# Patient Record
Sex: FEMALE | Race: WHITE | HISPANIC OR LATINO | Employment: OTHER | URBAN - METROPOLITAN AREA
[De-identification: names, ages, dates, MRNs, and addresses within clinical notes are randomized per-mention and may not be internally consistent; named-entity substitution may affect disease eponyms.]

---

## 2017-02-02 ENCOUNTER — TELEPHONE (OUTPATIENT)
Dept: FAMILY MEDICINE | Facility: CLINIC | Age: 72
End: 2017-02-02

## 2017-02-02 DIAGNOSIS — G47.00 INSOMNIA, UNSPECIFIED TYPE: ICD-10-CM

## 2017-02-02 DIAGNOSIS — I10 ESSENTIAL HYPERTENSION WITH GOAL BLOOD PRESSURE LESS THAN 140/90: Primary | ICD-10-CM

## 2017-02-02 RX ORDER — ZOLPIDEM TARTRATE 10 MG/1
5 TABLET ORAL
Qty: 10 TABLET | Refills: 0 | Status: SHIPPED | OUTPATIENT
Start: 2017-02-02 | End: 2017-03-10

## 2017-02-02 RX ORDER — METOPROLOL TARTRATE 100 MG
100 TABLET ORAL 2 TIMES DAILY
Qty: 180 TABLET | Refills: 2 | Status: SHIPPED | OUTPATIENT
Start: 2017-02-02 | End: 2017-11-13

## 2017-02-02 NOTE — TELEPHONE ENCOUNTER
Called [facility] and they do not have script on file for metoprolol, so that was resent to pharmacy.    In regards to the ambien,   Got a message today stating:  Liz Becerril at 2/2/2017 10:42 AM      Status: Signed         Expand All Collapse All    Pt would like to change 20 tablets to 40 of the Ambien    Pt can be reached @ [phone]           Last filled 11/2/16 #20 with no refills. Last seen in [facility] 11/11/16. Pt. Is requesting #40. Pended script for what she previously was dispensed.  Reviewed  and medication was last dispensed on 11/2/16.  Routing to provider pool, Dr. Mcclelland is out of the office until 2/13/17.    Genet Guzman RN  [facility] [place]

## 2017-02-02 NOTE — TELEPHONE ENCOUNTER
Metoprolol Tartr 100MG Tab      Last Written Prescription Date: 11/2/16  Last Fill Quantity: 180, # refills: 3    Last Office Visit with [facility], [facility] or [employer] prescribing provider:  11/11/16   Future Office Visit:        BP Readings from Last 3 Encounters:   11/11/16 140/78   07/08/16 138/80   06/20/16 138/80       Zolpdem Tartrate 10 Mg Tablet      Last Written Prescription Date:  11/2/16  Last Fill Quantity: 20,   # refills: 0  Last Office Visit with [facility] or [facility] [employer] [facility] prescribing provider: 11/11/16  Future Office visit:       Routing refill request to provider for review/approval because:  Drug not on the [facility], [facility] or [employer] [facility] refill protocol or controlled substance

## 2017-02-02 NOTE — TELEPHONE ENCOUNTER
Please see refill encounter dated 2/2/17. Closing encounter. No further action needed.    Genet Guzman RN  [facility]

## 2017-03-09 DIAGNOSIS — G47.00 INSOMNIA, UNSPECIFIED TYPE: ICD-10-CM

## 2017-03-09 NOTE — TELEPHONE ENCOUNTER
Controlled Substance Refill Request for zolpidem (AMBIEN) 10 MG tablet  Problem List Complete:     checked in past 6 months?    LAST REFILL:  2/2/17  #10/0  LOV:  11/11/16   Patient was only given #10 the last time she got this and Dr. Mcclelland had talked about at one time ok to give her up to #40 and she would want this if possible.

## 2017-03-10 RX ORDER — ZOLPIDEM TARTRATE 10 MG/1
5 TABLET ORAL
Qty: 20 TABLET | Refills: 1 | Status: SHIPPED | OUTPATIENT
Start: 2017-03-10 | End: 2017-08-08

## 2017-03-10 NOTE — TELEPHONE ENCOUNTER
Routing to provider pool while Dr. Mcclelland is out -- pt is requesting 40 (?) Ambien. Please review and sign/advise.  checked -- last filled 2/2 for 20-day supply.    Thank you  Silke Cesar, AMADON, RN  [facility] [place] [place]

## 2017-08-08 DIAGNOSIS — M25.562 CHRONIC PAIN OF LEFT KNEE: ICD-10-CM

## 2017-08-08 DIAGNOSIS — G89.29 CHRONIC PAIN OF LEFT KNEE: ICD-10-CM

## 2017-08-08 DIAGNOSIS — G47.00 INSOMNIA, UNSPECIFIED TYPE: ICD-10-CM

## 2017-08-08 NOTE — TELEPHONE ENCOUNTER
Zolpidem    Last Written Prescription Date: 3/10/17  Last Fill Quantity: 20,  # refills: 1   Last Office Visit with [facility] or [facility] prescribing provider: 11/11/16    Reviewed  and no concerns.  Dispensed on 3/10/17 #20, and 5/15/17 #20    Genet Guzman RN  [facility] [place]

## 2017-08-09 RX ORDER — IBUPROFEN 800 MG/1
TABLET, FILM COATED ORAL
Qty: 30 TABLET | Refills: 0 | Status: SHIPPED | OUTPATIENT
Start: 2017-08-09 | End: 2017-09-08 | Stop reason: DRUGHIGH

## 2017-08-09 RX ORDER — ZOLPIDEM TARTRATE 10 MG/1
TABLET ORAL
Qty: 20 TABLET | Refills: 1 | Status: SHIPPED | OUTPATIENT
Start: 2017-08-09 | End: 2017-11-13

## 2017-08-09 NOTE — TELEPHONE ENCOUNTER
Ibuprofen      Last Written Prescription Date: 12/14/16  Last Quantity: 30, # refills: 1  Last Office Visit with [facility], [facility] or [facility] prescribing provider: 11/11/16       Creatinine   Date Value Ref Range Status   11/11/2016 0.79 0.52 - 1.04 mg/dL Final     Lab Results   Component Value Date    AST 22 11/11/2016     Lab Results   Component Value Date    ALT 24 11/11/2016     BP Readings from Last 3 Encounters:   11/11/16 140/78   07/08/16 138/80   06/20/16 138/80     Prescription approved per [facility] Refill Protocol.    Genet Guzman RN  [facility] [place]

## 2017-09-01 ENCOUNTER — TELEPHONE (OUTPATIENT)
Dept: FAMILY MEDICINE | Facility: CLINIC | Age: 72
End: 2017-09-01

## 2017-09-01 NOTE — TELEPHONE ENCOUNTER
Pt called in and is requesting a muscle relaxer from Dr. Mcclelland as she had fallen and bruised her back a week or two ago and it hasn't gone away and she is still in pain    Pt can be reached @ [phone] yahaira

## 2017-09-05 NOTE — TELEPHONE ENCOUNTER
Patient called because she fell against a wooden chair two weeks ago and bruised her back. She reports she was taking ibuprofen but the pain has continued. She reports no difficulty moving, but that she does have pain when moving around.     Appointment scheduled for 9/8/17      Atiya Chau RN  [facility]

## 2017-09-08 ENCOUNTER — OFFICE VISIT (OUTPATIENT)
Dept: FAMILY MEDICINE | Facility: CLINIC | Age: 72
End: 2017-09-08
Payer: MEDICARE

## 2017-09-08 VITALS
BODY MASS INDEX: 43.33 KG/M2 | HEIGHT: 61 IN | HEART RATE: 54 BPM | WEIGHT: 229.5 LBS | SYSTOLIC BLOOD PRESSURE: 132 MMHG | OXYGEN SATURATION: 97 % | DIASTOLIC BLOOD PRESSURE: 72 MMHG

## 2017-09-08 DIAGNOSIS — Z23 NEED FOR PROPHYLACTIC VACCINATION AND INOCULATION AGAINST INFLUENZA: ICD-10-CM

## 2017-09-08 DIAGNOSIS — G89.29 CHRONIC MIDLINE LOW BACK PAIN, WITH SCIATICA PRESENCE UNSPECIFIED: Primary | ICD-10-CM

## 2017-09-08 DIAGNOSIS — M54.5 CHRONIC MIDLINE LOW BACK PAIN, WITH SCIATICA PRESENCE UNSPECIFIED: Primary | ICD-10-CM

## 2017-09-08 PROCEDURE — G0008 ADMIN INFLUENZA VIRUS VAC: HCPCS | Performed by: FAMILY MEDICINE

## 2017-09-08 PROCEDURE — 99213 OFFICE O/P EST LOW 20 MIN: CPT | Performed by: FAMILY MEDICINE

## 2017-09-08 PROCEDURE — 90662 IIV NO PRSV INCREASED AG IM: CPT | Performed by: FAMILY MEDICINE

## 2017-09-08 RX ORDER — IBUPROFEN 600 MG/1
600 TABLET, FILM COATED ORAL 3 TIMES DAILY PRN
Qty: 90 TABLET | Refills: 1 | Status: SHIPPED | OUTPATIENT
Start: 2017-09-08 | End: 2018-03-25

## 2017-09-08 RX ORDER — CYCLOBENZAPRINE HCL 10 MG
5-10 TABLET ORAL 3 TIMES DAILY PRN
Qty: 30 TABLET | Refills: 2 | Status: SHIPPED | OUTPATIENT
Start: 2017-09-08 | End: 2017-11-13

## 2017-09-08 NOTE — MR AVS SNAPSHOT
"              After Visit Summary   9/8/2017    Kyler Whitlock    MRN: [id number]           Patient Information     Date Of Birth          1945        Visit Information        Provider Department      9/8/2017 10:30 AM Pedro Mcclelland MD [facility] [place]        Today's Diagnoses     Chronic midline low back pain, with sciatica presence unspecified    -  1      Care Instructions    -If you do not notice any improvement over the weekend, you may reach radiology at [phone] to schedule an xray of your back.  -You may start on flexeril 3 times daily as needed to help with the pain.  -I have also given you a prescription for ibuprofen, which you may use 3 times daily as needed for pain.          Follow-ups after your visit        Future tests that were ordered for you today     Open Future Orders        Priority Expected Expires Ordered    XR Lumbar Spine 2/3 Views Routine 9/8/2017 9/8/2018 9/8/2017            Who to contact     If you have questions or need follow up information about today's clinic visit or your schedule please contact [facility] directly at [phone].  Normal or non-critical lab and imaging results will be communicated to you by [employer]Â®hart, letter or phone within 4 business days after the clinic has received the results. If you do not hear from us within 7 days, please contact the clinic through [employer] or phone. If you have a critical or abnormal lab result, we will notify you by phone as soon as possible.  Submit refill requests through [employer] or call your pharmacy and they will forward the refill request to us. Please allow 3 business days for your refill to be completed.          Additional Information About Your Visit        MyChart Information     Tealeaf lets you send messages to your doctor, view your test results, renew your prescriptions, schedule appointments and more. To sign up, go to [email or web address] . Click on \"Log in\" on the left " "side of the screen, which will take you to the Welcome page. Then click on \"Sign up Now\" on the right side of the page.     You will be asked to enter the access code listed below, as well as some personal information. Please follow the directions to create your username and password.     Your access code is: [email or web address]  Expires: 2017 11:05 AM     Your access code will  in 90 days. If you need help or a new code, please call your [facility] or [phone].        Care EveryWhere ID     This is your Care EveryWhere ID. This could be used by other organizations to access your [facility] medical records  [id number]        Your Vitals Were     Pulse Height Pulse Oximetry BMI (Body Mass Index)          54 5' 1.22\" (1.555 m) 97% 43.05 kg/m2         Blood Pressure from Last 3 Encounters:   17 132/72   16 140/78   16 138/80    Weight from Last 3 Encounters:   17 229 lb 8 oz (104.1 kg)   16 216 lb 6.4 oz (98.2 kg)   16 206 lb 8 oz (93.7 kg)                 Today's Medication Changes          These changes are accurate as of: 17 11:05 AM.  If you have any questions, ask your nurse or doctor.               Start taking these medicines.        Dose/Directions    cyclobenzaprine 10 MG tablet   Commonly known as:  FLEXERIL   Used for:  Chronic midline low back pain, with sciatica presence unspecified   Started by:  Pedro Mcclelland MD        Dose:  5-10 mg   Take 0.5-1 tablets (5-10 mg) by mouth 3 times daily as needed for muscle spasms   Quantity:  30 tablet   Refills:  2         These medicines have changed or have updated prescriptions.        Dose/Directions    ibuprofen 600 MG tablet   Commonly known as:  ADVIL/MOTRIN   This may have changed:  See the new instructions.   Used for:  Chronic midline low back pain, with sciatica presence unspecified   Changed by:  Pedro Mcclelland MD        Dose:  600 mg   Take 1 tablet (600 mg) by mouth 3 times daily as needed " for moderate pain   Quantity:  90 tablet   Refills:  1            Where to get your medicines      These medications were sent to [place] [id number] - [facility] [address]  [address] [facility] [address]     Phone:  [phone]     cyclobenzaprine 10 MG tablet    ibuprofen 600 MG tablet                Primary Care Provider Office Phone # Fax #    Pedro Mcclelland [phone]       [address] [facility] [address]  [facility] [address]        Equal Access to Services     [facility]: Hadii aad ku hadasho Soomaali, waaxda luqadaha, qaybta kaalmada adeegyada, waxay hermilo haycaleb luke . So [facility] [phone].    ATENCIÓN: Si habla español, tiene a stein disposición servicios gratuitos de asistencia lingüística. [facility] [phone].    We comply with applicable federal civil rights laws and [place] laws. We do not discriminate on the basis of race, color, national origin, age, disability sex, sexual orientation or gender identity.            Thank you!     Thank you for choosing [facility] [place]  for your care. Our goal is always to provide you with excellent care. Hearing back from our patients is one way we can continue to improve our services. Please take a few minutes to complete the written survey that you may receive in the mail after your visit with us. Thank you!             Your Updated Medication List - Protect others around you: Learn how to safely use, store and throw away your medicines at www.disposemymeds.org.          This list is accurate as of: 9/8/17 11:05 AM.  Always use your most recent med list.                   Brand Name Dispense Instructions for use Diagnosis    albuterol 108 (90 BASE) MCG/ACT Inhaler    PROAIR HFA/PROVENTIL HFA/VENTOLIN HFA    1 Inhaler    Inhale 2 puffs into the lungs every 6 hours as needed for shortness of breath / dyspnea or wheezing    Cough, persistent       C 500 PO      Take  by mouth daily.         cyclobenzaprine 10 MG tablet    FLEXERIL    30 tablet    Take 0.5-1 tablets (5-10 mg) by mouth 3 times daily as needed for muscle spasms    Chronic midline low back pain, with sciatica presence unspecified       D 1000 PO      Take  by mouth daily.        FISH OIL PO      Take  by mouth daily.        FLUZONE HIGH-DOSE injection   Generic drug:  Influenza Vac Split High-Dose/High-Antigen           * gabapentin 100 MG capsule    NEURONTIN    90 capsule    Take 1 capsule (100 mg) by mouth 3 times daily    Burning feet syndrome       * gabapentin 300 MG capsule    NEURONTIN    270 capsule    Take 1 capsule (300 mg) by mouth 3 times daily    Chronic pain of left knee       GLUCOSAMINE PO      Take  by mouth daily.        guaiFENesin-codeine 100-10 MG/5ML Soln solution    ROBITUSSIN AC    120 mL    Take 10 mLs by mouth every 4 hours as needed for cough    Cough, persistent       HYDROcodone-acetaminophen 5-325 MG per tablet    NORCO    30 tablet    Take 1 tablet by mouth daily as needed for pain        ibuprofen 600 MG tablet    ADVIL/MOTRIN    90 tablet    Take 1 tablet (600 mg) by mouth 3 times daily as needed for moderate pain    Chronic midline low back pain, with sciatica presence unspecified       LORazepam 0.5 MG tablet    ATIVAN    4 tablet    Take 0.5-1 tablets (0.25-0.5 mg) by mouth every 8 hours as needed for anxiety Take 30 minutes prior to departure.  Do not operate a vehicle after taking this medication    Acute stress reaction       losartan 50 MG tablet    COZAAR    90 tablet    Take 1 tablet (50 mg) by mouth daily    Essential hypertension with goal blood pressure less than 140/90       MAGNESIUM ASPARTATE PO      Take  by mouth.        metoprolol 100 MG tablet    LOPRESSOR    180 tablet    Take 1 tablet (100 mg) by mouth 2 times daily    Essential hypertension with goal blood pressure less than 140/90       nystatin 615409 UNIT/GM Powd    MYCOSTATIN    60 g    Apply 1 g topically 3 times daily as needed     Candidal intertrigo       predniSONE 20 MG tablet    DELTASONE    5 tablet    Take 1 tablet (20 mg) by mouth daily    Cough, persistent       ranitidine 150 MG tablet    ZANTAC    180 tablet    Take 1 tablet (150 mg) by mouth 2 times daily    Gastroesophageal reflux disease, esophagitis presence not specified       simvastatin 20 MG tablet    ZOCOR    90 tablet    Take 1 tablet (20 mg) by mouth At Bedtime    Hyperlipidemia LDL goal <130       triamcinolone 0.1 % cream    KENALOG    30 g    Apply topically daily as needed for irritation    Candidal vulvovaginitis       triamterene-hydrochlorothiazide 37.5-25 MG per tablet    MAXZIDE-25    90 tablet    Take 1 tablet by mouth daily    Essential hypertension with goal blood pressure less than 140/90       vitamin B complex with vitamin C Tabs tablet      Take 1 tablet by mouth daily        * zolpidem 5 MG tablet    AMBIEN    30 tablet    Take 1 tablet (5 mg) by mouth nightly as needed for sleep    Insomnia       * zolpidem 10 MG tablet    AMBIEN    20 tablet    TAKE 0.5 TABLETS NIGHTLY AS NEEDED FOR SLEEP    Insomnia, unspecified type       * Notice:  This list has 4 medication(s) that are the same as other medications prescribed for you. Read the directions carefully, and ask your doctor or other care provider to review them with you.

## 2017-09-08 NOTE — PROGRESS NOTES
SUBJECTIVE:   Kyler Whitlock is a 72 year old female who presents to clinic today for the following health issues:    Back Pain       Duration: 2 weeks         Specific cause: fall     Description:   Location of pain: low back left  Character of pain: sharp when moving   Pain radiation:none  New numbness or weakness in legs, not attributed to pain:  no     Intensity: mild, moderate    History:   Pain interferes with job: No,   History of back problems: yes   Any previous MRI or X-rays: None  Sees a specialist for back pain:  No  Therapies tried without relief: none    Alleviating factors:   Improved by: NSAIDs      Precipitating factors:  Worsened by: moving     Functional and Psychosocial Screen (Mc STarT Back):      Not performed today      Accompanying Signs & Symptoms:  Risk of Fracture:  Age >64 and fall  Risk of Cauda Equina:  None  Risk of Infection:  None  Risk of Cancer:  None  Risk of Ankylosing Spondylitis:  Onset at age <35, male, AND morning back stiffness. no     Patient reports that 2 weeks ago while she was stepping up into her bed she tripped and fell. While she suffered no wounds, she has since been having problems with left low back pain that is sharp when she moves. No radiation of pain down her legs. Pain is mild to moderate. She will be leaving next week for a vacation, and wanted to get checked before she left. Her back has been improving over the last 2 weeks since the fall. History of back pain, which has been helped in the past with flexeril.    Problem list and histories reviewed & adjusted, as indicated.  Additional history: as documented    Patient Active Problem List   Diagnosis     Esophageal reflux     Left knee pain     Hyperlipidemia LDL goal <130     Nonspecific abnormal electrocardiogram (ECG) (EKG)     Acute stress reaction     Family history of thyroid disease     Sciatica     Candidal vulvovaginitis     Impacted cerumen     HL (hearing loss)     Adult BMI 30+      Decreased hearing, bilateral     Gastroesophageal reflux disease, esophagitis presence not specified     Osteoarthritis of multiple joints, unspecified osteoarthritis type     Insomnia, unspecified type     Essential hypertension with goal blood pressure less than 140/90     Past Surgical History:   Procedure Laterality Date     CHOLECYSTECTOMY       HERNIORRHAPHY VENTRAL  4/12/2012    Procedure:HERNIORRHAPHY VENTRAL; ventral hernia repair with mesh; Surgeon:ELOISA INMAN; Location:[facility]     HYSTERECTOMY  5/24/01    uterine prolapse/ant. repair       Social History   Substance Use Topics     Smoking status: Never Smoker     Smokeless tobacco: Never Used     Alcohol use No     Family History   Problem Relation Age of Onset     Blood Disease Mother      Depression Mother      Psychotic Disorder Mother      Thyroid Disease Mother      HEART DISEASE Father      C.A.D. Brother      Hypertension Brother      CEREBROVASCULAR DISEASE Sister      Hypertension Sister      Thyroid Disease Sister      Hypertension Son      C.A.D. Brother      Hypertension Brother      Hypertension Son      Hypertension Son      Circulatory Son      Thyroid Disease Son          Current Outpatient Prescriptions   Medication Sig Dispense Refill     zolpidem (AMBIEN) 10 MG tablet TAKE 0.5 TABLETS NIGHTLY AS NEEDED FOR SLEEP 20 tablet 1     ibuprofen (ADVIL/MOTRIN) 800 MG tablet TAKE 1 TABLET (800 MG) BY MOUTH EVERY 8 HOURS AS NEEDED FOR MODERATE PAIN 30 tablet 0     metoprolol (LOPRESSOR) 100 MG tablet Take 1 tablet (100 mg) by mouth 2 times daily 180 tablet 2     gabapentin (NEURONTIN) 300 MG capsule Take 1 capsule (300 mg) by mouth 3 times daily 270 capsule 3     HYDROcodone-acetaminophen (NORCO) 5-325 MG per tablet Take 1 tablet by mouth daily as needed for pain 30 tablet 0     gabapentin (NEURONTIN) 100 MG capsule Take 1 capsule (100 mg) by mouth 3 times daily 90 capsule 3     triamcinolone (KENALOG) 0.1 % cream Apply topically daily as needed  for irritation 30 g 1     triamterene-hydrochlorothiazide (MAXZIDE-25) 37.5-25 MG per tablet Take 1 tablet by mouth daily 90 tablet 3     simvastatin (ZOCOR) 20 MG tablet Take 1 tablet (20 mg) by mouth At Bedtime 90 tablet 3     losartan (COZAAR) 50 MG tablet Take 1 tablet (50 mg) by mouth daily 90 tablet 3     ranitidine (ZANTAC) 150 MG tablet Take 1 tablet (150 mg) by mouth 2 times daily 180 tablet 3     vitamin  B complex with vitamin C (VITAMIN  B COMPLEX) TABS Take 1 tablet by mouth daily       albuterol (PROAIR HFA, PROVENTIL HFA, VENTOLIN HFA) 108 (90 BASE) MCG/ACT inhaler Inhale 2 puffs into the lungs every 6 hours as needed for shortness of breath / dyspnea or wheezing 1 Inhaler 11     zolpidem (AMBIEN) 5 MG tablet Take 1 tablet (5 mg) by mouth nightly as needed for sleep 30 tablet 0     FLUZONE HIGH-DOSE injection   0     predniSONE (DELTASONE) 20 MG tablet Take 1 tablet (20 mg) by mouth daily 5 tablet 0     guaiFENesin-codeine (ROBITUSSIN AC) 100-10 MG/5ML SOLN Take 10 mLs by mouth every 4 hours as needed for cough 120 mL 0     LORazepam (ATIVAN) 0.5 MG tablet Take 0.5-1 tablets (0.25-0.5 mg) by mouth every 8 hours as needed for anxiety Take 30 minutes prior to departure.  Do not operate a vehicle after taking this medication 4 tablet 0     nystatin (MYCOSTATIN) 508303 UNIT/GM POWD Apply 1 g topically 3 times daily as needed 60 g 1     MAGNESIUM ASPARTATE PO Take  by mouth.       GLUCOSAMINE PO Take  by mouth daily.       Omega-3 Fatty Acids (FISH OIL PO) Take  by mouth daily.       Ascorbic Acid (C 500 PO) Take  by mouth daily.       Cholecalciferol (D 1000 PO) Take  by mouth daily.       No Known Allergies  BP Readings from Last 3 Encounters:   09/08/17 132/72   11/11/16 140/78   07/08/16 138/80    Wt Readings from Last 3 Encounters:   09/08/17 104.1 kg (229 lb 8 oz)   11/11/16 98.2 kg (216 lb 6.4 oz)   07/08/16 93.7 kg (206 lb 8 oz)        Reviewed and updated as needed this visit by clinical staff      "  Reviewed and updated as needed this visit by Provider         ROS:  Denies headache, insomnia, chest pain, shortness of breath, cough, heartburn, bowel issues, bladder issues, neck pain, back pain, hip pain, knee pain, ankle pain, or foot pain. Remainder of ROS is negative unless otherwise noted above or in HPI.    This document serves as a record of the services and decisions personally performed and made by Pedro Mcclelland MD. It was created on his behalf by Warren Elena, a trained medical scribe. The creation of this document is based the provider's statements to the medical scribe.  Warren Elena 10:52 AM September 8, 2017    OBJECTIVE:     /72  Pulse 54  Ht 1.555 m (5' 1.22\")  Wt 104.1 kg (229 lb 8 oz)  SpO2 97%  BMI 43.05 kg/m2  Body mass index is 43.05 kg/(m^2).  GENERAL: healthy, alert and no distress  EYES: Eyes grossly normal to inspection, PERRL and conjunctivae and sclerae normal  HENT: ear canals and TM's normal, nose and mouth without ulcers or lesions  NECK: no adenopathy, no asymmetry, masses, or scars and thyroid normal to palpation  RESP: lungs clear to auscultation - no rales, rhonchi or wheezes  CV: regular rate and rhythm, normal S1 S2, no S3 or S4, no murmur, click or rub, no peripheral edema and peripheral pulses strong  ABDOMEN: soft, nontender, no hepatosplenomegaly, no masses and bowel sounds normal  MS: lower ribs nontender, SI joints nontender, spinous processes nontender  SKIN: no suspicious lesions or rashes  NEURO: Normal strength and tone, mentation intact and speech normal  PSYCH: mentation appears normal, affect normal/bright    Diagnostic Test Results:  No results found for this or any previous visit (from the past 24 hour(s)).    ASSESSMENT/PLAN:     (M54.5,  G89.29) Chronic midline low back pain, with sciatica presence unspecified  (primary encounter diagnosis)  Comment: Improving. Prescription given for ibuprofen and cyclobenzaprine as needed. Order " placed for xray of lumbar spine to be completed if no improvement over next 3 days.  Plan: cyclobenzaprine (FLEXERIL) 10 MG tablet, XR         Lumbar Spine 2/3 Views, ibuprofen         (ADVIL/MOTRIN) 600 MG tablet        Start on ibuprofen and cyclobenzaprine 3 times daily as needed for pain. Follow through with xray of lumbar spine if no improvement over next 3 days.    Patient Instructions   -If you do not notice any improvement over the weekend, you may reach radiology at [phone] to schedule an xray of your back.  -You may start on flexeril 3 times daily as needed to help with the pain.  -I have also given you a prescription for ibuprofen, which you may use 3 times daily as needed for pain.    The information in this document, created by the medical scribe for me, accurately reflects the services I personally performed and the decisions made by me. I have reviewed and approved this document for accuracy prior to leaving the patient care area.   Pedro Mcclelland MD 10:52 AM September 8, 2017  [facility] [place] [facility] [place]

## 2017-09-08 NOTE — PATIENT INSTRUCTIONS
-If you do not notice any improvement over the weekend, you may reach radiology at [phone] to schedule an xray of your back.  -You may start on flexeril 3 times daily as needed to help with the pain.  -I have also given you a prescription for ibuprofen, which you may use 3 times daily as needed for pain.

## 2017-09-08 NOTE — PROGRESS NOTES
Injectable Influenza Immunization Documentation    1.  Are you sick today? (Fever of 100.5 or higher on the day of the clinic)   No    2.  Have you ever had Guillain-Brandon Syndrome within 6 weeks of an influenza vaccionation?  No    3. Do you have a life-threatening allergy to eggs?  No    4. Do you have a life-threatening allergy to a component of the vaccine? May include antibiotics, gelatin or latex.  No     5. Have you ever had a reaction to a dose of flu vaccine that needed immediate medical attention?  No     Form completed by Karla Dailey MA

## 2017-09-08 NOTE — NURSING NOTE
"Chief Complaint   Patient presents with     Back Pain       Initial /72  Pulse 54  Ht 5' 1.22\" (1.555 m)  Wt 229 lb 8 oz (104.1 kg)  SpO2 97%  BMI 43.05 kg/m2 Estimated body mass index is 43.05 kg/(m^2) as calculated from the following:    Height as of this encounter: 5' 1.22\" (1.555 m).    Weight as of this encounter: 229 lb 8 oz (104.1 kg).  Medication Reconciliation: complete   Karla Dailey MA      "

## 2017-09-22 ENCOUNTER — RADIANT APPOINTMENT (OUTPATIENT)
Dept: GENERAL RADIOLOGY | Facility: CLINIC | Age: 72
End: 2017-09-22
Attending: PHYSICIAN ASSISTANT
Payer: MEDICARE

## 2017-09-22 ENCOUNTER — OFFICE VISIT (OUTPATIENT)
Dept: URGENT CARE | Facility: URGENT CARE | Age: 72
End: 2017-09-22
Payer: MEDICARE

## 2017-09-22 VITALS
HEIGHT: 62 IN | BODY MASS INDEX: 42.14 KG/M2 | DIASTOLIC BLOOD PRESSURE: 80 MMHG | RESPIRATION RATE: 16 BRPM | OXYGEN SATURATION: 97 % | SYSTOLIC BLOOD PRESSURE: 114 MMHG | HEART RATE: 65 BPM | TEMPERATURE: 97 F | WEIGHT: 229 LBS

## 2017-09-22 DIAGNOSIS — M79.672 LEFT FOOT PAIN: ICD-10-CM

## 2017-09-22 DIAGNOSIS — M79.672 LEFT FOOT PAIN: Primary | ICD-10-CM

## 2017-09-22 PROCEDURE — 73590 X-RAY EXAM OF LOWER LEG: CPT | Mod: LT

## 2017-09-22 PROCEDURE — 99213 OFFICE O/P EST LOW 20 MIN: CPT | Performed by: PHYSICIAN ASSISTANT

## 2017-09-22 PROCEDURE — 73630 X-RAY EXAM OF FOOT: CPT | Mod: LT

## 2017-09-22 NOTE — MR AVS SNAPSHOT
"              After Visit Summary   2017    Kyler Whitlock    MRN: [id number]           Patient Information     Date Of Birth          1945        Visit Information        Provider Department      2017 7:10 PM Sebastian Agudelo PA-C [facility]        Today's Diagnoses     Left foot pain    -  1       Follow-ups after your visit        Who to contact     If you have questions or need follow up information about today's clinic visit or your schedule please contact [facility] directly at [phone].  Normal or non-critical lab and imaging results will be communicated to you by [employer], letter or phone within 4 business days after the clinic has received the results. If you do not hear from us within 7 days, please contact the clinic through [employer] or phone. If you have a critical or abnormal lab result, we will notify you by phone as soon as possible.  Submit refill requests through [employer] or call your pharmacy and they will forward the refill request to us. Please allow 3 business days for your refill to be completed.          Additional Information About Your Visit        MyChart Information     [employer] lets you send messages to your doctor, view your test results, renew your prescriptions, schedule appointments and more. To sign up, go to [email or web address] . Click on \"Log in\" on the left side of the screen, which will take you to the Welcome page. Then click on \"Sign up Now\" on the right side of the page.     You will be asked to enter the access code listed below, as well as some personal information. Please follow the directions to create your username and password.     Your access code is: [email or web address]  Expires: 2017 11:05 AM     Your access code will  in 90 days. If you need help or a new code, please call your [place] clinic or [phone].        Care EveryWhere ID     This is your Care EveryWhere ID. This could be used by " "other organizations to access your [place] medical records  [id number]        Your Vitals Were     Pulse Temperature Respirations Height Pulse Oximetry BMI (Body Mass Index)    65 97  F (36.1  C) (Tympanic) 16 5' 1.5\" (1.562 m) 97% 42.57 kg/m2       Blood Pressure from Last 3 Encounters:   09/22/17 114/80   09/08/17 132/72   11/11/16 140/78    Weight from Last 3 Encounters:   09/22/17 229 lb (103.9 kg)   09/08/17 229 lb 8 oz (104.1 kg)   11/11/16 216 lb 6.4 oz (98.2 kg)                 Today's Medication Changes          These changes are accurate as of: 9/22/17  8:51 PM.  If you have any questions, ask your nurse or doctor.               Stop taking these medicines if you haven't already. Please contact your care team if you have questions.     guaiFENesin-codeine 100-10 MG/5ML Soln solution   Commonly known as:  ROBITUSSIN AC   Stopped by:  Sebastian Agudelo PA-C           HYDROcodone-acetaminophen 5-325 MG per tablet   Commonly known as:  NORCO   Stopped by:  Sebastian Agudelo PA-C           LORazepam 0.5 MG tablet   Commonly known as:  ATIVAN   Stopped by:  Sebastian Agudelo PA-C                    Primary Care Provider Office Phone # Fax #    Pedro Gt Mcclelland [phone]       [address] [address]        Equal Access to Services     [facility] AH: Hadii aad ku hadasho Soomaali, waaxda luqadaha, qaybta kaalmada adeegyada, waxamanda hermilo luke . So [facility] [phone].    ATENCIÓN: Si habla español, tiene a stein disposición servicios gratuitos de asistencia lingüística. Llame al [phone].    We comply with applicable federal civil rights laws and [place] laws. We do not discriminate on the basis of race, color, national origin, age, disability sex, sexual orientation or gender identity.            Thank you!     Thank you for choosing [facility]  for your care. Our goal is always to provide you with excellent care. " Hearing back from our patients is one way we can continue to improve our services. Please take a few minutes to complete the written survey that you may receive in the mail after your visit with us. Thank you!             Your Updated Medication List - Protect others around you: Learn how to safely use, store and throw away your medicines at www.disposemymeds.org.          This list is accurate as of: 9/22/17  8:51 PM.  Always use your most recent med list.                   Brand Name Dispense Instructions for use Diagnosis    albuterol 108 (90 BASE) MCG/ACT Inhaler    PROAIR HFA/PROVENTIL HFA/VENTOLIN HFA    1 Inhaler    Inhale 2 puffs into the lungs every 6 hours as needed for shortness of breath / dyspnea or wheezing    Cough, persistent       C 500 PO      Take  by mouth daily.        cyclobenzaprine 10 MG tablet    FLEXERIL    30 tablet    Take 0.5-1 tablets (5-10 mg) by mouth 3 times daily as needed for muscle spasms    Chronic midline low back pain, with sciatica presence unspecified       D 1000 PO      Take  by mouth daily.        FISH OIL PO      Take  by mouth daily.        FLUZONE HIGH-DOSE injection   Generic drug:  Influenza Vac Split High-Dose/High-Antigen           * gabapentin 100 MG capsule    NEURONTIN    90 capsule    Take 1 capsule (100 mg) by mouth 3 times daily    Burning feet syndrome       * gabapentin 300 MG capsule    NEURONTIN    270 capsule    Take 1 capsule (300 mg) by mouth 3 times daily    Chronic pain of left knee       GLUCOSAMINE PO      Take  by mouth daily.        ibuprofen 600 MG tablet    ADVIL/MOTRIN    90 tablet    Take 1 tablet (600 mg) by mouth 3 times daily as needed for moderate pain    Chronic midline low back pain, with sciatica presence unspecified       losartan 50 MG tablet    COZAAR    90 tablet    Take 1 tablet (50 mg) by mouth daily    Essential hypertension with goal blood pressure less than 140/90       MAGNESIUM ASPARTATE PO      Take  by mouth.         metoprolol 100 MG tablet    LOPRESSOR    180 tablet    Take 1 tablet (100 mg) by mouth 2 times daily    Essential hypertension with goal blood pressure less than 140/90       nystatin 797498 UNIT/GM Powd    MYCOSTATIN    60 g    Apply 1 g topically 3 times daily as needed    Candidal intertrigo       predniSONE 20 MG tablet    DELTASONE    5 tablet    Take 1 tablet (20 mg) by mouth daily    Cough, persistent       ranitidine 150 MG tablet    ZANTAC    180 tablet    Take 1 tablet (150 mg) by mouth 2 times daily    Gastroesophageal reflux disease, esophagitis presence not specified       simvastatin 20 MG tablet    ZOCOR    90 tablet    Take 1 tablet (20 mg) by mouth At Bedtime    Hyperlipidemia LDL goal <130       triamcinolone 0.1 % cream    KENALOG    30 g    Apply topically daily as needed for irritation    Candidal vulvovaginitis       triamterene-hydrochlorothiazide 37.5-25 MG per tablet    MAXZIDE-25    90 tablet    Take 1 tablet by mouth daily    Essential hypertension with goal blood pressure less than 140/90       vitamin B complex with vitamin C Tabs tablet      Take 1 tablet by mouth daily        * zolpidem 5 MG tablet    AMBIEN    30 tablet    Take 1 tablet (5 mg) by mouth nightly as needed for sleep    Insomnia       * zolpidem 10 MG tablet    AMBIEN    20 tablet    TAKE 0.5 TABLETS NIGHTLY AS NEEDED FOR SLEEP    Insomnia, unspecified type       * Notice:  This list has 4 medication(s) that are the same as other medications prescribed for you. Read the directions carefully, and ask your doctor or other care provider to review them with you.

## 2017-09-23 NOTE — PROGRESS NOTES
SUBJECTIVE:  Chief Complaint   Patient presents with     Urgent Care     Musculoskeletal Problem     Has fallen 3 times in past month, Just got back from [place] today, having left leg pain.      Kyler Whitlock is a 72 year old female presents with a chief complaint of left foot and leg pain and decreased weight bearing.  The injury occurred 3 day(s) ago.   The injury happened while traveling. How: getting in a high step into the truck she twisted her left ankle and foreleg.  The patient complained of mild pain  and has had decreased ROM.  Pain exacerbated by weight-bearing.  Relieved by rest, ice and elevation.  She treated it initially with ice and rest. This is the first time this type of injury has occurred to this patient.     Past Medical History:   Diagnosis Date     Esophageal reflux     Gastroesophageal reflux     Left knee pain      Nonspecific abnormal electrocardiogram (ECG) (EKG) 8/29/2013     Current Outpatient Prescriptions   Medication Sig Dispense Refill     cyclobenzaprine (FLEXERIL) 10 MG tablet Take 0.5-1 tablets (5-10 mg) by mouth 3 times daily as needed for muscle spasms 30 tablet 2     ibuprofen (ADVIL/MOTRIN) 600 MG tablet Take 1 tablet (600 mg) by mouth 3 times daily as needed for moderate pain 90 tablet 1     metoprolol (LOPRESSOR) 100 MG tablet Take 1 tablet (100 mg) by mouth 2 times daily 180 tablet 2     gabapentin (NEURONTIN) 100 MG capsule Take 1 capsule (100 mg) by mouth 3 times daily 90 capsule 3     triamterene-hydrochlorothiazide (MAXZIDE-25) 37.5-25 MG per tablet Take 1 tablet by mouth daily 90 tablet 3     simvastatin (ZOCOR) 20 MG tablet Take 1 tablet (20 mg) by mouth At Bedtime 90 tablet 3     losartan (COZAAR) 50 MG tablet Take 1 tablet (50 mg) by mouth daily 90 tablet 3     zolpidem (AMBIEN) 5 MG tablet Take 1 tablet (5 mg) by mouth nightly as needed for sleep 30 tablet 0     zolpidem (AMBIEN) 10 MG tablet TAKE 0.5 TABLETS NIGHTLY AS NEEDED FOR SLEEP 20 tablet 1      "gabapentin (NEURONTIN) 300 MG capsule Take 1 capsule (300 mg) by mouth 3 times daily 270 capsule 3     triamcinolone (KENALOG) 0.1 % cream Apply topically daily as needed for irritation 30 g 1     ranitidine (ZANTAC) 150 MG tablet Take 1 tablet (150 mg) by mouth 2 times daily 180 tablet 3     vitamin  B complex with vitamin C (VITAMIN  B COMPLEX) TABS Take 1 tablet by mouth daily       albuterol (PROAIR HFA, PROVENTIL HFA, VENTOLIN HFA) 108 (90 BASE) MCG/ACT inhaler Inhale 2 puffs into the lungs every 6 hours as needed for shortness of breath / dyspnea or wheezing (Patient not taking: Reported on 9/22/2017) 1 Inhaler 11     FLUZONE HIGH-DOSE injection   0     predniSONE (DELTASONE) 20 MG tablet Take 1 tablet (20 mg) by mouth daily 5 tablet 0     nystatin (MYCOSTATIN) 093450 UNIT/GM POWD Apply 1 g topically 3 times daily as needed 60 g 1     MAGNESIUM ASPARTATE PO Take  by mouth.       GLUCOSAMINE PO Take  by mouth daily.       Omega-3 Fatty Acids (FISH OIL PO) Take  by mouth daily.       Ascorbic Acid (C 500 PO) Take  by mouth daily.       Cholecalciferol (D 1000 PO) Take  by mouth daily.       Social History   Substance Use Topics     Smoking status: Never Smoker     Smokeless tobacco: Never Used     Alcohol use No       ROS:  Review of systems negative except as stated above. No knee or hip pain on the ipsilateral side.    EXAM:   /80  Pulse 65  Temp 97  F (36.1  C) (Tympanic)  Resp 16  Ht 5' 1.5\" (1.562 m)  Wt 229 lb (103.9 kg)  SpO2 97%  BMI 42.57 kg/m2  Gen: healthy,alert,no distress  Extremity: right lower extremity has edema has swelling and point tenderness over the ATFL and PTFL.   There is not compromise to the distal circulation.  Pulses are +2 and CRT is brisk  She has pain over the proximal 1/3 of the pretibial area of the foreleg  No pain over metatarsals or tarsals.  No lisfranc ligament pain or pain with abduction of forefoot and stabilization of heel.  GENERAL APPEARANCE: healthy, alert " and no distress  EXTREMITIES: peripheral pulses normal  SKIN: no suspicious lesions or rashes  NEURO: Normal strength and tone, sensory exam grossly normal, mentation intact and speech normal    X-RAY was done of left tib. Fib. and left foot. Degenerative changes seen in the foot and the medial compartment of the left knee.  This is my own personal interpretation and radiologist will over-read films tomorrow.    ASSESSMENT:   sprain/strain of left ankle  Left foot pain    PLAN:    Rest, Ice, Compress, Elevate    Protected weight bearing for 3-5 days on left lower extremity  Return to clinic in 12-14 days for re-xray and reevaluation if not improved or new symptoms present.

## 2017-09-23 NOTE — NURSING NOTE
"Chief Complaint   Patient presents with     Urgent Care     Musculoskeletal Problem     Has fallen 3 times in past month, Just got back from [place] today, having left leg pain.        Initial /80  Pulse 65  Temp 97  F (36.1  C) (Tympanic)  Resp 16  Ht 5' 1.5\" (1.562 m)  Wt 229 lb (103.9 kg)  SpO2 97%  BMI 42.57 kg/m2 Estimated body mass index is 42.57 kg/(m^2) as calculated from the following:    Height as of this encounter: 5' 1.5\" (1.562 m).    Weight as of this encounter: 229 lb (103.9 kg).  Medication Reconciliation: complete  "

## 2017-11-13 ENCOUNTER — OFFICE VISIT (OUTPATIENT)
Dept: FAMILY MEDICINE | Facility: CLINIC | Age: 72
End: 2017-11-13
Payer: MEDICARE

## 2017-11-13 VITALS
BODY MASS INDEX: 41.96 KG/M2 | DIASTOLIC BLOOD PRESSURE: 72 MMHG | HEART RATE: 80 BPM | TEMPERATURE: 97.4 F | HEIGHT: 62 IN | SYSTOLIC BLOOD PRESSURE: 122 MMHG | WEIGHT: 228 LBS

## 2017-11-13 DIAGNOSIS — I10 ESSENTIAL HYPERTENSION WITH GOAL BLOOD PRESSURE LESS THAN 140/90: ICD-10-CM

## 2017-11-13 DIAGNOSIS — M25.562 CHRONIC PAIN OF LEFT KNEE: ICD-10-CM

## 2017-11-13 DIAGNOSIS — Z00.00 MEDICARE ANNUAL WELLNESS VISIT, SUBSEQUENT: Primary | ICD-10-CM

## 2017-11-13 DIAGNOSIS — Z23 NEED FOR PNEUMOCOCCAL VACCINATION: ICD-10-CM

## 2017-11-13 DIAGNOSIS — G47.00 INSOMNIA, UNSPECIFIED TYPE: ICD-10-CM

## 2017-11-13 DIAGNOSIS — G89.29 CHRONIC PAIN OF LEFT KNEE: ICD-10-CM

## 2017-11-13 DIAGNOSIS — E78.5 HYPERLIPIDEMIA LDL GOAL <130: ICD-10-CM

## 2017-11-13 PROCEDURE — 36415 COLL VENOUS BLD VENIPUNCTURE: CPT | Performed by: FAMILY MEDICINE

## 2017-11-13 PROCEDURE — 84550 ASSAY OF BLOOD/URIC ACID: CPT | Performed by: FAMILY MEDICINE

## 2017-11-13 PROCEDURE — 99212 OFFICE O/P EST SF 10 MIN: CPT | Mod: 25 | Performed by: FAMILY MEDICINE

## 2017-11-13 PROCEDURE — G0009 ADMIN PNEUMOCOCCAL VACCINE: HCPCS | Performed by: FAMILY MEDICINE

## 2017-11-13 PROCEDURE — 90732 PPSV23 VACC 2 YRS+ SUBQ/IM: CPT | Performed by: FAMILY MEDICINE

## 2017-11-13 PROCEDURE — 99397 PER PM REEVAL EST PAT 65+ YR: CPT | Mod: 25 | Performed by: FAMILY MEDICINE

## 2017-11-13 PROCEDURE — 80061 LIPID PANEL: CPT | Performed by: FAMILY MEDICINE

## 2017-11-13 PROCEDURE — 80053 COMPREHEN METABOLIC PANEL: CPT | Performed by: FAMILY MEDICINE

## 2017-11-13 RX ORDER — PHENTERMINE HYDROCHLORIDE 15 MG/1
15 CAPSULE ORAL EVERY MORNING
Qty: 30 CAPSULE | Refills: 2 | Status: SHIPPED | OUTPATIENT
Start: 2017-11-13 | End: 2019-08-10

## 2017-11-13 RX ORDER — ZOLPIDEM TARTRATE 10 MG/1
TABLET ORAL
Qty: 20 TABLET | Refills: 5 | Status: SHIPPED | OUTPATIENT
Start: 2017-11-13 | End: 2018-05-18

## 2017-11-13 RX ORDER — SIMVASTATIN 20 MG
20 TABLET ORAL AT BEDTIME
Qty: 90 TABLET | Refills: 3 | Status: SHIPPED | OUTPATIENT
Start: 2017-11-13 | End: 2018-12-05

## 2017-11-13 RX ORDER — TRIAMTERENE/HYDROCHLOROTHIAZID 37.5-25 MG
1 TABLET ORAL DAILY
Qty: 90 TABLET | Refills: 3 | Status: SHIPPED | OUTPATIENT
Start: 2017-11-13 | End: 2018-09-17

## 2017-11-13 RX ORDER — LOSARTAN POTASSIUM 50 MG/1
50 TABLET ORAL DAILY
Qty: 90 TABLET | Refills: 3 | Status: SHIPPED | OUTPATIENT
Start: 2017-11-13 | End: 2018-12-13

## 2017-11-13 RX ORDER — METOPROLOL TARTRATE 100 MG
100 TABLET ORAL 2 TIMES DAILY
Qty: 180 TABLET | Refills: 3 | Status: SHIPPED | OUTPATIENT
Start: 2017-11-13 | End: 2018-09-24 | Stop reason: DRUGHIGH

## 2017-11-13 RX ORDER — GABAPENTIN 300 MG/1
300 CAPSULE ORAL 3 TIMES DAILY
Qty: 270 CAPSULE | Refills: 3 | Status: SHIPPED | OUTPATIENT
Start: 2017-11-13 | End: 2018-04-09

## 2017-11-13 NOTE — MR AVS SNAPSHOT
"              After Visit Summary   11/13/2017    Kyler Whitlock    MRN: [id number]           Patient Information     Date Of Birth          1945        Visit Information        Provider Department      11/13/2017 10:30 AM Pedro Mcclelland MD [facility]        Today's Diagnoses     Medicare annual wellness visit, subsequent    -  1    Essential hypertension with goal blood pressure less than 140/90        Gastroesophageal reflux disease, esophagitis presence not specified        BMI 40.0-44.9, adult (H)        Hyperlipidemia LDL goal <130        Need for pneumococcal vaccination        Chronic pain of left knee        Insomnia, unspecified type          Care Instructions    -I will let you know when I get the results back from lab.  -Try using the phentermine 1 capsule daily to help with weight loss, and return in 3 months for a recheck.          Follow-ups after your visit        Who to contact     If you have questions or need follow up information about today's clinic visit or your schedule please contact [facility] directly at [phone].  Normal or non-critical lab and imaging results will be communicated to you by [employer], letter or phone within 4 business days after the clinic has received the results. If you do not hear from us within 7 days, please contact the clinic through [employer] or phone. If you have a critical or abnormal lab result, we will notify you by phone as soon as possible.  Submit refill requests through [employer] or call your pharmacy and they will forward the refill request to us. Please allow 3 business days for your refill to be completed.          Additional Information About Your Visit        [employer] Information     [employer] lets you send messages to your doctor, view your test results, renew your prescriptions, schedule appointments and more. To sign up, go to [email or web address] . Click on \"Log in\" on the left side of the screen, which " "will take you to the Welcome page. Then click on \"Sign up Now\" on the right side of the page.     You will be asked to enter the access code listed below, as well as some personal information. Please follow the directions to create your username and password.     Your access code is: [email or web address]  Expires: 2017 10:05 AM     Your access code will  in 90 days. If you need help or a new code, please call your [place] clinic or [phone].        Care EveryWhere ID     This is your Care EveryWhere ID. This could be used by other organizations to access your [place] medical records  [id number]        Your Vitals Were     Pulse Temperature Height BMI (Body Mass Index)          80 97.4  F (36.3  C) (Oral) 5' 1.5\" (1.562 m) 42.38 kg/m2         Blood Pressure from Last 3 Encounters:   17 122/72   17 114/80   17 132/72    Weight from Last 3 Encounters:   17 228 lb (103.4 kg)   17 229 lb (103.9 kg)   17 229 lb 8 oz (104.1 kg)              We Performed the Following     ADMIN MEDICARE: Pneumococcal Vaccine ()     Comprehensive metabolic panel     Lipid panel reflex to direct LDL Fasting     Pneumococcal vaccine 23 valent PPSV23  (Pneumovax) [95493]     Pneumococcal vaccine 23 valent PPSV23  (Pneumovax) [53217]     Uric acid          Today's Medication Changes          These changes are accurate as of: 17 11:29 AM.  If you have any questions, ask your nurse or doctor.               Start taking these medicines.        Dose/Directions    phentermine 15 MG capsule   Used for:  BMI 40.0-44.9, adult (H)   Started by:  Pedro Mcclelland MD        Dose:  15 mg   Take 1 capsule (15 mg) by mouth every morning   Quantity:  30 capsule   Refills:  2         These medicines have changed or have updated prescriptions.        Dose/Directions    gabapentin 300 MG capsule   Commonly known as:  NEURONTIN   This may have changed:  Another medication with the same name was removed. " Continue taking this medication, and follow the directions you see here.   Used for:  Chronic pain of left knee   Changed by:  Pedro Mcclelland MD        Dose:  300 mg   Take 1 capsule (300 mg) by mouth 3 times daily   Quantity:  270 capsule   Refills:  3       * zolpidem 5 MG tablet   Commonly known as:  AMBIEN   This may have changed:  Another medication with the same name was changed. Make sure you understand how and when to take each.   Used for:  Insomnia   Changed by:  Pedro Mcclelland MD        Dose:  5 mg   Take 1 tablet (5 mg) by mouth nightly as needed for sleep   Quantity:  30 tablet   Refills:  0       * zolpidem 10 MG tablet   Commonly known as:  AMBIEN   This may have changed:  See the new instructions.   Used for:  Insomnia, unspecified type   Changed by:  Pedro Mcclelland MD        TAKE 0.5 TABLETS NIGHTLY AS NEEDED FOR SLEEP   Quantity:  20 tablet   Refills:  5       * Notice:  This list has 2 medication(s) that are the same as other medications prescribed for you. Read the directions carefully, and ask your doctor or other care provider to review them with you.         Where to get your medicines      These medications were sent to [address] [facility]  [address] [facility] [address]     Phone:  [phone]     gabapentin 300 MG capsule    losartan 50 MG tablet    metoprolol 100 MG tablet    simvastatin 20 MG tablet    triamterene-hydrochlorothiazide 37.5-25 MG per tablet         Some of these will need a paper prescription and others can be bought over the counter.  Ask your nurse if you have questions.     Bring a paper prescription for each of these medications     phentermine 15 MG capsule    zolpidem 10 MG tablet                Primary Care Provider Office Phone # Fax #    Pedro Mcclelland [phone]       [address] [facility] [address]  [facility] [address]        Equal Access to Services     [facility] AH: Mj  gilberto Cain, wamarleeda luqadaha, qaybta kaalkory pandya, fernando pratibhain hayaadori camilorustam joelhiren laSorencaleb yovani. So [place] -[phone].    ATENCIÓN: Si habla umm, tiene a stein disposición servicios gratuitos de asistencia lingüística. Shawame al [phone].    We comply with applicable federal civil rights laws and [place] laws. We do not discriminate on the basis of race, color, national origin, age, disability, sex, sexual orientation, or gender identity.            Thank you!     Thank you for choosing [facility]  for your care. Our goal is always to provide you with excellent care. Hearing back from our patients is one way we can continue to improve our services. Please take a few minutes to complete the written survey that you may receive in the mail after your visit with us. Thank you!             Your Updated Medication List - Protect others around you: Learn how to safely use, store and throw away your medicines at www.disposemymeds.org.          This list is accurate as of: 11/13/17 11:29 AM.  Always use your most recent med list.                   Brand Name Dispense Instructions for use Diagnosis    albuterol 108 (90 BASE) MCG/ACT Inhaler    PROAIR HFA/PROVENTIL HFA/VENTOLIN HFA    1 Inhaler    Inhale 2 puffs into the lungs every 6 hours as needed for shortness of breath / dyspnea or wheezing    Cough, persistent       C 500 PO      Take  by mouth daily.        D 1000 PO      Take  by mouth daily.        FISH OIL PO      Take  by mouth daily.        FLUZONE HIGH-DOSE injection   Generic drug:  Influenza Vac Split High-Dose/High-Antigen           gabapentin 300 MG capsule    NEURONTIN    270 capsule    Take 1 capsule (300 mg) by mouth 3 times daily    Chronic pain of left knee       GLUCOSAMINE PO      Take  by mouth daily.        ibuprofen 600 MG tablet    ADVIL/MOTRIN    90 tablet    Take 1 tablet (600 mg) by mouth 3 times daily as needed for moderate pain    Chronic midline low back  pain, with sciatica presence unspecified       losartan 50 MG tablet    COZAAR    90 tablet    Take 1 tablet (50 mg) by mouth daily    Essential hypertension with goal blood pressure less than 140/90       MAGNESIUM ASPARTATE PO      Take  by mouth.        metoprolol 100 MG tablet    LOPRESSOR    180 tablet    Take 1 tablet (100 mg) by mouth 2 times daily    Essential hypertension with goal blood pressure less than 140/90       nystatin 593814 UNIT/GM Powd    MYCOSTATIN    60 g    Apply 1 g topically 3 times daily as needed    Candidal intertrigo       phentermine 15 MG capsule     30 capsule    Take 1 capsule (15 mg) by mouth every morning    BMI 40.0-44.9, adult (H)       simvastatin 20 MG tablet    ZOCOR    90 tablet    Take 1 tablet (20 mg) by mouth At Bedtime    Hyperlipidemia LDL goal <130       triamcinolone 0.1 % cream    KENALOG    30 g    Apply topically daily as needed for irritation    Candidal vulvovaginitis       triamterene-hydrochlorothiazide 37.5-25 MG per tablet    MAXZIDE-25    90 tablet    Take 1 tablet by mouth daily    Essential hypertension with goal blood pressure less than 140/90       vitamin B complex with vitamin C Tabs tablet      Take 1 tablet by mouth daily        * zolpidem 5 MG tablet    AMBIEN    30 tablet    Take 1 tablet (5 mg) by mouth nightly as needed for sleep    Insomnia       * zolpidem 10 MG tablet    AMBIEN    20 tablet    TAKE 0.5 TABLETS NIGHTLY AS NEEDED FOR SLEEP    Insomnia, unspecified type       * Notice:  This list has 2 medication(s) that are the same as other medications prescribed for you. Read the directions carefully, and ask your doctor or other care provider to review them with you.

## 2017-11-13 NOTE — NURSING NOTE
"Chief Complaint   Patient presents with     Physical       Initial /72  Pulse 80  Temp 97.4  F (36.3  C) (Oral)  Ht 5' 1.5\" (1.562 m)  Wt 228 lb (103.4 kg)  BMI 42.38 kg/m2 Estimated body mass index is 42.38 kg/(m^2) as calculated from the following:    Height as of this encounter: 5' 1.5\" (1.562 m).    Weight as of this encounter: 228 lb (103.4 kg).  BP completed using cuff size: regular    No obstetric history on file.    The following HM Due: NONE      The following patient reported/Care Every where data was sent to:  P ABSTRACT QUALITY INITIATIVES [[id number]]  SAY Keith          "

## 2017-11-13 NOTE — PROGRESS NOTES
"  SUBJECTIVE:   Kyler Whitlock is a 72 year old female who presents to clinic today for the following health issues:    Patient has been having problems with 3 separate falls over the last several months. She is not worried about the falls, and says that they were \"normal falls\" where she has fallen trying to step up into a bed, truck or on stairs at [place]. She has not suffered any injuries from her falls, but her children have asked that she carry a phone with her at all times so that she may call someone if she were to fall and be unable to get up. Patient has not noticed any cognitive changes, and she continues to drive without problems. She has a history of back pain and left knee pain, and says that she has been thinking about having surgery on both her back or knee, though she considers her back to be a bigger concern. Her son has been encouraging her to consider surgery on her left knee. History of cortisone injections in her back to no relief. Accompanying burning pain in her feet which makes walking difficult for her, but no numbness or foot drop. Patient says that she has not been active at home, though she has been able to get around well by using a walker. She is not active due to her back pain, and she reports that she has gained 20 pounds over the last year. She has a stationary bike at home that she uses occasionally for exercise, but she has not been using her bike frequently in the last year. Patient reports that she would like to lose 75 pounds, but thinks losing that weight \"is an impossibility\".    Problem list and histories reviewed & adjusted, as indicated.  Additional history: as documented    Patient Active Problem List   Diagnosis     Esophageal reflux     Left knee pain     Hyperlipidemia LDL goal <130     Nonspecific abnormal electrocardiogram (ECG) (EKG)     Acute stress reaction     Family history of thyroid disease     Sciatica     Candidal vulvovaginitis     HL " (hearing loss)     Decreased hearing, bilateral     Gastroesophageal reflux disease, esophagitis presence not specified     Osteoarthritis of multiple joints, unspecified osteoarthritis type     Insomnia, unspecified type     Essential hypertension with goal blood pressure less than 140/90     BMI 40.0-44.9, adult (H)     Past Surgical History:   Procedure Laterality Date     CHOLECYSTECTOMY       HERNIORRHAPHY VENTRAL  4/12/2012    Procedure:HERNIORRHAPHY VENTRAL; ventral hernia repair with mesh; Surgeon:ELOISA INMAN; Location: [facility]     HYSTERECTOMY  5/24/01    uterine prolapse/ant. repair       Social History   Substance Use Topics     Smoking status: Never Smoker     Smokeless tobacco: Never Used     Alcohol use No     Family History   Problem Relation Age of Onset     Blood Disease Mother      Depression Mother      Psychotic Disorder Mother      Thyroid Disease Mother      HEART DISEASE Father      C.A.D. Brother      Hypertension Brother      CEREBROVASCULAR DISEASE Sister      Hypertension Sister      Thyroid Disease Sister      Hypertension Son      C.A.D. Brother      Hypertension Brother      Hypertension Son      Hypertension Son      Circulatory Son      Thyroid Disease Son          Current Outpatient Prescriptions   Medication Sig Dispense Refill     cyclobenzaprine (FLEXERIL) 10 MG tablet Take 0.5-1 tablets (5-10 mg) by mouth 3 times daily as needed for muscle spasms 30 tablet 2     ibuprofen (ADVIL/MOTRIN) 600 MG tablet Take 1 tablet (600 mg) by mouth 3 times daily as needed for moderate pain 90 tablet 1     zolpidem (AMBIEN) 10 MG tablet TAKE 0.5 TABLETS NIGHTLY AS NEEDED FOR SLEEP 20 tablet 1     metoprolol (LOPRESSOR) 100 MG tablet Take 1 tablet (100 mg) by mouth 2 times daily 180 tablet 2     gabapentin (NEURONTIN) 300 MG capsule Take 1 capsule (300 mg) by mouth 3 times daily 270 capsule 3     gabapentin (NEURONTIN) 100 MG capsule Take 1 capsule (100 mg) by mouth 3 times daily 90 capsule 3      triamcinolone (KENALOG) 0.1 % cream Apply topically daily as needed for irritation 30 g 1     triamterene-hydrochlorothiazide (MAXZIDE-25) 37.5-25 MG per tablet Take 1 tablet by mouth daily 90 tablet 3     simvastatin (ZOCOR) 20 MG tablet Take 1 tablet (20 mg) by mouth At Bedtime 90 tablet 3     losartan (COZAAR) 50 MG tablet Take 1 tablet (50 mg) by mouth daily 90 tablet 3     ranitidine (ZANTAC) 150 MG tablet Take 1 tablet (150 mg) by mouth 2 times daily 180 tablet 3     vitamin  B complex with vitamin C (VITAMIN  B COMPLEX) TABS Take 1 tablet by mouth daily       zolpidem (AMBIEN) 5 MG tablet Take 1 tablet (5 mg) by mouth nightly as needed for sleep 30 tablet 0     FLUZONE HIGH-DOSE injection   0     predniSONE (DELTASONE) 20 MG tablet Take 1 tablet (20 mg) by mouth daily 5 tablet 0     nystatin (MYCOSTATIN) 378768 UNIT/GM POWD Apply 1 g topically 3 times daily as needed 60 g 1     MAGNESIUM ASPARTATE PO Take  by mouth.       GLUCOSAMINE PO Take  by mouth daily.       Omega-3 Fatty Acids (FISH OIL PO) Take  by mouth daily.       Ascorbic Acid (C 500 PO) Take  by mouth daily.       Cholecalciferol (D 1000 PO) Take  by mouth daily.       albuterol (PROAIR HFA, PROVENTIL HFA, VENTOLIN HFA) 108 (90 BASE) MCG/ACT inhaler Inhale 2 puffs into the lungs every 6 hours as needed for shortness of breath / dyspnea or wheezing (Patient not taking: Reported on 11/13/2017) 1 Inhaler 11     No Known Allergies  BP Readings from Last 3 Encounters:   11/13/17 122/72   09/22/17 114/80   09/08/17 132/72    Wt Readings from Last 3 Encounters:   11/13/17 103.4 kg (228 lb)   09/22/17 103.9 kg (229 lb)   09/08/17 104.1 kg (229 lb 8 oz)        [facility] and [facility] needed [facility]  M[facility]       Reviewed and updated as needed this visit by Provider         ROS:  Positive for back and knee pain.    Denies headache, insomnia, chest pain, shortness of breath, cough, heartburn, bowel issues, bladder issues, neck  "pain, hip pain, ankle pain, or foot pain. Remainder of ROS is negative unless otherwise noted above or in HPI.    This document serves as a record of the services and decisions personally performed and made by Pedro Mcclelland MD. It was created on his behalf by Warren Elena, a trained medical scribe. The creation of this document is based the provider's statements to the medical scribe.  Warren Elena 10:53 AM November 13, 2017    OBJECTIVE:     /72  Pulse 80  Temp 97.4  F (36.3  C) (Oral)  Ht 1.562 m (5' 1.5\")  Wt 103.4 kg (228 lb)  BMI 42.38 kg/m2  Body mass index is 42.38 kg/(m^2).  GENERAL: healthy, alert and no distress  EYES: Eyes grossly normal to inspection, PERRL and conjunctivae and sclerae normal. EOMI.  HENT: ear canals and TM's normal, nose and mouth without ulcers or lesions  NECK: no adenopathy, no asymmetry, masses, or scars and thyroid normal to palpation. TMJ normal. No bruits.  RESP: lungs clear to auscultation - no rales, rhonchi or wheezes  CV: regular rate and rhythm, normal S1 S2, no S3 or S4, no murmur, click or rub and peripheral pulses strong  ABDOMEN: soft, nontender, no hepatosplenomegaly, no masses and bowel sounds normal  MS: no gross musculoskeletal defects noted, lymphedema with 1+ pitting of both legs. Calves nontender.  SKIN: no suspicious lesions or rashes  NEURO: Normal strength and tone, mentation intact and speech normal. Knee reflexes hypoactive but symmetric. Fine tremors without cogwheeling.  PSYCH: mentation appears normal, affect normal/bright  LYMPH: no cervical, supraclavicular, axillary, or inguinal adenopathy    Diagnostic Test Results:  No results found for this or any previous visit (from the past 24 hour(s)).    ASSESSMENT/PLAN:     (Z00.00) Medicare annual wellness visit, subsequent  (primary encounter diagnosis)  Comment: Routine physical and labs completed today. Vaccine administered today.  Plan: ADMIN MEDICARE: Pneumococcal Vaccine " (),         CANCELED: Pneumococcal vaccine 23 valent PPSV23        (Pneumovax) [79053]        Follow up with results from lab.    (I10) Essential hypertension with goal blood pressure less than 140/90  Comment: At goal. Controlled on triamterene-hydrochlorothiazide, losartan and metoprolol. Labs completed today.  Plan: triamterene-hydrochlorothiazide (MAXZIDE-25)         37.5-25 MG per tablet, losartan (COZAAR) 50 MG         tablet, Comprehensive metabolic panel, Lipid         panel reflex to direct LDL Fasting, Uric acid,         metoprolol (LOPRESSOR) 100 MG tablet        Continue on current medications. Follow up with results from lab.    (Z68.41) BMI 40.0-44.9, adult (H)  Comment: Unchanged since last visit. Prescription given for phentermine. Encouraged regular exercise and eating a healthy diet.  Plan: phentermine 15 MG capsule        Start on phentermine once daily. Follow up in 3 months for recheck.    (E78.5) Hyperlipidemia LDL goal <130  Comment: Controlled on simvastatin. Labs completed today.  Plan: simvastatin (ZOCOR) 20 MG tablet        Continue on current medication. Follow up as needed.    (Z23) Need for pneumococcal vaccination  Comment: Vaccine administered today.  Plan: Pneumococcal vaccine 23 valent PPSV23          (Pneumovax) [84642]          (M25.562,  G89.29) Chronic pain of left knee  Comment: Worsening. Controlled on gabapentin.  Plan: gabapentin (NEURONTIN) 300 MG capsule        Continue on current medication. Follow up as needed.    (G47.00) Insomnia, unspecified type  Comment: Stable and unchanged since last visit. Controlled on zolpidem as needed.  Plan: zolpidem (AMBIEN) 10 MG tablet        Continue on current medication as needed. Follow up as needed.    Patient Instructions   -I will let you know when I get the results back from lab.  -Try using the phentermine 1 capsule daily to help with weight loss, and return in 3 months for a recheck.    The information in this document,  created by the medical scribe for me, accurately reflects the services I personally performed and the decisions made by me. I have reviewed and approved this document for accuracy prior to leaving the patient care area.   Pedro Mcclelland MD 10:53 AM November 13, 2017  [facility]

## 2017-11-13 NOTE — PATIENT INSTRUCTIONS
-I will let you know when I get the results back from lab.  -Try using the phentermine 1 capsule daily to help with weight loss, and return in 3 months for a recheck.

## 2017-11-14 DIAGNOSIS — M1A.2790 DRUG-INDUCED CHRONIC GOUT OF FOOT WITHOUT TOPHUS, UNSPECIFIED LATERALITY: Primary | ICD-10-CM

## 2017-11-14 LAB
ALBUMIN SERPL-MCNC: 3.5 G/DL (ref 3.4–5)
ALP SERPL-CCNC: 101 U/L (ref 40–150)
ALT SERPL W P-5'-P-CCNC: 23 U/L (ref 0–50)
ANION GAP SERPL CALCULATED.3IONS-SCNC: 10 MMOL/L (ref 3–14)
AST SERPL W P-5'-P-CCNC: 26 U/L (ref 0–45)
BILIRUB SERPL-MCNC: 0.5 MG/DL (ref 0.2–1.3)
BUN SERPL-MCNC: 27 MG/DL (ref 7–30)
CALCIUM SERPL-MCNC: 9.7 MG/DL (ref 8.5–10.1)
CHLORIDE SERPL-SCNC: 105 MMOL/L (ref 94–109)
CHOLEST SERPL-MCNC: 178 MG/DL
CO2 SERPL-SCNC: 24 MMOL/L (ref 20–32)
CREAT SERPL-MCNC: 0.86 MG/DL (ref 0.52–1.04)
GFR SERPL CREATININE-BSD FRML MDRD: 64 ML/MIN/1.7M2
GLUCOSE SERPL-MCNC: 92 MG/DL (ref 70–99)
HDLC SERPL-MCNC: 59 MG/DL
LDLC SERPL CALC-MCNC: 93 MG/DL
NONHDLC SERPL-MCNC: 119 MG/DL
POTASSIUM SERPL-SCNC: 3.8 MMOL/L (ref 3.4–5.3)
PROT SERPL-MCNC: 7.6 G/DL (ref 6.8–8.8)
SODIUM SERPL-SCNC: 139 MMOL/L (ref 133–144)
TRIGL SERPL-MCNC: 128 MG/DL
URATE SERPL-MCNC: 6.7 MG/DL (ref 2.6–6)

## 2017-11-14 RX ORDER — ALLOPURINOL 100 MG/1
100 TABLET ORAL DAILY
Qty: 90 TABLET | Refills: 3 | Status: SHIPPED | OUTPATIENT
Start: 2017-11-14 | End: 2018-09-29

## 2017-11-16 ENCOUNTER — TELEPHONE (OUTPATIENT)
Dept: FAMILY MEDICINE | Facility: CLINIC | Age: 72
End: 2017-11-16

## 2017-11-16 NOTE — TELEPHONE ENCOUNTER
Pt is calling because she was prescribed medication for gout, but she did not know she had gout and has questions about both her results and the medication.    Pt can be reached @ [phone] yahaira

## 2017-11-17 NOTE — TELEPHONE ENCOUNTER
Dr. Mcclelland,   Will you please advise on patient labs regarding Uric acid levels from 11/13. Patient was unaware of a diagnosis of gout.     Please advise.

## 2017-12-18 ENCOUNTER — TELEPHONE (OUTPATIENT)
Dept: FAMILY MEDICINE | Facility: CLINIC | Age: 72
End: 2017-12-18

## 2017-12-18 DIAGNOSIS — M54.50 CHRONIC MIDLINE LOW BACK PAIN WITHOUT SCIATICA: Primary | ICD-10-CM

## 2017-12-18 DIAGNOSIS — G89.29 CHRONIC MIDLINE LOW BACK PAIN WITHOUT SCIATICA: Primary | ICD-10-CM

## 2017-12-18 NOTE — TELEPHONE ENCOUNTER
"Dr. Mcclelland -- pt called at son's urging to \"do something about the back pain\" she lives with daily. She said it's \"real bad\" and affects her quality of life; she barely does anything anymore and her son wants her to get this addressed.    She had a steroid shot long ago, didn't help much. Pt wonders if it was \"in the wrong place\"    Encouraged pt to consider PT. She is open to this if you think it is appropriate.    Lastly we discussed medication. She said Vicodin and 600-800 mg ibuprofen have been helpful but you didn't want her to \"take too much.\" Discussed the side effects of these medications long term. Is there something else she could try?    Please review and advise. I offered her an appointment but she said she was just here -- 11/12/17    Thank you  AMADO ChappellN, RN  [facility]     "

## 2017-12-18 NOTE — TELEPHONE ENCOUNTER
Pedro Mcclelland MD   Rd Triage Pod A 39 minutes ago (1:01 PM)                Order signed, please notify, thanks Pedro  (Routing comment)

## 2017-12-18 NOTE — TELEPHONE ENCOUNTER
Reason for Call:  Other call back    Detailed comments: Germania had an MRI back in 2010 and had some questions about the report. I did spell the diagnosis to her, but she had some questions.    Phone Number Patient can be reached at: Home number on file [phone] (home)    Best Time: anytime    Can we leave a detailed message on this number? Not Applicable    Call taken on 12/18/2017 at 10:05 AM by Nikkie Avalos

## 2017-12-18 NOTE — TELEPHONE ENCOUNTER
No evidence that it works; nothing harmful except ~ $0.50 a dose, twice daily. Please notify, thanks Pedro

## 2017-12-18 NOTE — TELEPHONE ENCOUNTER
Dr Mcclelland -- I gave patient PT info. She is now asking if you would recommend glucosamine supplement?    Thanks  Silke Cesar, AMADON, RN  [facility]

## 2018-02-04 DIAGNOSIS — I10 ESSENTIAL HYPERTENSION WITH GOAL BLOOD PRESSURE LESS THAN 140/90: ICD-10-CM

## 2018-02-05 RX ORDER — TRIAMTERENE/HYDROCHLOROTHIAZID 37.5-25 MG
TABLET ORAL
Qty: 90 TABLET | Refills: 2 | Status: SHIPPED | OUTPATIENT
Start: 2018-02-05 | End: 2018-12-12

## 2018-02-05 NOTE — TELEPHONE ENCOUNTER
BP Readings from Last 3 Encounters:   11/13/17 122/72   09/22/17 114/80   09/08/17 132/72     Creatinine   Date Value Ref Range Status   11/13/2017 0.86 0.52 - 1.04 mg/dL Final   ]  Potassium   Date Value Ref Range Status   11/13/2017 3.8 3.4 - 5.3 mmol/L Final   ]  Refilled per [facility] refill policy.    Latisha Walsh RN

## 2018-03-25 DIAGNOSIS — M54.5 CHRONIC MIDLINE LOW BACK PAIN, WITH SCIATICA PRESENCE UNSPECIFIED: ICD-10-CM

## 2018-03-25 DIAGNOSIS — Z51.81 ENCOUNTER FOR THERAPEUTIC DRUG MONITORING: Primary | ICD-10-CM

## 2018-03-25 DIAGNOSIS — G89.29 CHRONIC MIDLINE LOW BACK PAIN, WITH SCIATICA PRESENCE UNSPECIFIED: ICD-10-CM

## 2018-03-26 NOTE — TELEPHONE ENCOUNTER
"Requested Prescriptions   Pending Prescriptions Disp Refills     ibuprofen (ADVIL/MOTRIN) 600 MG tablet [Pharmacy Med Name: IBUPROFEN 600 MG TABLET] 90 tablet 1    Last Written Prescription Date:  9/8/17  Last Fill Quantity: 90,  # refills: 1   Last office visit: 11/13/2017 with prescribing provider:  11/13/17   Future Office Visit:     Sig: TAKE 1 TABLET (600 MG) BY MOUTH 3 TIMES DAILY AS NEEDED FOR MODERATE PAIN    NSAID Medications Failed    3/25/2018  5:06 PM       Failed - Patient is age 6-64 years       Failed - Normal CBC on file in past 12 months    Recent Labs   Lab Test  11/11/16   1109   WBC  7.2   RBC  4.55   HGB  13.3   HCT  41.0   PLT  279            Passed - Blood pressure under 140/90 in past 12 months    BP Readings from Last 3 Encounters:   11/13/17 122/72   09/22/17 114/80   09/08/17 132/72                Passed - Normal ALT on file in past 12 months    Recent Labs   Lab Test  11/13/17   1135   ALT  23            Passed - Normal AST on file in past 12 months    Recent Labs   Lab Test  11/13/17   1135   AST  26            Passed - Recent (12 mo) or future (30 days) visit within the authorizing provider's specialty    Patient had office visit in the last 12 months or has a visit in the next 30 days with authorizing provider or within the authorizing provider's specialty.  See \"Patient Info\" tab in inbasket, or \"Choose Columns\" in Meds & Orders section of the refill encounter.           Passed - No active pregnancy on record       Passed - Normal serum creatinine on file in past 12 months    Recent Labs   Lab Test  11/13/17   1135   CR  0.86            Passed - No positive pregnancy test in past 12 months          "

## 2018-03-27 NOTE — TELEPHONE ENCOUNTER
Dr. Mcclelland,     Patient is requesting a refill of ibuprofen (Advil/Motrin) 600 mg tablets.     Routing to provider because patient is >64 and last CBC was on 11/11/16.    Please review/sign or advise.    RHIANNON-- spoke with patient who stated that they did not start/is not taking phentermine which was prescribed on 11/13/17, and she stopped taking her simvastatin (Zocor) a couple of weeks ago and noticed less joint pain after stopping.    Jessica Jones RN  [facility]

## 2018-03-27 NOTE — TELEPHONE ENCOUNTER
Left VM to call clinic.    Per LOV note 11/13/17-- pt started on new medication (phentermine 1 capsule daily for BMI) and to return to clinic in 3 months for follow-up.    Jessica Jones RN  [facility]

## 2018-03-28 RX ORDER — IBUPROFEN 600 MG/1
600 TABLET, FILM COATED ORAL 2 TIMES DAILY PRN
Qty: 90 TABLET | Refills: 1 | Status: SHIPPED | OUTPATIENT
Start: 2018-03-28 | End: 2019-02-24

## 2018-03-28 NOTE — TELEPHONE ENCOUNTER
Spoke with pt she will get the lab drawn    Kalpana Neal RN   [facility]

## 2018-03-29 DIAGNOSIS — Z51.81 ENCOUNTER FOR THERAPEUTIC DRUG MONITORING: ICD-10-CM

## 2018-03-29 LAB
BASOPHILS # BLD AUTO: 0 10E9/L (ref 0–0.2)
BASOPHILS NFR BLD AUTO: 0.5 %
DIFFERENTIAL METHOD BLD: NORMAL
EOSINOPHIL # BLD AUTO: 0.1 10E9/L (ref 0–0.7)
EOSINOPHIL NFR BLD AUTO: 2 %
ERYTHROCYTE [DISTWIDTH] IN BLOOD BY AUTOMATED COUNT: 13.5 % (ref 10–15)
HCT VFR BLD AUTO: 40.8 % (ref 35–47)
HGB BLD-MCNC: 13.2 G/DL (ref 11.7–15.7)
LYMPHOCYTES # BLD AUTO: 2.5 10E9/L (ref 0.8–5.3)
LYMPHOCYTES NFR BLD AUTO: 38.4 %
MCH RBC QN AUTO: 29.4 PG (ref 26.5–33)
MCHC RBC AUTO-ENTMCNC: 32.4 G/DL (ref 31.5–36.5)
MCV RBC AUTO: 91 FL (ref 78–100)
MONOCYTES # BLD AUTO: 0.7 10E9/L (ref 0–1.3)
MONOCYTES NFR BLD AUTO: 10.5 %
NEUTROPHILS # BLD AUTO: 3.2 10E9/L (ref 1.6–8.3)
NEUTROPHILS NFR BLD AUTO: 48.6 %
PLATELET # BLD AUTO: 299 10E9/L (ref 150–450)
RBC # BLD AUTO: 4.49 10E12/L (ref 3.8–5.2)
WBC # BLD AUTO: 6.6 10E9/L (ref 4–11)

## 2018-03-29 PROCEDURE — 36415 COLL VENOUS BLD VENIPUNCTURE: CPT | Performed by: FAMILY MEDICINE

## 2018-03-29 PROCEDURE — 85025 COMPLETE CBC W/AUTO DIFF WBC: CPT | Performed by: FAMILY MEDICINE

## 2018-04-02 ENCOUNTER — TELEPHONE (OUTPATIENT)
Dept: FAMILY MEDICINE | Facility: CLINIC | Age: 73
End: 2018-04-02

## 2018-04-02 DIAGNOSIS — E78.5 HYPERLIPIDEMIA LDL GOAL <130: Primary | ICD-10-CM

## 2018-04-02 PROBLEM — G62.9 PERIPHERAL POLYNEUROPATHY: Status: ACTIVE | Noted: 2018-04-02

## 2018-04-02 RX ORDER — DULOXETIN HYDROCHLORIDE 20 MG/1
CAPSULE, DELAYED RELEASE ORAL
Qty: 45 CAPSULE | Refills: 1 | Status: SHIPPED | OUTPATIENT
Start: 2018-04-02 | End: 2018-04-09

## 2018-04-02 NOTE — TELEPHONE ENCOUNTER
Dr. Mcclelland,    Patient is wondering if she could try Cymbalta for her neuropathy. Per patient some of her family is taking it for back pain which sparked her interest.    Currently taking gabapentin and states that it doesn't do much for her. Also wanting to stop ibuprofen if she starts cymbalta and it works.     Wondering is she needs to be on a medication for cholesterol after stopping simvastatin. If so, she is wondering about Tricor since she has tried other meds (lipitor etc.) that haven't worked for her.    Please advise.    Jessica Jones RN  [facility]

## 2018-04-02 NOTE — TELEPHONE ENCOUNTER
Read and agree with plan:  1.  Taper gabapentin-2 tabs ×1 day then 1 tab ×1 day then stop.    2.  Then start Cymbalta 20 mg once daily ×2 weeks then twice daily.    3.  Taper ibuprofen 600 mg once daily, then 400 mg once daily, then 200 mg once daily, then stop.    Orders signed please notify, thanks Pedro

## 2018-04-02 NOTE — TELEPHONE ENCOUNTER
Kyler has a question regarding the effectiveness of a medication she is currently taking. She also stated that she stopped taking her high cholesterol medication.     Additionally, Kyler would like the results of her 3/30/18 blood test, which was ordered by Dr. Mcclelland.     The best number to reach Kyler is [phone], [facility].

## 2018-04-03 NOTE — TELEPHONE ENCOUNTER
Dr. Mcclelland    Pt. stopped cholesterol med 1 week ago and pain stopped. She was wondering if she should have her cholesterol level checked in a couple of months or should she wait until her annual in the fall    Gave her detailed instruction regarding tapers, she stated she doesn't take the ibuprofen on a regular basis, she has 600mg and 800mg tabs, she also stated she will not use them unless needed, she will take the smallest dose, cut the 600mg in have and med added, she verbalized understanding  Reviewed her blood test with her    Advise     Kalpana Neal RN   [facility]

## 2018-04-04 NOTE — TELEPHONE ENCOUNTER
Spoke with pt. She will put on her calender in June sometime    Kalpana Neal RN   [facility]

## 2018-04-06 NOTE — PROGRESS NOTES
Normal results, please notify patient. OK to continue medication, recheck in 1 year. Thanks Pedro Mcclelland MD

## 2018-04-09 ENCOUNTER — TELEPHONE (OUTPATIENT)
Dept: FAMILY MEDICINE | Facility: CLINIC | Age: 73
End: 2018-04-09

## 2018-04-09 DIAGNOSIS — G62.9 PERIPHERAL POLYNEUROPATHY: Primary | ICD-10-CM

## 2018-04-09 RX ORDER — GABAPENTIN 300 MG/1
300 CAPSULE ORAL 4 TIMES DAILY PRN
Qty: 360 CAPSULE | Refills: 1 | Status: SHIPPED | OUTPATIENT
Start: 2018-04-09 | End: 2018-09-29

## 2018-04-09 NOTE — TELEPHONE ENCOUNTER
Reason for call:  Other   Patient called regarding (reason for call): appointment and prescription  Additional comments:Pt took cymbalta for 2 days after picking it up from the pharmacy and then stopped taking it due to side effects she was experiencing from it. She does not want to take Cymbalta at all any longer, and would like to begin taking gabapentin again. She was prescribed 300 mg tabs of gabapentin, and would like to know if there is a higher dose she would be able to take.     Phone number to reach patient:  Other phone number:  [phone]    Best Time:  Any    Can we leave a detailed message on this number?  Not Applicable - phone is unable to accept voicemails

## 2018-05-18 DIAGNOSIS — G47.00 INSOMNIA, UNSPECIFIED TYPE: ICD-10-CM

## 2018-05-18 NOTE — TELEPHONE ENCOUNTER
Dr. Mcclelland,     Please review/sign or advise for refill of zolpidem (Ambien) 10 mg tablet.     : Last filled for 20 tabs on 04/10/18  Original prescription: 17 for 20 tabs, 5 refills. Patient has only filled for #40, but script  on 18.  LOV: 17    Jessica Jones RN  [facility]

## 2018-05-18 NOTE — TELEPHONE ENCOUNTER
Requested Prescriptions   Pending Prescriptions Disp Refills     zolpidem (AMBIEN) 10 MG tablet [Pharmacy Med Name: ZOLPIDEM TARTRATE 10 MG TABLET]    Last Written Prescription Date: 11/13/17  Last Fill Quantity: 20,  # refills: 5   Last office visit: 11/13/2017   Future Office Visit: None     20 tablet      Sig: TAKE ONE HALF TABLET NIGHTLY AS NEEDED FOR SLEEP    There is no refill protocol information for this order

## 2018-05-21 RX ORDER — ZOLPIDEM TARTRATE 10 MG/1
TABLET ORAL
Qty: 20 TABLET | Refills: 2 | Status: SHIPPED | OUTPATIENT
Start: 2018-05-21 | End: 2018-09-21

## 2018-09-17 ENCOUNTER — TELEPHONE (OUTPATIENT)
Dept: FAMILY MEDICINE | Facility: CLINIC | Age: 73
End: 2018-09-17

## 2018-09-17 NOTE — TELEPHONE ENCOUNTER
Ok to take one extra only; elevate feet, consider support hose. Look forward to seeing her in a week. Please notify, thanks Pedro

## 2018-09-17 NOTE — TELEPHONE ENCOUNTER
"Dr. Mcclelland,   Do you want patient to increase Maxide until appt.on 9/24?    Patient states LE \"swelling\" has increased in the past couple of weeks.+2 in LE leg area bilaterally and +5 in feet. Patient denies SOB. Patient states she is sedentary most of the day and has not been elevating feet. Patient is taking cozaar and Maxide as directed but patient was wondering if she should increase diuretic due to swelling. Patient also states she has gained some weight recently as well.     Patient scheduled to see Dr. Mcclelland on 9/24 to discuss symptoms and poss. Med adjustment. Patient instructed to wait foe Dr. Mcclelland instruction in order to adjust any meds. Patient to go to ER if SOB, labored breathing occurs.     Thanks! Laila Lambert RN    "

## 2018-09-17 NOTE — TELEPHONE ENCOUNTER
Reason for call:  Other   Patient called regarding (reason for call): call back  Additional comments: Pt would like to speak with a nurse about some swelling in her feet that she has been having for the last year or year and a half, as it's becoming harder and harder for her to walk. She states that the swelling does go down every once in a while, but hardly at all when it does. She isn't sure if anything can be done for it, which is why she hasn't seen anyone for it, but now would like to know if she needs to be seen.     Phone number to reach patient:  Home number on file [phone] (home)    Best Time:  Any    Can we leave a detailed message on this number?  YES

## 2018-09-17 NOTE — TELEPHONE ENCOUNTER
Writer called patient back to relay message from Dr. Mcclelland below. Patient did not answer, No VM.     Triage to attempt later.      Thanks! Laila Lambert RN

## 2018-09-21 DIAGNOSIS — G47.00 INSOMNIA, UNSPECIFIED TYPE: ICD-10-CM

## 2018-09-21 RX ORDER — ZOLPIDEM TARTRATE 10 MG/1
TABLET ORAL
Qty: 20 TABLET | Refills: 2 | Status: SHIPPED | OUTPATIENT
Start: 2018-09-21 | End: 2019-02-01

## 2018-09-21 NOTE — TELEPHONE ENCOUNTER
Dr. Mcclelland-    Please review/sign or advise zolpidem (AMBIEN) 10 MG tablet.  shows medication was filled on 5/21/18, 7/5/18, 8/17/18.     Atiya Chau RN

## 2018-09-21 NOTE — TELEPHONE ENCOUNTER
Controlled Substance Refill Request for zolpidem (AMBIEN) 10 MG tablet  Problem List Complete:  No     PROVIDER TO CONSIDER COMPLETION OF PROBLEM LIST AND OVERVIEW/CONTROLLED SUBSTANCE AGREEMENT    Last Written Prescription Date:  05/21/2018  Last Fill Quantity: 20,   # refills: 2    Last Office Visit with [facility] primary care provider: 11/13/2017    Future Office visit:   Next 5 appointments (look out 90 days)     Sep 24, 2018 11:15 AM CDT   SHORT with Pedro Mcclelland MD   [facility] ([facility])    [address] [place] [id number] [phone]                  Controlled substance agreement on file: No.     Processing:  Patient will  in clinic   checked in past 3 months?  No, route to RN

## 2018-09-24 ENCOUNTER — OFFICE VISIT (OUTPATIENT)
Dept: FAMILY MEDICINE | Facility: CLINIC | Age: 73
End: 2018-09-24
Payer: MEDICARE

## 2018-09-24 VITALS
WEIGHT: 228 LBS | OXYGEN SATURATION: 97 % | BODY MASS INDEX: 42.38 KG/M2 | TEMPERATURE: 97.5 F | SYSTOLIC BLOOD PRESSURE: 98 MMHG | RESPIRATION RATE: 16 BRPM | DIASTOLIC BLOOD PRESSURE: 62 MMHG | HEART RATE: 62 BPM

## 2018-09-24 DIAGNOSIS — M25.562 CHRONIC PAIN OF LEFT KNEE: ICD-10-CM

## 2018-09-24 DIAGNOSIS — I10 ESSENTIAL HYPERTENSION WITH GOAL BLOOD PRESSURE LESS THAN 140/90: ICD-10-CM

## 2018-09-24 DIAGNOSIS — G89.29 CHRONIC PAIN OF LEFT KNEE: ICD-10-CM

## 2018-09-24 DIAGNOSIS — M25.551 HIP PAIN, RIGHT: Primary | ICD-10-CM

## 2018-09-24 DIAGNOSIS — R60.0 LOCALIZED EDEMA: ICD-10-CM

## 2018-09-24 PROCEDURE — 99214 OFFICE O/P EST MOD 30 MIN: CPT | Performed by: FAMILY MEDICINE

## 2018-09-24 RX ORDER — METOPROLOL SUCCINATE 50 MG/1
50 TABLET, EXTENDED RELEASE ORAL DAILY
Qty: 90 TABLET | Refills: 3 | Status: SHIPPED | OUTPATIENT
Start: 2018-09-24 | End: 2019-05-01

## 2018-09-24 NOTE — MR AVS SNAPSHOT
After Visit Summary   9/24/2018    Kyler Whitlock    MRN: [id number]           Patient Information     Date Of Birth          1945        Visit Information        Provider Department      9/24/2018 11:15 AM Pedro Mcclelland MD [facility] [place]        Today's Diagnoses     Hip pain, right    -  1    Chronic pain of left knee        Essential hypertension with goal blood pressure less than 140/90        Localized edema        BMI 40.0-44.9, adult (H)          Care Instructions    Can take a Diuretic, could try compression stocking.           Follow-ups after your visit        Additional Services     ORTHOPEDICS ADULT REFERRAL       Your provider has referred you to: [facility] [place] [phone]   [email or web address]    Please be aware that coverage of these services is subject to the terms and limitations of your health insurance plan.  Call member services at your health plan with any benefit or coverage questions.      Please bring the following to your appointment:    >>   Any x-rays, CTs or MRIs which have been performed.  Contact the facility where they were done to arrange for  prior to your scheduled appointment.    >>   List of current medications   >>   This referral request   >>   Any documents/labs given to you for this referral                  Who to contact     If you have questions or need follow up information about today's clinic visit or your schedule please contact [facility] directly at [phone].  Normal or non-critical lab and imaging results will be communicated to you by MyChart, letter or phone within 4 business days after the clinic has received the results. If you do not hear from us within 7 days, please contact the clinic through MyChart or phone. If you have a critical or abnormal lab result, we will notify you by phone as soon as possible.  Submit refill requests through [employer] or call  your pharmacy and they will forward the refill request to us. Please allow 3 business days for your refill to be completed.          Additional Information About Your Visit        Care EveryWhere ID     This is your Care EveryWhere ID. This could be used by other organizations to access your [facility] medical records  [id number]        Your Vitals Were     Pulse Temperature Respirations Pulse Oximetry BMI (Body Mass Index)       62 97.5  F (36.4  C) (Oral) 16 97% 42.38 kg/m2        Blood Pressure from Last 3 Encounters:   09/24/18 98/62   11/13/17 122/72   09/22/17 114/80    Weight from Last 3 Encounters:   09/24/18 103.4 kg (228 lb)   11/13/17 103.4 kg (228 lb)   09/22/17 103.9 kg (229 lb)              We Performed the Following     Comprehensive metabolic panel     Lipid panel reflex to direct LDL Fasting     ORTHOPEDICS ADULT REFERRAL          Today's Medication Changes          These changes are accurate as of 9/24/18 12:13 PM.  If you have any questions, ask your nurse or doctor.               Start taking these medicines.        Dose/Directions    metoprolol succinate 50 MG 24 hr tablet   Commonly known as:  TOPROL-XL   Used for:  Essential hypertension with goal blood pressure less than 140/90   Started by:  Pedro Mcclelland MD        Dose:  50 mg   Take 1 tablet (50 mg) by mouth daily   Quantity:  90 tablet   Refills:  3         Stop taking these medicines if you haven't already. Please contact your care team if you have questions.     albuterol 108 (90 Base) MCG/ACT inhaler   Commonly known as:  PROAIR HFA/PROVENTIL HFA/VENTOLIN HFA   Stopped by:  Pedro Mcclelland MD           metoprolol tartrate 100 MG tablet   Commonly known as:  LOPRESSOR   Stopped by:  Pedro Mcclelland MD                Where to get your medicines      These medications were sent to [place] [address] [place] [address]  [address] [facility] [address]     Phone:  [phone]     metoprolol  succinate 50 MG 24 hr tablet                Primary Care Provider Office Phone # Fax #    Pedro Mcclelland [phone]       [address] [address]        Equal Access to Services     RAKEL SEGUNDO : Hadvelia gilberto contreras lillian Soiftikhar, waaxda luqadaha, qaybta kaalmada mumtaz, fernando pratibhain hayaadori camilorustam butts ti pina. So [facility] [phone].    ATENCIÓN: Si habla español, tiene a stein disposición servicios gratuitos de asistencia lingüística. [facility] [phone].    We comply with applicable federal civil rights laws and [place] laws. We do not discriminate on the basis of race, color, national origin, age, disability, sex, sexual orientation, or gender identity.            Thank you!     Thank you for [place] [facility] [place]. Our goal is always to provide you with excellent care. Hearing back from our patients is one way we can continue to improve our services. Please take a few minutes to complete the written survey that you may receive in the mail after your visit with us. Thank you!             Your Updated Medication List - Protect others around you: Learn how to safely use, store and throw away your medicines at www.disposemymeds.org.          This list is accurate as of 9/24/18 12:13 PM.  Always use your most recent med list.                   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        D 1000 PO      Take  by mouth daily.        FISH OIL PO      Take  by mouth daily.        FLUZONE HIGH-DOSE injection   Generic drug:  Influenza Vac Split High-Dose/High-Antigen           gabapentin 300 MG capsule    NEURONTIN    360 capsule    Take 1 capsule (300 mg) by mouth 4 times daily as needed    Peripheral polyneuropathy        GLUCOSAMINE PO      Take  by mouth daily.        ibuprofen 600 MG tablet    ADVIL/MOTRIN    90 tablet    Take 1 tablet (600 mg) by mouth 2 times daily as needed for moderate pain    Chronic midline low back pain, with sciatica presence unspecified       losartan 50 MG tablet    COZAAR    90 tablet    Take 1 tablet (50 mg) by mouth daily    Essential hypertension with goal blood pressure less than 140/90       MAGNESIUM ASPARTATE PO      Take  by mouth.        metoprolol succinate 50 MG 24 hr tablet    TOPROL-XL    90 tablet    Take 1 tablet (50 mg) by mouth daily    Essential hypertension with goal blood pressure less than 140/90       nystatin 016992 UNIT/GM Powd    MYCOSTATIN    60 g    Apply 1 g topically 3 times daily as needed    Candidal intertrigo       phentermine 15 MG capsule     30 capsule    Take 1 capsule (15 mg) by mouth every morning    BMI 40.0-44.9, adult (H)       simvastatin 20 MG tablet    ZOCOR    90 tablet    Take 1 tablet (20 mg) by mouth At Bedtime    Hyperlipidemia LDL goal <130       triamcinolone 0.1 % cream    KENALOG    30 g    Apply topically daily as needed for irritation    Candidal vulvovaginitis       triamterene-hydrochlorothiazide 37.5-25 MG per tablet    MAXZIDE-25    90 tablet    TAKE 1 TABLET BY MOUTH DAILY    Essential hypertension with goal blood pressure less than 140/90       vitamin B complex with vitamin C Tabs tablet      Take 1 tablet by mouth daily        * zolpidem 5 MG tablet    AMBIEN    30 tablet    Take 1 tablet (5 mg) by mouth nightly as needed for sleep    Insomnia       * zolpidem 10 MG tablet    AMBIEN    20 tablet    TAKE HALF A TABLET BY MOUTH NIGHLTY AS NEEDED FOR SLEEP    Insomnia, unspecified type       * Notice:  This list has 2 medication(s) that are the same as other medications prescribed for you. Read the directions carefully, and ask your doctor or other care provider to review them with you.

## 2018-09-24 NOTE — PROGRESS NOTES
"  SUBJECTIVE:   Kyler Whitlock is a 73 year old female who presents to clinic today for the following health issues:      Concern - Swelling  Onset: all summer and since last fall    Description:   Legs and feet    Progression of Symptoms:  Swelling is down    Accompanying Signs & Symptoms:  Pain, \"legs and feet are like blocks\", very tight, like they wanted to explode    Previous history of similar problem:   Ongoing since fall 2017    Precipitating factors:   Worsened by: bending ankles    Alleviating factors:  Improved by: increasing water intake    Therapies Tried and outcome: sits on recliner to raise feet a little, walk around the house, increase water intake     Patient reports for concern of swelling in her feet and legs since last fall. Her swelling has gone down, however the pain feels like her legs and feet are blocked. It also feels very tight like they want to \"explode\". Her symptoms worsen when bending her ankles. She has increased her water intake and has tried elevating her legs. Patient hates the way she feels. She has eaten only dinner for 3 days. She denies chest discomfort or trouble breathing.     Other  She would like to discuss increasing Maxzide.       Problem list and histories reviewed & adjusted, as indicated.  Additional history: as documented    Patient Active Problem List   Diagnosis     Esophageal reflux     Left knee pain     Hyperlipidemia LDL goal <130     Nonspecific abnormal electrocardiogram (ECG) (EKG)     Acute stress reaction     Family history of thyroid disease     Sciatica     Candidal vulvovaginitis     HL (hearing loss)     Decreased hearing, bilateral     Gastroesophageal reflux disease, esophagitis presence not specified     Osteoarthritis of multiple joints, unspecified osteoarthritis type     Insomnia, unspecified type     Essential hypertension with goal blood pressure less than 140/90     BMI 40.0-44.9, adult (H)     Peripheral polyneuropathy     Past Surgical " History:   Procedure Laterality Date     CHOLECYSTECTOMY       HERNIORRHAPHY VENTRAL  4/12/2012    Procedure:HERNIORRHAPHY VENTRAL; ventral hernia repair with mesh; Surgeon:ELOISA INMAN; Location:[facility]     HYSTERECTOMY  5/24/01    uterine prolapse/ant. repair       Social History   Substance Use Topics     Smoking status: Never Smoker     Smokeless tobacco: Never Used     Alcohol use No     Family History   Problem Relation Age of Onset     Blood Disease Mother      Depression Mother      Psychotic Disorder Mother      Thyroid Disease Mother      HEART DISEASE Father      C.A.D. Brother      Hypertension Brother      Cerebrovascular Disease Sister      Hypertension Sister      Thyroid Disease Sister      Hypertension Son      C.A.D. Brother      Hypertension Brother      Hypertension Son      Hypertension Son      Circulatory Son      Thyroid Disease Son          Current Outpatient Prescriptions   Medication Sig Dispense Refill     allopurinol (ZYLOPRIM) 100 MG tablet Take 1 tablet (100 mg) by mouth daily 90 tablet 3     Ascorbic Acid (C 500 PO) Take  by mouth daily.       Cholecalciferol (D 1000 PO) Take  by mouth daily.       FLUZONE HIGH-DOSE injection   0     gabapentin (NEURONTIN) 300 MG capsule Take 1 capsule (300 mg) by mouth 4 times daily as needed 360 capsule 1     GLUCOSAMINE PO Take  by mouth daily.       ibuprofen (ADVIL/MOTRIN) 600 MG tablet Take 1 tablet (600 mg) by mouth 2 times daily as needed for moderate pain 90 tablet 1     losartan (COZAAR) 50 MG tablet Take 1 tablet (50 mg) by mouth daily 90 tablet 3     MAGNESIUM ASPARTATE PO Take  by mouth.       metoprolol (LOPRESSOR) 100 MG tablet Take 1 tablet (100 mg) by mouth 2 times daily 180 tablet 3     nystatin (MYCOSTATIN) 318672 UNIT/GM POWD Apply 1 g topically 3 times daily as needed 60 g 1     Omega-3 Fatty Acids (FISH OIL PO) Take  by mouth daily.       phentermine 15 MG capsule Take 1 capsule (15 mg) by mouth every morning 30 capsule 2      simvastatin (ZOCOR) 20 MG tablet Take 1 tablet (20 mg) by mouth At Bedtime 90 tablet 3     triamcinolone (KENALOG) 0.1 % cream Apply topically daily as needed for irritation 30 g 1     triamterene-hydrochlorothiazide (MAXZIDE-25) 37.5-25 MG per tablet TAKE 1 TABLET BY MOUTH DAILY 90 tablet 2     vitamin  B complex with vitamin C (VITAMIN  B COMPLEX) TABS Take 1 tablet by mouth daily       zolpidem (AMBIEN) 10 MG tablet TAKE HALF A TABLET BY MOUTH NIGHLTY AS NEEDED FOR SLEEP 20 tablet 2     zolpidem (AMBIEN) 5 MG tablet Take 1 tablet (5 mg) by mouth nightly as needed for sleep 30 tablet 0     albuterol (PROAIR HFA, PROVENTIL HFA, VENTOLIN HFA) 108 (90 BASE) MCG/ACT inhaler Inhale 2 puffs into the lungs every 6 hours as needed for shortness of breath / dyspnea or wheezing (Patient not taking: Reported on 9/24/2018) 1 Inhaler 11     No Known Allergies  BP Readings from Last 3 Encounters:   09/24/18 98/62   11/13/17 122/72   09/22/17 114/80    Wt Readings from Last 3 Encounters:   09/24/18 103.4 kg (228 lb)   11/13/17 103.4 kg (228 lb)   09/22/17 103.9 kg (229 lb)                  Labs reviewed in EPIC    Reviewed and updated as needed this visit by clinical staff       Reviewed and updated as needed this visit by Provider         ROS:  Denies chest pain, shortness of breath. Remainder of ROS is negative unless otherwise noted above or in HPI.    This document serves as a record of the services and decisions personally performed and made by Pedro Mcclelland MD. It was created on his behalf by Miryam Starr, a trained medical scribe. The creation of this document is based on the provider's statements to the medical scribe.  Miryam Starr 11:42 AM September 24, 2018    OBJECTIVE:     BP 98/62  Pulse 62  Temp 97.5  F (36.4  C) (Oral)  Resp 16  Wt 103.4 kg (228 lb)  SpO2 97%  BMI 42.38 kg/m2  Body mass index is 42.38 kg/(m^2).  GENERAL APPEARANCE: healthy, alert and no distress  RESP: lungs clear to auscultation - no  rales, rhonchi or wheezes  CV: regular rates and rhythm, normal S1 S2, no S3 or S4 and no murmur, click or rub  MS: extremities normal- no gross deformities noted  SKIN: 3+ pitting and non pitting edema, otherwise no suspicious lesions or rashes  PSYCH: mentation appears normal and affect normal/bright    Diagnostic Test Results:  No results found for this or any previous visit (from the past 24 hour(s)).    ASSESSMENT/PLAN:     (M25.551) Hip pain, right  (primary encounter diagnosis)  Comment: Reported by Patient.   Plan: ORTHOPEDICS ADULT REFERRAL    (M25.562,  G89.29) Chronic pain of left knee  Comment: Reported by Patient.     (I10) Essential hypertension with goal blood pressure less than 140/90  Comment: <140/90, At goal.   Plan: metoprolol succinate (TOPROL-XL) 50 MG 24 hr         tablet, Lipid panel reflex to direct LDL         Fasting, Comprehensive metabolic panel    (R60.0) Localized edema  Comment: Reported by Patient.   Plan: Recommended to watch salt intake in diet, can take diuretic, and can try compression stocking.     (Z68.41) BMI 40.0-44.9, adult (H)  Comment: Reported by Patient.   Plan: Patient is going to watch diet.     Patient Instructions   Can take a Diuretic, could try compression stocking.     The information in this document, created by the medical scribe for me, accurately reflects the services I personally performed and the decisions made by me. I have reviewed and approved this document for accuracy prior to leaving the patient care area.  September 24, 2018 12:41 PM    Pedro Mcclelland MD  [facility] [place]

## 2018-09-25 NOTE — TELEPHONE ENCOUNTER
Writer notes patient seen in office visit 9/24/18 and edema addressed    Closing encounter - no further actions needed at this time    Jo Ann Hernandez RN

## 2018-09-29 DIAGNOSIS — M1A.2790 DRUG-INDUCED CHRONIC GOUT OF FOOT WITHOUT TOPHUS, UNSPECIFIED LATERALITY: ICD-10-CM

## 2018-09-29 DIAGNOSIS — G62.9 PERIPHERAL POLYNEUROPATHY: ICD-10-CM

## 2018-10-01 NOTE — TELEPHONE ENCOUNTER
"gabapentin (NEURONTIN) 300 MG capsule      Last Written Prescription Date:  04/09/2018  Last Fill Quantity: 360,   # refills: 1  Last Office Visit: 09/24/2018  Future Office visit:       Routing refill request to provider for review/approval because:  Drug not on the [facility], [facility] or [facility] refill protocol or controlled substance      allopurinol (ZYLOPRIM) 100 MG tablet [Pharmacy Med Name: ALLOPURINOL 100 MG TABLET] 90 tablet 3    Last Written Prescription Date:  11/14/2017  Last Fill Quantity: 90,  # refills: 3   Last office visit: 9/24/2018 with prescribing provider:  09/24/2018   Future Office Visit:     Sig: TAKE 1 TABLET (100 MG) BY MOUTH DAILY    Gout Agents Protocol Failed    9/29/2018  3:40 PM       Failed - Has Uric Acid on file in past 12 months and value is less than 6    Recent Labs   Lab Test  11/13/17   1135   URIC  6.7*     If level is 6mg/dL or greater, ok to refill one time and refer to provider.          Passed - CBC on file in past 12 months    Recent Labs   Lab Test  03/29/18   1056   WBC  6.6   RBC  4.49   HGB  13.2   HCT  40.8   PLT  299       For [facility] ONLY: [id number] = WBC, [id number] = RBC         Passed - ALT on file in past 12 months    Recent Labs   Lab Test  11/13/17   1135   ALT  23            Passed - Recent (12 mo) or future (30 days) visit within the authorizing provider's specialty    Patient had office visit in the last 12 months or has a visit in the next 30 days with authorizing provider or within the authorizing provider's specialty.  See \"Patient Info\" tab in inbasket, or \"Choose Columns\" in Meds & Orders section of the refill encounter.           Passed - Patient is age 18 or older       Passed - No active pregnancy on record       Passed - Normal serum creatinine on file in the past 12 months    Recent Labs   Lab Test  11/13/17   1135   CR  0.86            Passed - No positive pregnancy test in past year            "

## 2018-10-02 NOTE — TELEPHONE ENCOUNTER
Routing refill request to provider for review/approval because patient has not had uric acid level done for over one year and requesting clarification of neurontin order.    Susan Montoya RN  [facility] pracxrice

## 2018-10-03 RX ORDER — ALLOPURINOL 100 MG/1
TABLET ORAL
Qty: 90 TABLET | Refills: 3 | Status: SHIPPED | OUTPATIENT
Start: 2018-10-03 | End: 2019-10-10

## 2018-10-03 RX ORDER — GABAPENTIN 300 MG/1
CAPSULE ORAL
Qty: 360 CAPSULE | Refills: 1 | Status: SHIPPED | OUTPATIENT
Start: 2018-10-03 | End: 2018-12-05

## 2018-10-03 NOTE — TELEPHONE ENCOUNTER
As patient has mobility difficulties, will place future order for next time she's here. Order signed, please notify, thanks Pedro

## 2018-10-04 NOTE — TELEPHONE ENCOUNTER
"Requested Prescriptions   Pending Prescriptions Disp Refills     metoprolol tartrate (LOPRESSOR) 100 MG tablet [Pharmacy Med Name: METOPROLOL TARTR 100MG TAB]    Last Written Prescription Date:  11-13-17  Last Fill Quantity: 180,  # refills: 3   Last office visit: 9/24/2018 with prescribing provider:  Dr. Mcclelland   Future Office Visit:      No longer active on patients chart. Ended 9-24-18   180 tablet 0     Sig: TAKE 1 TABLET (100 MG) BY MOUTH 2 TIMES DAILY    Beta-Blockers Protocol Passed    10/4/2018  4:16 AM       Passed - Blood pressure under 140/90 in past 12 months    BP Readings from Last 3 Encounters:   09/24/18 98/62   11/13/17 122/72   09/22/17 114/80                Passed - Patient is age 6 or older       Passed - Recent (12 mo) or future (30 days) visit within the authorizing provider's specialty    Patient had office visit in the last 12 months or has a visit in the next 30 days with authorizing provider or within the authorizing provider's specialty.  See \"Patient Info\" tab in inbasket, or \"Choose Columns\" in Meds & Orders section of the refill encounter.              "

## 2018-10-05 RX ORDER — METOPROLOL TARTRATE 100 MG
TABLET ORAL
Qty: 0.01 TABLET | Refills: 0 | OUTPATIENT
Start: 2018-10-05

## 2018-10-05 NOTE — TELEPHONE ENCOUNTER
Refused Prescriptions:                       Disp   Refills    metoprolol tartrate (LOPRESSOR) 100 MG tab*0.01 t*0        Sig: TAKE 1 TABLET (100 MG) BY MOUTH 2 TIMES DAILY  Refused By: JOSE LUIS GOMEZ  Reason for Refusal: Incorrect Dose      Jose Luis Gomez, RN  [facility]

## 2018-12-05 ENCOUNTER — OFFICE VISIT (OUTPATIENT)
Dept: FAMILY MEDICINE | Facility: CLINIC | Age: 73
End: 2018-12-05
Payer: MEDICARE

## 2018-12-05 VITALS
BODY MASS INDEX: 37.74 KG/M2 | WEIGHT: 203 LBS | TEMPERATURE: 98.7 F | SYSTOLIC BLOOD PRESSURE: 118 MMHG | RESPIRATION RATE: 18 BRPM | HEART RATE: 71 BPM | OXYGEN SATURATION: 91 % | DIASTOLIC BLOOD PRESSURE: 72 MMHG

## 2018-12-05 DIAGNOSIS — G62.9 PERIPHERAL POLYNEUROPATHY: ICD-10-CM

## 2018-12-05 DIAGNOSIS — E78.5 HYPERLIPIDEMIA LDL GOAL <130: ICD-10-CM

## 2018-12-05 DIAGNOSIS — R42 LIGHT HEADED: Primary | ICD-10-CM

## 2018-12-05 PROCEDURE — 99214 OFFICE O/P EST MOD 30 MIN: CPT | Performed by: FAMILY MEDICINE

## 2018-12-05 RX ORDER — GABAPENTIN 300 MG/1
CAPSULE ORAL
Qty: 30 CAPSULE | Refills: 1 | Status: SHIPPED | OUTPATIENT
Start: 2018-12-05 | End: 2019-08-12

## 2018-12-05 RX ORDER — GABAPENTIN 300 MG/1
CAPSULE ORAL
Qty: 360 CAPSULE | Refills: 1 | Status: SHIPPED | OUTPATIENT
Start: 2018-12-05 | End: 2018-12-05

## 2018-12-05 ASSESSMENT — PATIENT HEALTH QUESTIONNAIRE - PHQ9: SUM OF ALL RESPONSES TO PHQ QUESTIONS 1-9: 8

## 2018-12-05 NOTE — TELEPHONE ENCOUNTER
gabapentin (NEURONTIN) 300 MG capsule      Last Written Prescription Date:  12/5/18  Last Fill Quantity: 360,   # refills: 1  Last Office Visit: 12/5/18  Future Office visit:       Routing refill request to provider for review/approval because:  Drug not on the [facility], [facility] or [facility] refill protocol or controlled substance

## 2018-12-05 NOTE — MR AVS SNAPSHOT
After Visit Summary   12/5/2018    Kyler Whitlock    MRN: [id number]           Patient Information     Date Of Birth          1945        Visit Information        Provider Department      12/5/2018 10:30 AM Pedro Mcclelland MD [facility]        Today's Diagnoses     Light headed    -  1    Peripheral polyneuropathy        Hyperlipidemia LDL goal <130          Care Instructions    Begin tapering off Gabapentin. Call [phone] to schedule labs.          Follow-ups after your visit        Follow-up notes from your care team     Return in about 4 months (around 4/5/2019).      Who to contact     If you have questions or need follow up information about today's clinic visit or your schedule please contact [facility] directly at [phone].  Normal or non-critical lab and imaging results will be communicated to you by MyChart, letter or phone within 4 business days after the clinic has received the results. If you do not hear from us within 7 days, please contact the clinic through MyChart or phone. If you have a critical or abnormal lab result, we will notify you by phone as soon as possible.  Submit refill requests through [employer] or call your pharmacy and they will forward the refill request to us. Please allow 3 business days for your refill to be completed.          Additional Information About Your Visit        Care EveryWhere ID     This is your Care EveryWhere ID. This could be used by other organizations to access your [place] medical records  [id number]        Your Vitals Were     Pulse Temperature Respirations Pulse Oximetry BMI (Body Mass Index)       71 98.7  F (37.1  C) (Temporal) 18 91% 37.74 kg/m2        Blood Pressure from Last 3 Encounters:   12/05/18 118/72   09/24/18 98/62   11/13/17 122/72    Weight from Last 3 Encounters:   12/05/18 203 lb (92.1 kg)   09/24/18 228 lb (103.4 kg)   11/13/17 228 lb (103.4 kg)              We Performed  the Following     Comprehensive metabolic panel     Lipid panel reflex to direct LDL Fasting          Today's Medication Changes          These changes are accurate as of 12/5/18 11:04 AM.  If you have any questions, ask your nurse or doctor.               These medicines have changed or have updated prescriptions.        Dose/Directions    gabapentin 300 MG capsule   Commonly known as:  NEURONTIN   This may have changed:  See the new instructions.   Used for:  Peripheral polyneuropathy   Changed by:  Pedro Mcclelland MD        Take twice daily x 1 week, then take once daily x 1 week, then stop and take as needed.   Quantity:  360 capsule   Refills:  1            Where to get your medicines      These medications were sent to [place] [address] [place] [facility] [address] [place] [address]  [address] [facility] [address]     Phone:  [phone]     gabapentin 300 MG capsule                Primary Care Provider Office Phone # Fax #    Pedro Mcclelland [phone]       [address] [facility] [address]  [facility] [address]        Equal Access to Services     [facility]: Hadii aad ku hadasho Soomaali, waaxda luqadaha, qaybta kaalmada adeegyada, waxay idiin hayaan adeeg khararadha luke . [facility] [phone].    ATENCIÓN: Si habla español, tiene a stein disposición servicios gratuitos de asistencia lingüística. [facility] [phone].    We comply with applicable federal civil rights laws and [place] laws. We do not discriminate on the basis of race, color, national origin, age, disability, sex, sexual orientation, or gender identity.            Thank you!     Thank you for choosing [facility]  for your care. Our goal is always to provide you with excellent care. Hearing back from our patients is one way we can continue to improve our services. Please take a few minutes to complete the written survey that you may receive in the mail after your visit with us. Thank you!              Your Updated Medication List - Protect others around you: Learn how to safely use, store and throw away your medicines at www.disposemymeds.org.          This list is accurate as of 12/5/18 11:04 AM.  Always use your most recent med list.                   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        cyclobenzaprine 10 MG tablet    FLEXERIL    30 tablet    TAKE 1/2 TO 1 TABLET (5-10 MG) BY MOUTH 3 TIMES DAILY AS NEEDED FOR MUSCLE SPASMS    Chronic midline low back pain, with sciatica presence unspecified       D 1000 PO      Take  by mouth daily.        FISH OIL PO      Take  by mouth daily.        FLUZONE HIGH-DOSE injection   Generic drug:  Influenza Vac Split High-Dose/High-Antigen           gabapentin 300 MG capsule    NEURONTIN    360 capsule    Take twice daily x 1 week, then take once daily x 1 week, then stop and take as needed.    Peripheral polyneuropathy       GLUCOSAMINE PO      Take  by mouth daily.        ibuprofen 600 MG tablet    ADVIL/MOTRIN    90 tablet    Take 1 tablet (600 mg) by mouth 2 times daily as needed for moderate pain    Chronic midline low back pain, with sciatica presence unspecified       losartan 50 MG tablet    COZAAR    90 tablet    Take 1 tablet (50 mg) by mouth daily    Essential hypertension with goal blood pressure less than 140/90       MAGNESIUM ASPARTATE PO      Take  by mouth.        metoprolol succinate ER 50 MG 24 hr tablet    TOPROL-XL    90 tablet    Take 1 tablet (50 mg) by mouth daily    Essential hypertension with goal blood pressure less than 140/90       nystatin 446938 UNIT/GM external powder    MYCOSTATIN    60 g    Apply 1 g topically 3 times daily as needed    Candidal intertrigo       phentermine 15 MG capsule    ADIPEX-P    30 capsule     Take 1 capsule (15 mg) by mouth every morning    BMI 40.0-44.9, adult (H)       triamcinolone 0.1 % external cream    KENALOG    30 g    Apply topically daily as needed for irritation    Candidal vulvovaginitis       triamterene-HCTZ 37.5-25 MG tablet    MAXZIDE-25    90 tablet    TAKE 1 TABLET BY MOUTH DAILY    Essential hypertension with goal blood pressure less than 140/90       vitamin B complex with vitamin C tablet      Take 1 tablet by mouth daily        * zolpidem 5 MG tablet    AMBIEN    30 tablet    Take 1 tablet (5 mg) by mouth nightly as needed for sleep    Insomnia       * zolpidem 10 MG tablet    AMBIEN    20 tablet    TAKE HALF A TABLET BY MOUTH NIGHLTY AS NEEDED FOR SLEEP    Insomnia, unspecified type       * Notice:  This list has 2 medication(s) that are the same as other medications prescribed for you. Read the directions carefully, and ask your doctor or other care provider to review them with you.

## 2018-12-05 NOTE — TELEPHONE ENCOUNTER
Routing refill request to provider for review/approval because:  Drug not on the [facility] refill protocol     Unable to access  D/T upgrade    Kalpana Neal RN   [facility]

## 2018-12-05 NOTE — PROGRESS NOTES
SUBJECTIVE:   Kyler Whitlock is a 73 year old female who presents to clinic today for the following health issues:    Hypertension Follow-up      Outpatient blood pressures are not being checked.    Low Salt Diet: no added salt    Amount of exercise or physical activity: None    Problems taking medications regularly: No    Medication side effects: none    Diet: low salt      MS  She reports that her feet will start burning if she is out shopping for 3 or more hours.    Neuro  She is experiencing light headedness. She reports visual prisms that last approximately 10 minutes and are increasing in frequency.    Vision Screening  Patient has an Ophthamology appointment tomorrow.    Social History  Patient has been feeling lonely as she does not get to see her grandchildren as much as she would like. She lives at home. Patient is still driving.    Diet/Exercise  Patient has been limiting her calories and has lost 25 pounds since her last visit. She only drinks water, and does not drink very much milk or soda.    Medications  Patient takes Gabapentin 3 times per day. She has muscle relaxers at home but only takes one occasionally.      Problem list and histories reviewed & adjusted, as indicated.  Additional history: as documented    Patient Active Problem List   Diagnosis     Esophageal reflux     Left knee pain     Hyperlipidemia LDL goal <130     Nonspecific abnormal electrocardiogram (ECG) (EKG)     Acute stress reaction     Family history of thyroid disease     Sciatica     Candidal vulvovaginitis     HL (hearing loss)     Decreased hearing, bilateral     Gastroesophageal reflux disease, esophagitis presence not specified     Osteoarthritis of multiple joints, unspecified osteoarthritis type     Insomnia, unspecified type     Essential hypertension with goal blood pressure less than 140/90     BMI 40.0-44.9, adult (H)     Peripheral polyneuropathy     Past Surgical History:   Procedure Laterality Date      CHOLECYSTECTOMY       HERNIORRHAPHY VENTRAL  4/12/2012    Procedure:HERNIORRHAPHY VENTRAL; ventral hernia repair with mesh; Surgeon:ELOISA INMAN; Location:[facility]     HYSTERECTOMY  5/24/01    uterine prolapse/ant. repair       Social History   Substance Use Topics     Smoking status: Never Smoker     Smokeless tobacco: Never Used     Alcohol use No     Family History   Problem Relation Age of Onset     Blood Disease Mother      Depression Mother      Psychotic Disorder Mother      Thyroid Disease Mother      Heart Disease Father      C.A.D. Brother      Hypertension Brother      Cerebrovascular Disease Sister      Hypertension Sister      Thyroid Disease Sister      Hypertension Son      C.A.D. Brother      Hypertension Brother      Hypertension Son      Hypertension Son      Circulatory Son      Thyroid Disease Son          Current Outpatient Prescriptions   Medication Sig Dispense Refill     allopurinol (ZYLOPRIM) 100 MG tablet TAKE 1 TABLET (100 MG) BY MOUTH DAILY 90 tablet 3     Ascorbic Acid (C 500 PO) Take  by mouth daily.       Cholecalciferol (D 1000 PO) Take  by mouth daily.       cyclobenzaprine (FLEXERIL) 10 MG tablet TAKE 1/2 TO 1 TABLET (5-10 MG) BY MOUTH 3 TIMES DAILY AS NEEDED FOR MUSCLE SPASMS 30 tablet 2     FLUZONE HIGH-DOSE injection   0     gabapentin (NEURONTIN) 300 MG capsule TAKE 1 CAPSULE (300 MG) BY MOUTH 4 TIMES DAILY AS NEEDED 360 capsule 1     GLUCOSAMINE PO Take  by mouth daily.       ibuprofen (ADVIL/MOTRIN) 600 MG tablet Take 1 tablet (600 mg) by mouth 2 times daily as needed for moderate pain 90 tablet 1     losartan (COZAAR) 50 MG tablet Take 1 tablet (50 mg) by mouth daily 90 tablet 3     MAGNESIUM ASPARTATE PO Take  by mouth.       metoprolol succinate (TOPROL-XL) 50 MG 24 hr tablet Take 1 tablet (50 mg) by mouth daily 90 tablet 3     nystatin (MYCOSTATIN) 094099 UNIT/GM POWD Apply 1 g topically 3 times daily as needed 60 g 1     Omega-3 Fatty Acids (FISH OIL PO) Take  by mouth  daily.       phentermine 15 MG capsule Take 1 capsule (15 mg) by mouth every morning 30 capsule 2     simvastatin (ZOCOR) 20 MG tablet Take 1 tablet (20 mg) by mouth At Bedtime 90 tablet 3     triamcinolone (KENALOG) 0.1 % cream Apply topically daily as needed for irritation 30 g 1     triamterene-hydrochlorothiazide (MAXZIDE-25) 37.5-25 MG per tablet TAKE 1 TABLET BY MOUTH DAILY 90 tablet 2     vitamin  B complex with vitamin C (VITAMIN  B COMPLEX) TABS Take 1 tablet by mouth daily       zolpidem (AMBIEN) 10 MG tablet TAKE HALF A TABLET BY MOUTH NIGHLTY AS NEEDED FOR SLEEP 20 tablet 2     zolpidem (AMBIEN) 5 MG tablet Take 1 tablet (5 mg) by mouth nightly as needed for sleep 30 tablet 0     No Known Allergies  BP Readings from Last 3 Encounters:   12/05/18 118/72   09/24/18 98/62   11/13/17 122/72    Wt Readings from Last 3 Encounters:   12/05/18 92.1 kg (203 lb)   09/24/18 103.4 kg (228 lb)   11/13/17 103.4 kg (228 lb)                  Labs reviewed in EPIC    Reviewed and updated as needed this visit by clinical staff       Reviewed and updated as needed this visit by Provider         ROS:  Remainder of ROS is negative unless otherwise noted above or in HPI.    This document serves as a record of the services and decisions personally performed and made by Pedro Mcclelland MD. It was created on his behalf by Rm Gillis, trained medical scribe. The creation of this document is based on the provider's statements to the medical scribe.  Rm Glilis 10:45 AM December 5, 2018  OBJECTIVE:     /72 (BP Location: Right arm, Patient Position: Sitting, Cuff Size: Adult Large)  Pulse 71  Temp 98.7  F (37.1  C) (Temporal)  Resp 18  Wt 92.1 kg (203 lb)  SpO2 91%  BMI 37.74 kg/m2  Body mass index is 37.74 kg/(m^2).  GENERAL: healthy, alert and no distress  PSYCH: mentation appears normal, affect normal/bright    Diagnostic Test Results:  Results for orders placed or performed in visit on 03/29/18   CBC with  platelets differential   Result Value Ref Range    WBC 6.6 4.0 - 11.0 10e9/L    RBC Count 4.49 3.8 - 5.2 10e12/L    Hemoglobin 13.2 11.7 - 15.7 g/dL    Hematocrit 40.8 35.0 - 47.0 %    MCV 91 78 - 100 fl    MCH 29.4 26.5 - 33.0 pg    MCHC 32.4 31.5 - 36.5 g/dL    RDW 13.5 10.0 - 15.0 %    Platelet Count 299 150 - 450 10e9/L    Diff Method Automated Method     % Neutrophils 48.6 %    % Lymphocytes 38.4 %    % Monocytes 10.5 %    % Eosinophils 2.0 %    % Basophils 0.5 %    Absolute Neutrophil 3.2 1.6 - 8.3 10e9/L    Absolute Lymphocytes 2.5 0.8 - 5.3 10e9/L    Absolute Monocytes 0.7 0.0 - 1.3 10e9/L    Absolute Eosinophils 0.1 0.0 - 0.7 10e9/L    Absolute Basophils 0.0 0.0 - 0.2 10e9/L       ASSESSMENT/PLAN:   (R42) Light headed  (primary encounter diagnosis)  Comment: Lab work pending.  Plan: Comprehensive metabolic panel        Patient will complete lab work at [facility]. Follow up as needed.    (G62.9) Peripheral polyneuropathy  Comment: Stable.   Plan: gabapentin (NEURONTIN) 300 MG capsule        Advised patient to begin tapering off Gabapentin as directed. Follow up as needed.    (E78.5) Hyperlipidemia LDL goal <130  Comment: Lab work pending.  Plan: Lipid panel reflex to direct LDL Fasting        Patient will complete lab work at [facility]. Follow up as needed.      Patient Instructions   Begin tapering off Gabapentin. Call [phone] to schedule labs.        The information in this document, created by the medical scribe for me, accurately reflects the services I personally performed and the decisions made by me. I have reviewed and approved this document for accuracy prior to leaving the patient care area.  December 5, 2018 10:46 AM    Pedro Mcclelland MD  [facility]

## 2018-12-07 DIAGNOSIS — E78.5 HYPERLIPIDEMIA LDL GOAL <130: ICD-10-CM

## 2018-12-07 DIAGNOSIS — R42 LIGHT HEADED: ICD-10-CM

## 2018-12-07 DIAGNOSIS — M1A.2790 DRUG-INDUCED CHRONIC GOUT OF FOOT WITHOUT TOPHUS, UNSPECIFIED LATERALITY: ICD-10-CM

## 2018-12-07 PROCEDURE — 80053 COMPREHEN METABOLIC PANEL: CPT | Performed by: FAMILY MEDICINE

## 2018-12-07 PROCEDURE — 36415 COLL VENOUS BLD VENIPUNCTURE: CPT | Performed by: FAMILY MEDICINE

## 2018-12-07 PROCEDURE — 84550 ASSAY OF BLOOD/URIC ACID: CPT | Performed by: FAMILY MEDICINE

## 2018-12-07 PROCEDURE — 80061 LIPID PANEL: CPT | Performed by: FAMILY MEDICINE

## 2018-12-08 DIAGNOSIS — E83.52 HYPERCALCEMIA: Primary | ICD-10-CM

## 2018-12-08 LAB
ALBUMIN SERPL-MCNC: 3.7 G/DL (ref 3.4–5)
ALP SERPL-CCNC: 64 U/L (ref 40–150)
ALT SERPL W P-5'-P-CCNC: 29 U/L (ref 0–50)
ANION GAP SERPL CALCULATED.3IONS-SCNC: 10 MMOL/L (ref 3–14)
AST SERPL W P-5'-P-CCNC: 27 U/L (ref 0–45)
BILIRUB SERPL-MCNC: 0.6 MG/DL (ref 0.2–1.3)
BUN SERPL-MCNC: 30 MG/DL (ref 7–30)
CALCIUM SERPL-MCNC: 12.3 MG/DL (ref 8.5–10.1)
CHLORIDE SERPL-SCNC: 97 MMOL/L (ref 94–109)
CHOLEST SERPL-MCNC: 246 MG/DL
CO2 SERPL-SCNC: 30 MMOL/L (ref 20–32)
CREAT SERPL-MCNC: 1.53 MG/DL (ref 0.52–1.04)
GFR SERPL CREATININE-BSD FRML MDRD: 33 ML/MIN/1.7M2
GLUCOSE SERPL-MCNC: 89 MG/DL (ref 70–99)
HDLC SERPL-MCNC: 61 MG/DL
LDLC SERPL CALC-MCNC: 161 MG/DL
NONHDLC SERPL-MCNC: 185 MG/DL
POTASSIUM SERPL-SCNC: 4.5 MMOL/L (ref 3.4–5.3)
PROT SERPL-MCNC: 7.2 G/DL (ref 6.8–8.8)
SODIUM SERPL-SCNC: 137 MMOL/L (ref 133–144)
TRIGL SERPL-MCNC: 118 MG/DL
URATE SERPL-MCNC: 7.5 MG/DL (ref 2.6–6)

## 2018-12-10 DIAGNOSIS — E78.5 HYPERLIPIDEMIA LDL GOAL <130: ICD-10-CM

## 2018-12-10 DIAGNOSIS — E83.52 HYPERCALCEMIA: ICD-10-CM

## 2018-12-10 LAB
ALBUMIN UR-MCNC: NEGATIVE MG/DL
APPEARANCE UR: CLEAR
BILIRUB UR QL STRIP: ABNORMAL
COLOR UR AUTO: YELLOW
GLUCOSE UR STRIP-MCNC: NEGATIVE MG/DL
HGB UR QL STRIP: NEGATIVE
KETONES UR STRIP-MCNC: NEGATIVE MG/DL
LEUKOCYTE ESTERASE UR QL STRIP: NEGATIVE
NITRATE UR QL: NEGATIVE
PH UR STRIP: 6 PH (ref 5–7)
PTH-INTACT SERPL-MCNC: 8 PG/ML (ref 18–80)
SOURCE: ABNORMAL
SP GR UR STRIP: 1.01 (ref 1–1.03)
UROBILINOGEN UR STRIP-ACNC: 0.2 EU/DL (ref 0.2–1)

## 2018-12-10 PROCEDURE — 83970 ASSAY OF PARATHORMONE: CPT | Performed by: FAMILY MEDICINE

## 2018-12-10 PROCEDURE — 82330 ASSAY OF CALCIUM: CPT | Performed by: FAMILY MEDICINE

## 2018-12-10 PROCEDURE — 80061 LIPID PANEL: CPT | Performed by: FAMILY MEDICINE

## 2018-12-10 PROCEDURE — 80048 BASIC METABOLIC PNL TOTAL CA: CPT | Performed by: FAMILY MEDICINE

## 2018-12-10 PROCEDURE — 71046 X-RAY EXAM CHEST 2 VIEWS: CPT

## 2018-12-10 PROCEDURE — 84443 ASSAY THYROID STIM HORMONE: CPT | Performed by: FAMILY MEDICINE

## 2018-12-10 PROCEDURE — 36415 COLL VENOUS BLD VENIPUNCTURE: CPT | Performed by: FAMILY MEDICINE

## 2018-12-10 PROCEDURE — 82043 UR ALBUMIN QUANTITATIVE: CPT | Performed by: FAMILY MEDICINE

## 2018-12-10 PROCEDURE — 81003 URINALYSIS AUTO W/O SCOPE: CPT | Performed by: FAMILY MEDICINE

## 2018-12-11 LAB
ANION GAP SERPL CALCULATED.3IONS-SCNC: 9 MMOL/L (ref 3–14)
BUN SERPL-MCNC: 21 MG/DL (ref 7–30)
CA-I SERPL ISE-MCNC: 5.6 MG/DL (ref 4.4–5.2)
CALCIUM SERPL-MCNC: 11.7 MG/DL (ref 8.5–10.1)
CHLORIDE SERPL-SCNC: 98 MMOL/L (ref 94–109)
CHOLEST SERPL-MCNC: 243 MG/DL
CO2 SERPL-SCNC: 29 MMOL/L (ref 20–32)
CREAT SERPL-MCNC: 1.2 MG/DL (ref 0.52–1.04)
CREAT UR-MCNC: 246 MG/DL
GFR SERPL CREATININE-BSD FRML MDRD: 44 ML/MIN/1.7M2
GLUCOSE SERPL-MCNC: 109 MG/DL (ref 70–99)
HDLC SERPL-MCNC: 62 MG/DL
LDLC SERPL CALC-MCNC: 154 MG/DL
MICROALBUMIN UR-MCNC: 20 MG/L
MICROALBUMIN/CREAT UR: 8.25 MG/G CR (ref 0–25)
NONHDLC SERPL-MCNC: 181 MG/DL
POTASSIUM SERPL-SCNC: 4.4 MMOL/L (ref 3.4–5.3)
SODIUM SERPL-SCNC: 136 MMOL/L (ref 133–144)
TRIGL SERPL-MCNC: 135 MG/DL
TSH SERPL DL<=0.005 MIU/L-ACNC: 2.43 MU/L (ref 0.4–4)

## 2018-12-12 ENCOUNTER — OFFICE VISIT (OUTPATIENT)
Dept: FAMILY MEDICINE | Facility: CLINIC | Age: 73
End: 2018-12-12
Payer: MEDICARE

## 2018-12-12 VITALS
HEART RATE: 66 BPM | TEMPERATURE: 97.3 F | BODY MASS INDEX: 37.97 KG/M2 | OXYGEN SATURATION: 97 % | SYSTOLIC BLOOD PRESSURE: 130 MMHG | DIASTOLIC BLOOD PRESSURE: 60 MMHG | RESPIRATION RATE: 16 BRPM | HEIGHT: 62 IN | WEIGHT: 206.3 LBS

## 2018-12-12 DIAGNOSIS — M25.562 CHRONIC PAIN OF LEFT KNEE: Primary | ICD-10-CM

## 2018-12-12 DIAGNOSIS — R53.83 TIRED: ICD-10-CM

## 2018-12-12 DIAGNOSIS — G89.29 CHRONIC PAIN OF LEFT KNEE: Primary | ICD-10-CM

## 2018-12-12 DIAGNOSIS — I10 ESSENTIAL HYPERTENSION WITH GOAL BLOOD PRESSURE LESS THAN 140/90: ICD-10-CM

## 2018-12-12 PROCEDURE — 99214 OFFICE O/P EST MOD 30 MIN: CPT | Performed by: FAMILY MEDICINE

## 2018-12-12 RX ORDER — TRIAMTERENE/HYDROCHLOROTHIAZID 37.5-25 MG
0.5 TABLET ORAL DAILY
Qty: 45 TABLET | Refills: 3 | COMMUNITY
Start: 2018-12-12 | End: 2019-03-15

## 2018-12-12 ASSESSMENT — MIFFLIN-ST. JEOR: SCORE: 1376.08

## 2018-12-12 NOTE — PATIENT INSTRUCTIONS
Try half a tab of Maxzide-25. Contact me on the phone if it is not helping get rid of retained fluid.

## 2018-12-12 NOTE — PROGRESS NOTES
SUBJECTIVE:   Kyler Whitlock is a 73 year old female who presents to clinic today for the following health issues:    Patient presents for a follow up regarding her most recent lab results.     MS  Patient relates having some edema in her extremities recently.     Patient has pain on both knees. She has had injections in the past that greatly reduced her pain, she is interested in seeing the same doctor that gave her this treatment in the past.      She expresses waking up often to urinate, sometimes several times per night.    Family Medical History  Her mother and sister have thyroid problems, she does not recall what specific diagnosis they had.    Medications  Patient has not been taking hydrochlorothiazide since Saturday 12/8. She reports gaining 3 pounds since that time and would like to start taking it again.    Problem list and histories reviewed & adjusted, as indicated.  Additional history: as documented    Patient Active Problem List   Diagnosis     Esophageal reflux     Left knee pain     Hyperlipidemia LDL goal <130     Nonspecific abnormal electrocardiogram (ECG) (EKG)     Acute stress reaction     Family history of thyroid disease     Sciatica     HL (hearing loss)     Decreased hearing, bilateral     Gastroesophageal reflux disease, esophagitis presence not specified     Osteoarthritis of multiple joints, unspecified osteoarthritis type     Insomnia, unspecified type     Essential hypertension with goal blood pressure less than 140/90     BMI 40.0-44.9, adult (H)     Peripheral polyneuropathy     Past Surgical History:   Procedure Laterality Date     CHOLECYSTECTOMY       HERNIORRHAPHY VENTRAL  4/12/2012    Procedure:HERNIORRHAPHY VENTRAL; ventral hernia repair with mesh; Surgeon:ELOISA INMAN; Location:[facility]     HYSTERECTOMY  5/24/01    uterine prolapse/ant. repair       Social History     Tobacco Use     Smoking status: Never Smoker     Smokeless tobacco: Never Used   Substance Use Topics      Alcohol use: No     Family History   Problem Relation Age of Onset     Blood Disease Mother      Depression Mother      Psychotic Disorder Mother      Thyroid Disease Mother      Heart Disease Father      C.A.D. Brother      Hypertension Brother      Cerebrovascular Disease Sister      Hypertension Sister      Thyroid Disease Sister      Hypertension Son      C.A.D. Brother      Hypertension Brother      Hypertension Son      Hypertension Son      Circulatory Son      Thyroid Disease Son          Current Outpatient Medications   Medication Sig Dispense Refill     allopurinol (ZYLOPRIM) 100 MG tablet TAKE 1 TABLET (100 MG) BY MOUTH DAILY 90 tablet 3     Ascorbic Acid (C 500 PO) Take  by mouth daily.       Cholecalciferol (D 1000 PO) Take  by mouth daily.       cyclobenzaprine (FLEXERIL) 10 MG tablet TAKE 1/2 TO 1 TABLET (5-10 MG) BY MOUTH 3 TIMES DAILY AS NEEDED FOR MUSCLE SPASMS 30 tablet 2     FLUZONE HIGH-DOSE injection   0     gabapentin (NEURONTIN) 300 MG capsule Take twice daily x 1 week, then take once daily x 1 week, then stop and take as needed. 30 capsule 1     GLUCOSAMINE PO Take  by mouth daily.       ibuprofen (ADVIL/MOTRIN) 600 MG tablet Take 1 tablet (600 mg) by mouth 2 times daily as needed for moderate pain 90 tablet 1     losartan (COZAAR) 50 MG tablet Take 1 tablet (50 mg) by mouth daily 90 tablet 3     MAGNESIUM ASPARTATE PO Take  by mouth.       metoprolol succinate (TOPROL-XL) 50 MG 24 hr tablet Take 1 tablet (50 mg) by mouth daily 90 tablet 3     nystatin (MYCOSTATIN) 415340 UNIT/GM POWD Apply 1 g topically 3 times daily as needed 60 g 1     Omega-3 Fatty Acids (FISH OIL PO) Take  by mouth daily.       phentermine 15 MG capsule Take 1 capsule (15 mg) by mouth every morning 30 capsule 2     triamcinolone (KENALOG) 0.1 % cream Apply topically daily as needed for irritation 30 g 1     triamterene-hydrochlorothiazide (MAXZIDE-25) 37.5-25 MG per tablet TAKE 1 TABLET BY MOUTH DAILY 90 tablet 2  "    vitamin  B complex with vitamin C (VITAMIN  B COMPLEX) TABS Take 1 tablet by mouth daily       zolpidem (AMBIEN) 10 MG tablet TAKE HALF A TABLET BY MOUTH NIGHLTY AS NEEDED FOR SLEEP 20 tablet 2     zolpidem (AMBIEN) 5 MG tablet Take 1 tablet (5 mg) by mouth nightly as needed for sleep 30 tablet 0     No Known Allergies  BP Readings from Last 3 Encounters:   12/12/18 130/60   12/05/18 118/72   09/24/18 98/62    Wt Readings from Last 3 Encounters:   12/12/18 93.6 kg (206 lb 4.8 oz)   12/05/18 92.1 kg (203 lb)   09/24/18 103.4 kg (228 lb)                  Labs reviewed in EPIC    Reviewed and updated as needed this visit by clinical staff       Reviewed and updated as needed this visit by Provider         ROS:  Remainder of ROS is negative unless otherwise noted above or in HPI.    This document serves as a record of the services and decisions personally performed and made by Pedro Mcclelland MD. It was created on his behalf by Rm Gillis, trained medical scribe. The creation of this document is based on the provider's statements to the medical scribe.  Rm Gillis 2:29 PM December 12, 2018  OBJECTIVE:     /60   Pulse 66   Temp 97.3  F (36.3  C) (Oral)   Resp 16   Ht 1.562 m (5' 1.5\")   Wt 93.6 kg (206 lb 4.8 oz)   SpO2 97%   BMI 38.35 kg/m    Body mass index is 38.35 kg/m .  GENERAL: healthy, alert and no distress  PSYCH: mentation appears normal, affect normal/bright  CHEST: lungs clear, heart regular   EXTREM: slight pitting, lots of nonpitting edema.    Diagnostic Test Results:  none     ASSESSMENT/PLAN:   (M25.562,  G89.29) Chronic pain of left knee  (primary encounter diagnosis)  Comment: Referred to orthopedics.  Plan: ORTHOPEDICS ADULT REFERRAL        Follow up with referral.    (I10) Essential hypertension with goal blood pressure less than 140/90  Comment: <140/90 at goal.  Plan: triamterene-HCTZ (MAXZIDE-25) 37.5-25 MG tablet        Try half a tab of Maxzide-25. Instructed patient to " contact clinic on the phone if it is not helping get rid of retained fluid.    (R53.83) Tired  Comment: age related?   Plan: check labs.    Patient Instructions   Try half a tab of Maxzide-25. Contact me on the phone if it is not helping get rid of retained fluid.      The information in this document, created by the medical scribe for me, accurately reflects the services I personally performed and the decisions made by me. I have reviewed and approved this document for accuracy prior to leaving the patient care area.  December 12, 2018 2:29 PM    Pedro Mcclelland MD  [facility]

## 2018-12-13 DIAGNOSIS — I10 ESSENTIAL HYPERTENSION WITH GOAL BLOOD PRESSURE LESS THAN 140/90: ICD-10-CM

## 2018-12-13 NOTE — TELEPHONE ENCOUNTER
Dr. Mcclelland,    Please review/sign or advise for refill request of losartan (COZAAR) 50 MG tablet    Routing refill request to provider for review/approval because:  Labs out of range:  Creat 1.20 (12/10/2018)    LOV: 12/12/2018    Thank You!  Navya Vickers, RN  Triage Nurse

## 2018-12-13 NOTE — TELEPHONE ENCOUNTER
"Requested Prescriptions   Pending Prescriptions Disp Refills     losartan (COZAAR) 50 MG tablet [Pharmacy Med Name: LOSARTAN POTASSIUM 50 MG TAB]    Last Written Prescription Date:  11-13-17  Last Fill Quantity: 90,  # refills: 3   Last office visit: 12/12/2018 found with prescribing provider:  Dr. Mcclelland   Future Office Visit:     90 tablet 0     Sig: TAKE 1 TABLET (50 MG) BY MOUTH DAILY    Angiotensin-II Receptors Failed - 12/13/2018  5:21 AM       Failed - Normal serum creatinine on file in past 12 months    Recent Labs   Lab Test 12/10/18  1055   CR 1.20*            Passed - Blood pressure under 140/90 in past 12 months    BP Readings from Last 3 Encounters:   12/12/18 130/60   12/05/18 118/72   09/24/18 98/62                Passed - Recent (12 mo) or future (30 days) visit within the authorizing provider's specialty    Patient had office visit in the last 12 months or has a visit in the next 30 days with authorizing provider or within the authorizing provider's specialty.  See \"Patient Info\" tab in inbasket, or \"Choose Columns\" in Meds & Orders section of the refill encounter.             Passed - Patient is age 18 or older       Passed - No active pregnancy on record       Passed - Normal serum potassium on file in past 12 months    Recent Labs   Lab Test 12/10/18  1055   POTASSIUM 4.4                   Passed - No positive pregnancy test in past 12 months          "

## 2018-12-14 RX ORDER — LOSARTAN POTASSIUM 50 MG/1
50 TABLET ORAL DAILY
Qty: 90 TABLET | Refills: 3 | Status: SHIPPED | OUTPATIENT
Start: 2018-12-14 | End: 2019-04-29

## 2018-12-15 DIAGNOSIS — R89.9 ABNORMAL LABORATORY TEST: Primary | ICD-10-CM

## 2018-12-16 PROBLEM — R53.83 TIRED: Status: ACTIVE | Noted: 2018-12-16

## 2018-12-17 ENCOUNTER — TELEPHONE (OUTPATIENT)
Dept: FAMILY MEDICINE | Facility: CLINIC | Age: 73
End: 2018-12-17

## 2018-12-17 NOTE — TELEPHONE ENCOUNTER
Dr. Mcclelland,     Spoke with patient regarding lab results. Patient states that she has gained 3 pounds in one week.     Per chart review, patient seen 12/12/2018 and told to take 1/2 tab of triamterene-HCTZ (MAXZIDE-25) 37.5-25 MG tablet every other day. Patient is having difficulty splitting the pills.     Patient is wondering if it would be possible to take one full pill on Monday and one full pill of Friday    Please advise    Thank You!  Navya Vickers, TORRES  Triage Nurse

## 2018-12-18 NOTE — TELEPHONE ENCOUNTER
Called patient. Notified of provider message. Pt verbalized understanding and stated that she will call the clinic with any questions or concerns.    Jessica Jones RN  [facility]

## 2018-12-27 ENCOUNTER — TELEPHONE (OUTPATIENT)
Dept: FAMILY MEDICINE | Facility: CLINIC | Age: 73
End: 2018-12-27

## 2018-12-27 DIAGNOSIS — M54.31 SCIATICA OF RIGHT SIDE: Primary | ICD-10-CM

## 2018-12-27 NOTE — TELEPHONE ENCOUNTER
Dr. Mcclelland,    Spoke with patient. Patient is wondering is she can go back on gabapentin to help with her sciatica    Since discontinuing gabapentin, she has experienced pain 7-8/10 at night.     Last night, patient too 2-300 mg gabapentin pills and felt her pain was relieved.     Patient is requesting to restart gabapentin 1-300 mg tablet in morning and 2-300 mg capsules at night.     Patient states she is willing to try restarting the medication for one month and can follow up, otherwise she will make a follow up appointment in March.     :   - Last dispensed: 10/03/2018 #360/90 days prescribed by Dr. Mcclelland    Patient states she does have prescriptions available at her pharmacy, but she wanted to check with you before she picked up the medication and began taking them    Pharmacy cued, new prescription cued    Please advise    Thank You!  Navya Vickers, TORRES  Triage Nurse

## 2018-12-27 NOTE — TELEPHONE ENCOUNTER
Reason for call:  Other   Patient called regarding (reason for call): call back  Additional comments:   Patient is requesting a call back to discuss taking gabapentin (NEURONTIN) 300 MG capsule again    Patient took two gabapentin last night because she was experiencing possible sciatica.    Phone number to reach patient:  Cell number on file:    Telephone Information:   Mobile [phone]       Best Time:  any    Can we leave a detailed message on this number?  YES

## 2018-12-28 RX ORDER — GABAPENTIN 300 MG/1
CAPSULE ORAL
Qty: 270 CAPSULE | Refills: 3 | Status: SHIPPED | OUTPATIENT
Start: 2018-12-28 | End: 2019-08-12

## 2018-12-28 NOTE — TELEPHONE ENCOUNTER
Called patient. Relayed provider message. Instructed patient to return call to clinic if any questions or concerns.  Patient verbalized understanding.     Navya Vickers RN  Triage Nurse

## 2019-02-01 ENCOUNTER — TELEPHONE (OUTPATIENT)
Dept: FAMILY MEDICINE | Facility: CLINIC | Age: 74
End: 2019-02-01

## 2019-02-01 DIAGNOSIS — B37.31 CANDIDAL VULVOVAGINITIS: ICD-10-CM

## 2019-02-01 DIAGNOSIS — G47.00 INSOMNIA, UNSPECIFIED TYPE: ICD-10-CM

## 2019-02-01 NOTE — TELEPHONE ENCOUNTER
Routing refill request to provider for review/approval because:  Drug not on the [facility] refill protocol      check last fill 12/27/18,    for 20, Dr. Krystin Neal, RN   [facility]

## 2019-02-01 NOTE — TELEPHONE ENCOUNTER
Controlled Substance Refill Request for ZOLPIDEM TARTRATE 10 MG TABLET  Problem List Complete:  No     PROVIDER TO CONSIDER COMPLETION OF PROBLEM LIST AND OVERVIEW/CONTROLLED SUBSTANCE AGREEMENT    Last Written Prescription Date:  09/21/2018  Last Fill Quantity: 20,   # refills: 2        Last Office Visit with [facility] primary care provider: 12/12/2018    Future Office visit:     Controlled substance agreement:   Encounter-Level CSA:    There are no encounter-level csa.     Patient-Level CSA:    There are no patient-level csa.         Last Urine Drug Screen: No results found for: CDAUT, No results found for: COMDAT, No results found for: THC13, PCP13, COC13, MAMP13, OPI13, AMP13, BZO13, TCA13, MTD13, BAR13, OXY13, PPX13, BUP13     Processing:  Fax Rx to [facility] pharmacy     [email or web address]       checked in past 3 months?  No, route to RN

## 2019-02-01 NOTE — TELEPHONE ENCOUNTER
Reason for call:  Medication   If this is a refill request, has the caller requested the refill from the pharmacy already? Yes  Will the patient be using a [facility]? No  Name of the pharmacy and phone number for the current request: [facility] [id number] [place] [facility] [place], [place] - [address] [place] [address] [phone]    Name of the medication requested: triamcinolone (KENALOG) 0.1 % cream    Other request: na    Phone number to reach patient:  Home number on file [phone] (home)    Best Time:  any    Can we leave a detailed message on this number?  YES

## 2019-02-01 NOTE — TELEPHONE ENCOUNTER
Dr. Mcclelland,    Please review/sign or advise for triamcinolone (KENALOG) 0.1 % cream.     Routing because RX prescribed 2016. Called patient. She states that she is mixing it with cream that she buys over the store and uses it for irritation for candidal vulvovaginitis.     Pt is aware that Dr. Mcclelland is out of clinic until 02/06 and states that is okay, she is not out, just running low.    Jessica Jones RN  [facility]

## 2019-02-05 RX ORDER — TRIAMCINOLONE ACETONIDE 1 MG/G
CREAM TOPICAL DAILY PRN
Qty: 30 G | Refills: 1 | Status: SHIPPED | OUTPATIENT
Start: 2019-02-05 | End: 2020-02-20

## 2019-02-06 RX ORDER — ZOLPIDEM TARTRATE 10 MG/1
TABLET ORAL
Qty: 20 TABLET | Refills: 2 | Status: ON HOLD | OUTPATIENT
Start: 2019-02-06 | End: 2019-08-13

## 2019-02-06 NOTE — TELEPHONE ENCOUNTER
Called patient. M notifying that RX was sent.    Jessica Jones RN  [facility]

## 2019-02-06 NOTE — TELEPHONE ENCOUNTER
Spoke with pharm    To irritated skin vaginal area, size of a nettie    Kalpana Neal RN   [facility]

## 2019-02-06 NOTE — TELEPHONE ENCOUNTER
Per [facility]-We received a script for Triamcinolone but it doesn't have directions. Please resend a complete Rx. Thanks!

## 2019-02-18 ENCOUNTER — TELEPHONE (OUTPATIENT)
Dept: FAMILY MEDICINE | Facility: CLINIC | Age: 74
End: 2019-02-18

## 2019-02-18 DIAGNOSIS — L65.9 HAIR LOSS: Primary | ICD-10-CM

## 2019-02-18 NOTE — TELEPHONE ENCOUNTER
Dr. Mcclelland;  Called patient, patient states that for the last couple of months she has been losing hair. Patient states her hair falls out in the shower and when she is running her fingers through her hair.     Patient denies all other thyroid symptoms. Patient states she did make a change to her diet as she and Dr. Mcclelland had discussed at her visit. States she is restricting calorie intake. Patient reports she is eating 600-800 calories daily.     Writer advised office visit to discuss symptoms. Patient has appointment scheduled 03/11/2019    Can patient complete a lab only visit before scheduled appointment so lab results can be discussed at office visit? TSH pended, patient has future order in for CMP    Please review/sign or advise    Thank You!  Navya Vickers, TORRES  Triage Nurse

## 2019-02-18 NOTE — TELEPHONE ENCOUNTER
Called patient. Notified of lab orders signed. Scheduled pt for appt at  lab.    Jessica Jnoes RN  [facility]

## 2019-02-18 NOTE — TELEPHONE ENCOUNTER
Reason for call:  Other   Patient called regarding (reason for call): call back  Additional comments: The patient called and stated that she her hair has been falling out. She has an appointment schedule with Dr. Mcclelland on 3/11 but she has some questions about what may be causing this to happen. She would like a call back to discuss this.  Phone number to reach patient:  Home number on file [phone] (home)    Best Time: Any    Can we leave a detailed message on this number?  YES

## 2019-02-19 DIAGNOSIS — R89.9 ABNORMAL LABORATORY TEST: ICD-10-CM

## 2019-02-19 DIAGNOSIS — L65.9 HAIR LOSS: ICD-10-CM

## 2019-02-19 LAB
ALBUMIN SERPL-MCNC: 3.7 G/DL (ref 3.4–5)
ALP SERPL-CCNC: 75 U/L (ref 40–150)
ALT SERPL W P-5'-P-CCNC: 24 U/L (ref 0–50)
ANION GAP SERPL CALCULATED.3IONS-SCNC: 5 MMOL/L (ref 3–14)
AST SERPL W P-5'-P-CCNC: 21 U/L (ref 0–45)
BILIRUB SERPL-MCNC: 0.5 MG/DL (ref 0.2–1.3)
BUN SERPL-MCNC: 22 MG/DL (ref 7–30)
CALCIUM SERPL-MCNC: 10.3 MG/DL (ref 8.5–10.1)
CHLORIDE SERPL-SCNC: 103 MMOL/L (ref 94–109)
CO2 SERPL-SCNC: 30 MMOL/L (ref 20–32)
CREAT SERPL-MCNC: 0.96 MG/DL (ref 0.52–1.04)
GFR SERPL CREATININE-BSD FRML MDRD: 58 ML/MIN/{1.73_M2}
GLUCOSE SERPL-MCNC: 99 MG/DL (ref 70–99)
POTASSIUM SERPL-SCNC: 4.3 MMOL/L (ref 3.4–5.3)
PROT SERPL-MCNC: 7.6 G/DL (ref 6.8–8.8)
SODIUM SERPL-SCNC: 138 MMOL/L (ref 133–144)
TSH SERPL DL<=0.005 MIU/L-ACNC: 3.01 MU/L (ref 0.4–4)

## 2019-02-19 PROCEDURE — 80053 COMPREHEN METABOLIC PANEL: CPT | Performed by: FAMILY MEDICINE

## 2019-02-19 PROCEDURE — 84443 ASSAY THYROID STIM HORMONE: CPT | Performed by: FAMILY MEDICINE

## 2019-02-19 PROCEDURE — 36415 COLL VENOUS BLD VENIPUNCTURE: CPT | Performed by: FAMILY MEDICINE

## 2019-02-20 NOTE — RESULT ENCOUNTER NOTE
Please notify patient: kidney function has improved, excess calcium is less than before. Continue medication, recheck in 1 year. Contact if questions.    Thanks Pedro

## 2019-02-24 DIAGNOSIS — G89.29 CHRONIC MIDLINE LOW BACK PAIN, WITH SCIATICA PRESENCE UNSPECIFIED: ICD-10-CM

## 2019-02-24 DIAGNOSIS — M54.5 CHRONIC MIDLINE LOW BACK PAIN, WITH SCIATICA PRESENCE UNSPECIFIED: ICD-10-CM

## 2019-02-25 RX ORDER — IBUPROFEN 600 MG/1
600 TABLET, FILM COATED ORAL 2 TIMES DAILY PRN
Qty: 90 TABLET | Refills: 1 | Status: ON HOLD | OUTPATIENT
Start: 2019-02-25 | End: 2019-08-13

## 2019-02-25 NOTE — TELEPHONE ENCOUNTER
Dr. Mcclelland,    Please review/sign or advise for refill of ibuprofen (ADVIL/MOTRIN) 600 MG tablet.     Routing because of pt age.    Jessica Jones RN  [facility]

## 2019-02-25 NOTE — TELEPHONE ENCOUNTER
"Requested Prescriptions   Pending Prescriptions Disp Refills     ibuprofen (ADVIL/MOTRIN) 600 MG tablet [Pharmacy Med Name: IBUPROFEN 600 MG TABLET] 90 tablet 1    Last Written Prescription Date:  03/28/2018  Last Fill Quantity: 90,  # refills: 1   Last office visit: 12/12/2018 with prescribing provider:  12/12/2018   Future Office Visit:   Sig: TAKE 1 TABLET (600 MG) BY MOUTH 2 TIMES DAILY AS NEEDED FOR MODERATE PAIN    NSAID Medications Failed - 2/24/2019 10:19 AM       Failed - Patient is age 6-64 years       Passed - Blood pressure under 140/90 in past 12 months    BP Readings from Last 3 Encounters:   12/12/18 130/60   12/05/18 118/72   09/24/18 98/62                Passed - Normal ALT on file in past 12 months    Recent Labs   Lab Test 02/19/19  1038   ALT 24            Passed - Normal AST on file in past 12 months    Recent Labs   Lab Test 02/19/19  1038   AST 21            Passed - Recent (12 mo) or future (30 days) visit within the authorizing provider's specialty    Patient had office visit in the last 12 months or has a visit in the next 30 days with authorizing provider or within the authorizing provider's specialty.  See \"Patient Info\" tab in inbasket, or \"Choose Columns\" in Meds & Orders section of the refill encounter.             Passed - Normal CBC on file in past 12 months    Recent Labs   Lab Test 03/29/18  1056   WBC 6.6   RBC 4.49   HGB 13.2   HCT 40.8                   Passed - Medication is active on med list       Passed - No active pregnancy on record       Passed - Normal serum creatinine on file in past 12 months    Recent Labs   Lab Test 02/19/19  1038   CR 0.96            Passed - No positive pregnancy test in past 12 months        "

## 2019-03-15 DIAGNOSIS — I10 ESSENTIAL HYPERTENSION WITH GOAL BLOOD PRESSURE LESS THAN 140/90: ICD-10-CM

## 2019-03-15 RX ORDER — TRIAMTERENE/HYDROCHLOROTHIAZID 37.5-25 MG
TABLET ORAL
Qty: 90 TABLET | Refills: 0 | Status: SHIPPED | OUTPATIENT
Start: 2019-03-15 | End: 2019-06-17

## 2019-03-15 NOTE — TELEPHONE ENCOUNTER
"Requested Prescriptions   Pending Prescriptions Disp Refills     triamterene-HCTZ (MAXZIDE-25) 37.5-25 MG tablet [Pharmacy Med Name: TRIAMTERENE-HCTZ 37.5-25 MG TB] 90 tablet 0    Last Written Prescription Date:  12/12/2018  Last Fill Quantity: 45,  # refills: 3   Last office visit: 12/12/2018 with prescribing provider:  12/12/2018   Future Office Visit:   Sig: TAKE 1 TABLET BY MOUTH DAILY    Diuretics (Including Combos) Protocol Passed - 3/15/2019  1:21 AM       Passed - Blood pressure under 140/90 in past 12 months    BP Readings from Last 3 Encounters:   12/12/18 130/60   12/05/18 118/72   09/24/18 98/62                Passed - Recent (12 mo) or future (30 days) visit within the authorizing provider's specialty    Patient had office visit in the last 12 months or has a visit in the next 30 days with authorizing provider or within the authorizing provider's specialty.  See \"Patient Info\" tab in inbasket, or \"Choose Columns\" in Meds & Orders section of the refill encounter.             Passed - Medication is active on med list       Passed - Patient is age 18 or older       Passed - No active pregancy on record       Passed - Normal serum creatinine on file in past 12 months    Recent Labs   Lab Test 02/19/19  1038   CR 0.96             Passed - Normal serum potassium on file in past 12 months    Recent Labs   Lab Test 02/19/19  1038   POTASSIUM 4.3                   Passed - Normal serum sodium on file in past 12 months    Recent Labs   Lab Test 02/19/19  1038                Passed - No positive pregnancy test in past 12 months        "

## 2019-04-10 ENCOUNTER — TELEPHONE (OUTPATIENT)
Dept: FAMILY MEDICINE | Facility: CLINIC | Age: 74
End: 2019-04-10

## 2019-04-10 DIAGNOSIS — M54.31 SCIATICA OF RIGHT SIDE: ICD-10-CM

## 2019-04-10 DIAGNOSIS — G62.9 PERIPHERAL POLYNEUROPATHY: ICD-10-CM

## 2019-04-10 DIAGNOSIS — M25.562 LEFT KNEE PAIN: Primary | ICD-10-CM

## 2019-04-10 DIAGNOSIS — M15.9 OSTEOARTHRITIS OF MULTIPLE JOINTS, UNSPECIFIED OSTEOARTHRITIS TYPE: ICD-10-CM

## 2019-04-10 RX ORDER — GABAPENTIN 300 MG/1
CAPSULE ORAL
Qty: 360 CAPSULE | Refills: 1 | Status: SHIPPED | OUTPATIENT
Start: 2019-04-10 | End: 2019-08-12

## 2019-04-10 NOTE — TELEPHONE ENCOUNTER
Dr. Mcclelland    See pt request for scooter    DME cued    Kalpana Neal, TORRES   [facility]

## 2019-04-10 NOTE — TELEPHONE ENCOUNTER
Reason for call:  Other   Patient called regarding (reason for call): Patient request    Additional comments: The patient called and stated that she would like Dr. Mcclelland to write her a prescription for a scooter that can be sent to medicare. She would like a call back if there are any questions or concerns.     Phone number to reach patient:  Home number on file [phone] (home)    Best Time: Any    Can we leave a detailed message on this number?  YES

## 2019-04-11 NOTE — TELEPHONE ENCOUNTER
Dr. Mcclelland,  Patient called clinic. Patient states she has chronic issues sciatic nerve pain and leg pain. Patient states it makes it difficult to walk. Patient is going on vacation for 2 months to [place] and would like a motorized scooter so she can move around easier as she has pain when she is walking around    Patient is wondering if Medicare would cover scooter. Advised patient to call medical supply companies and determine where she could get scooter and coverage options. Patient verbalized understanding    Patient is leaving in early May    DME order cued    Please review/sign or advise    Thank You!  Navya Vickers, RN  Triage Nurse

## 2019-04-29 ENCOUNTER — OFFICE VISIT (OUTPATIENT)
Dept: FAMILY MEDICINE | Facility: CLINIC | Age: 74
End: 2019-04-29
Payer: MEDICARE

## 2019-04-29 VITALS
SYSTOLIC BLOOD PRESSURE: 138 MMHG | WEIGHT: 197.8 LBS | OXYGEN SATURATION: 96 % | HEART RATE: 70 BPM | HEIGHT: 60 IN | TEMPERATURE: 97.8 F | BODY MASS INDEX: 38.83 KG/M2 | DIASTOLIC BLOOD PRESSURE: 82 MMHG

## 2019-04-29 DIAGNOSIS — L65.9 HAIR LOSS: ICD-10-CM

## 2019-04-29 DIAGNOSIS — Z00.00 MEDICARE ANNUAL WELLNESS VISIT, SUBSEQUENT: Primary | ICD-10-CM

## 2019-04-29 DIAGNOSIS — I10 ESSENTIAL HYPERTENSION WITH GOAL BLOOD PRESSURE LESS THAN 140/90: ICD-10-CM

## 2019-04-29 DIAGNOSIS — G47.00 INSOMNIA, UNSPECIFIED TYPE: ICD-10-CM

## 2019-04-29 DIAGNOSIS — M17.0 OSTEOARTHRITIS OF BOTH KNEES, UNSPECIFIED OSTEOARTHRITIS TYPE: ICD-10-CM

## 2019-04-29 PROCEDURE — G0439 PPPS, SUBSEQ VISIT: HCPCS | Performed by: FAMILY MEDICINE

## 2019-04-29 RX ORDER — LOSARTAN POTASSIUM 50 MG/1
50 TABLET ORAL DAILY
Qty: 135 TABLET | Refills: 3 | Status: SHIPPED | OUTPATIENT
Start: 2019-04-29 | End: 2019-04-29

## 2019-04-29 RX ORDER — ZOLPIDEM TARTRATE 5 MG/1
5 TABLET ORAL
Qty: 30 TABLET | Refills: 2 | Status: SHIPPED | OUTPATIENT
Start: 2019-04-29 | End: 2019-07-13

## 2019-04-29 ASSESSMENT — MIFFLIN-ST. JEOR: SCORE: 1309.33

## 2019-04-29 NOTE — PROGRESS NOTES
"  SUBJECTIVE:   Kyler Whitlock is a 74 year old female who presents for Preventive Visit.  Are you in the first 12 months of your Medicare Part B coverage?  No    Physical Health:    In general, how would you rate your overall physical health? good    Outside of work, how many days during the week do you exercise? none    Outside of work, approximately how many minutes a day do you exercise?not applicable    If you drink alcohol do you typically have >3 drinks per day or >7 drinks per week? No    Do you usually eat at least 4 servings of fruit and vegetables a day, include whole grains & fiber and avoid regularly eating high fat or \"junk\" foods? Yes    Do you have any problems taking medications regularly?  No    Do you have any side effects from medications? not applicable    Needs assistance for the following daily activities: no assistance needed    Which of the following safety concerns are present in your home?  none identified     Hearing impairment: Yes, a little     In the past 6 months, have you been bothered by leaking of urine? no    Mental Health:    In general, how would you rate your overall mental or emotional health? good  PHQ-2 Score:         Constitutional  Patient has lost a significant amount of weight in the last year. She expresses that her restricted calorie intake has caused much of her hair to fall out.    Musculoskeletal  Patient reports pain in her knees. She has an upcoming trip to [place], wants to make sure everything is OK before she leaves. She has been taking Ibuprofen to help with the pain. She is scheduled to receive injections in her knees and hip in one week. She is planning on having her right hip replaced in the fall. She denies having any falls recently.    Neuro  She expresses that her she will have paresthesia in her hands when talking on the phone or when she is sleeping.      Do you feel safe in your environment? Yes    Do you have a Health Care Directive? "     Additional concerns to address?  Hair problems, rash on back of neck, bump in wrist, want to get sleeping pills refill for 30.     Fall risk:  Fallen 2 or more times in the past year?: No  Any fall with injury in the past year?: No  click delete button to remove this line now  Cognitive Screenin) Repeat 3 items (Leader, Season, Table)    2) Clock draw: NORMAL  3) 3 item recall: Recalls 3 objects  Results: 3 items recalled: COGNITIVE IMPAIRMENT LESS LIKELY    Mini-CogTM Copyright FRANCOISE Quintero. Licensed by the author for use in [facility]; reprinted with permission (oliver@[email or web address]). All rights reserved.      Do you have sleep apnea, excessive snoring or daytime drowsiness?: yes      Reviewed and updated as needed this visit by clinical staff  Tobacco  Allergies  Meds  Med Hx  Surg Hx  Fam Hx  Soc Hx        Reviewed and updated as needed this visit by Provider        Social History     Tobacco Use     Smoking status: Never Smoker     Smokeless tobacco: Never Used   Substance Use Topics     Alcohol use: No                           Current providers sharing in care for this patient include:   Patient Care Team:  Pedro Mcclelland MD as PCP - General (Family Practice)  Pedro Mcclelland MD as Assigned PCP    The following health maintenance items are reviewed in Epic and correct as of today:  Health Maintenance   Topic Date Due     ZOSTER IMMUNIZATION (1 of 2) 1993     ADVANCE DIRECTIVE PLANNING Q5 YRS  1998     DEXA SCAN SCREENING (SYSTEM ASSIGNED)  2008     FALL RISK ASSESSMENT  2018     MEDICARE ANNUAL WELLNESS VISIT  2018     MARIOLA QUESTIONNAIRE 1 YEAR  2019     PHQ-9 Q1YR  2019     DTAP/TDAP/TD IMMUNIZATION (2 - Td) 2021     LIPID SCREEN Q5 YR FEMALE (SYSTEM ASSIGNED)  12/10/2023     INFLUENZA VACCINE  Completed     PNEUMOCOCCAL IMMUNIZATION 65+ LOW/MEDIUM RISK  Completed     IPV IMMUNIZATION  Aged Out     MENINGITIS IMMUNIZATION  Aged Out      Labs reviewed in EPIC  BP Readings from Last 3 Encounters:   04/29/19 138/82   12/12/18 130/60   12/05/18 118/72    Wt Readings from Last 3 Encounters:   04/29/19 89.7 kg (197 lb 12.8 oz)   12/12/18 93.6 kg (206 lb 4.8 oz)   12/05/18 92.1 kg (203 lb)                  Patient Active Problem List   Diagnosis     Esophageal reflux     Left knee pain     Hyperlipidemia LDL goal <130     Nonspecific abnormal electrocardiogram (ECG) (EKG)     Acute stress reaction     Family history of thyroid disease     Sciatica     HL (hearing loss)     Decreased hearing, bilateral     Gastroesophageal reflux disease, esophagitis presence not specified     Osteoarthritis of multiple joints, unspecified osteoarthritis type     Insomnia, unspecified type     Essential hypertension with goal blood pressure less than 140/90     BMI 40.0-44.9, adult (H)     Peripheral polyneuropathy     Tired     Past Surgical History:   Procedure Laterality Date     CHOLECYSTECTOMY       HERNIORRHAPHY VENTRAL  4/12/2012    Procedure:HERNIORRHAPHY VENTRAL; ventral hernia repair with mesh; Surgeon:ELOISA INMAN; [facility]     HYSTERECTOMY  5/24/01    uterine prolapse/ant. repair       Social History     Tobacco Use     Smoking status: Never Smoker     Smokeless tobacco: Never Used   Substance Use Topics     Alcohol use: No     Family History   Problem Relation Age of Onset     Blood Disease Mother      Depression Mother      Psychotic Disorder Mother      Thyroid Disease Mother      Heart Disease Father      C.A.D. Brother      Hypertension Brother      Cerebrovascular Disease Sister      Hypertension Sister      Thyroid Disease Sister      Hypertension Son      C.A.D. Brother      Hypertension Brother      Hypertension Son      Hypertension Son      Circulatory Son      Thyroid Disease Son          Current Outpatient Medications   Medication Sig Dispense Refill     allopurinol (ZYLOPRIM) 100 MG tablet TAKE 1 TABLET (100 MG) BY MOUTH DAILY 90  tablet 3     Ascorbic Acid (C 500 PO) Take  by mouth daily.       Cholecalciferol (D 1000 PO) Take  by mouth daily.       cyclobenzaprine (FLEXERIL) 10 MG tablet TAKE 1/2 TO 1 TABLET (5-10 MG) BY MOUTH 3 TIMES DAILY AS NEEDED FOR MUSCLE SPASMS 30 tablet 2     FLUZONE HIGH-DOSE injection   0     gabapentin (NEURONTIN) 300 MG capsule TAKE 1 CAPSULE (300 MG) BY MOUTH 4 TIMES DAILY AS NEEDED 360 capsule 1     gabapentin (NEURONTIN) 300 MG capsule Take 1 capsule (300 mg) in morning and 2 capsules (600 mg) at bedtime 270 capsule 3     gabapentin (NEURONTIN) 300 MG capsule Take twice daily x 1 week, then take once daily x 1 week, then stop and take as needed. 30 capsule 1     GLUCOSAMINE PO Take  by mouth daily.       ibuprofen (ADVIL/MOTRIN) 600 MG tablet TAKE 1 TABLET (600 MG) BY MOUTH 2 TIMES DAILY AS NEEDED FOR MODERATE PAIN 90 tablet 1     losartan (COZAAR) 50 MG tablet TAKE 1 TABLET (50 MG) BY MOUTH DAILY 90 tablet 3     MAGNESIUM ASPARTATE PO Take  by mouth.       metoprolol succinate (TOPROL-XL) 50 MG 24 hr tablet Take 1 tablet (50 mg) by mouth daily 90 tablet 3     nystatin (MYCOSTATIN) 672352 UNIT/GM POWD Apply 1 g topically 3 times daily as needed 60 g 1     Omega-3 Fatty Acids (FISH OIL PO) Take  by mouth daily.       phentermine 15 MG capsule Take 1 capsule (15 mg) by mouth every morning 30 capsule 2     triamcinolone (KENALOG) 0.1 % external cream Apply topically daily as needed for irritation 30 g 1     triamterene-HCTZ (MAXZIDE-25) 37.5-25 MG tablet TAKE 1 TABLET BY MOUTH DAILY 90 tablet 0     vitamin  B complex with vitamin C (VITAMIN  B COMPLEX) TABS Take 1 tablet by mouth daily       zolpidem (AMBIEN) 10 MG tablet TAKE 1/2 TABLET BY MOUTH ONCE NIGHTLY AS NEEDED FOR SLEEP. 20 tablet 2     zolpidem (AMBIEN) 5 MG tablet Take 1 tablet (5 mg) by mouth nightly as needed for sleep 30 tablet 0     No Known Allergies      ROS:  Denies chest pain, shortness of breath, heartburn, bowel issues. Remainder of ROS is  "negative unless otherwise noted above or in HPI.    This document serves as a record of the services and decisions personally performed and made by Pedro Mcclelland MD. It was created on his behalf by Rm Gillis, trained medical scribe. The creation of this document is based on the provider's statements to the medical scribe.  Rm Gillis 9:00 AM April 29, 2019    OBJECTIVE:   /82   Pulse 70   Temp 97.8  F (36.6  C) (Oral)   Ht 1.525 m (5' 0.04\")   Wt 89.7 kg (197 lb 12.8 oz)   SpO2 96%   BMI 38.58 kg/m   Estimated body mass index is 38.58 kg/m  as calculated from the following:    Height as of this encounter: 1.525 m (5' 0.04\").    Weight as of this encounter: 89.7 kg (197 lb 12.8 oz).  EXAM:   GENERAL APPEARANCE: healthy, alert and no distress  EYES: Eyes grossly normal to inspection, PERRL and conjunctivae and sclerae normal  HENT: ear canals and TM's normal, nose and mouth without ulcers or lesions, oropharynx clear and oral mucous membranes moist  NECK: no adenopathy, no asymmetry, masses, or scars and thyroid normal to palpation, no bruits  RESP: lungs clear to auscultation - no rales, rhonchi or wheezes  CV: regular rate and rhythm, normal S1 S2, no S3 or S4, no murmur, click or rub, no peripheral edema and peripheral pulses strong  ABDOMEN: soft, nontender, no hepatosplenomegaly, no masses and bowel sounds normal  MS: hypertrophic osteoarthritis changes both knees, no other musculoskeletal defects are noted and gait is age appropriate without ataxia, fingertip sized cystic lump on the right wrist  SKIN: no suspicious lesions or rashes, thinning of the hair over the occiput, the crown of the head, and temple  NEURO: Normal strength and tone, sensory exam grossly normal, mentation intact and speech normal, mild tremor  PSYCH: mentation appears normal and affect normal/bright    Diagnostic Test Results:  No results found for this or any previous visit (from the past 24 hour(s)).    ASSESSMENT / " PLAN:   (Z00.00) Medicare annual wellness visit, subsequent  (primary encounter diagnosis)  Comment: Patient is doing well. Routine physical completed.  Plan: Follow up as needed.    (M17.0) Osteoarthritis of both knees, unspecified osteoarthritis type  Comment: L>R  Plan: OFFICE/OUTPT VISIT,[facility] ABARCA III        OK for travel in son's RV to [place] for 2 months. Pace self    (L65.9) Hair loss  Comment: Possibly due to weight loss and calorie restrictions.  Plan: Monitoring. Follow up as needed.    (I10) Essential hypertension with goal blood pressure less than 140/90  Comment: <140/90 at goal.  Plan: losartan (COZAAR) 50 MG tablet        Continue taking medication. Follow up as needed.    (Z68.41) BMI 40.0-44.9, adult (H)  Comment: up slightly after good drop  Plan: Monitoring. Follow up as needed.    (G47.00) Insomnia, unspecified type  Comment: Stable. Controlled by current medication.  Plan: zolpidem (AMBIEN) 5 MG tablet        Continue taking medication. Follow up as needed.      Patient Instructions       Preventive Health Recommendations    See your health care provider every year to    Review health changes.     Discuss preventive care.      Review your medicines if your doctor has prescribed any.      You no longer need a yearly Pap test unless you've had an abnormal Pap test in the past 10 years. If you have vaginal symptoms, such as bleeding or discharge, be sure to talk with your provider about a Pap test.      Every 1 to 2 years, have a mammogram.  If you are over 69, talk with your health care provider about whether or not you want to continue having screening mammograms.      Every 10 years, have a colonoscopy. Or, have a yearly FIT test (stool test). These exams will check for colon cancer.       Have a cholesterol test every 5 years, or more often if your doctor advises it.       Have a diabetes test (fasting glucose) every three years. If you are at risk for diabetes, you should have this test more often.        At age 65, have a bone density scan (DEXA) to check for osteoporosis (brittle bone disease).    Shots:    Get a flu shot each year.    Get a tetanus shot every 10 years.    Talk to your doctor about your pneumonia vaccines. There are now two you should receive - Pneumovax (PPSV 23) and Prevnar (PCV 13).    Talk to your pharmacist about the shingles vaccine.    Talk to your doctor about the hepatitis B vaccine.    Nutrition:     Eat at least 5 servings of fruits and vegetables each day.      Eat whole-grain bread, whole-wheat pasta and brown rice instead of white grains and rice.      Get adequate about Calcium and Vitamin D.     Lifestyle    Exercise at least 150 minutes a week (30 minutes a day, 5 days a week). This will help you control your weight and prevent disease.      Limit alcohol to one drink per day.      No smoking.       Wear sunscreen to prevent skin cancer.       See your dentist twice a year for an exam and cleaning.      See your eye doctor every 1 to 2 years to screen for conditions such as glaucoma, macular degeneration, cataracts, etc.    Personalized Prevention Plan  You are due for the preventive services outlined below.  Your care team is available to assist you in scheduling these services.  If you have already completed any of these items, please share that information with your care team to update in your medical record.    Health Maintenance Due   Topic Date Due     Zoster (Shingles) Vaccine (1 of 2) 03/08/1993     Discuss Advance Directive Planning  03/08/1998     Bone Density Screening (Dexa)  03/08/2008     FALL RISK ASSESSMENT  09/08/2018     Annual Wellness Visit  11/13/2018         End of Life Planning:  Patient currently has an advanced directive: Not discussed.    COUNSELING:  Reviewed preventive health counseling, as reflected in patient instructions       Regular exercise    BP Readings from Last 1 Encounters:   04/29/19 138/82     Estimated body mass index is 38.58 kg/m   "as calculated from the following:    Height as of this encounter: 1.525 m (5' 0.04\").    Weight as of this encounter: 89.7 kg (197 lb 12.8 oz).    Weight management plan: Discussed healthy diet and exercise guidelines     reports that she has never smoked. She has never used smokeless tobacco.      Appropriate preventive services were discussed with this patient, including applicable screening as appropriate for cardiovascular disease, diabetes, osteopenia/osteoporosis, and glaucoma.  As appropriate for age/gender, discussed screening for colorectal cancer, prostate cancer, breast cancer, and cervical cancer. Checklist reviewing preventive services available has been given to the patient.    Reviewed patients plan of care and provided an AVS. The Intermediate Care Plan ( asthma action plan, low back pain action plan, and migraine action plan) for Kyler meets the Care Plan requirement. This Care Plan has been established and reviewed with the Patient.    The information in this document, created by the medical scribe for me, accurately reflects the services I personally performed and the decisions made by me. I have reviewed and approved this document for accuracy prior to leaving the patient care area.  April 29, 2019 9:00 AM    Pedro Mcclelland MD  [facility] [place]  "

## 2019-04-29 NOTE — TELEPHONE ENCOUNTER
Dr. Mcclelland,  Pharmacy requesting clarification, sig of medication sent today unclear    Writer cued new prescription for losartan (COZAAR) 50 MG tablet 1.5 tablets daily    Please review/sign or advise    Thank You!  Navya Vickers RN  Triage Nurse

## 2019-04-29 NOTE — TELEPHONE ENCOUNTER
"Requested Prescriptions   Pending Prescriptions Disp Refills     losartan (COZAAR) 50 MG tablet  Last Written Prescription Date:  4/29/19  Last Fill Quantity: 26,  # refills: 3   Last office visit: No previous visit found with prescribing provider:  4/29/19   Future Office Visit:     135 tablet 3     Sig: Take 1 tablet (50 mg) by mouth daily 1.5 tabs one daily       Angiotensin-II Receptors Passed - 4/29/2019  1:39 PM        Passed - Blood pressure under 140/90 in past 12 months     BP Readings from Last 3 Encounters:   04/29/19 138/82   12/12/18 130/60   12/05/18 118/72                 Passed - Recent (12 mo) or future (30 days) visit within the authorizing provider's specialty     Patient had office visit in the last 12 months or has a visit in the next 30 days with authorizing provider or within the authorizing provider's specialty.  See \"Patient Info\" tab in inbasket, or \"Choose Columns\" in Meds & Orders section of the refill encounter.              Passed - Medication is active on med list        Passed - Patient is age 18 or older        Passed - No active pregnancy on record        Passed - Normal serum creatinine on file in past 12 months     Recent Labs   Lab Test 02/19/19  1038   CR 0.96             Passed - Normal serum potassium on file in past 12 months     Recent Labs   Lab Test 02/19/19  1038   POTASSIUM 4.3                    Passed - No positive pregnancy test in past 12 months        "

## 2019-04-29 NOTE — PATIENT INSTRUCTIONS
Preventive Health Recommendations    See your health care provider every year to    Review health changes.     Discuss preventive care.      Review your medicines if your doctor has prescribed any.      You no longer need a yearly Pap test unless you've had an abnormal Pap test in the past 10 years. If you have vaginal symptoms, such as bleeding or discharge, be sure to talk with your provider about a Pap test.      Every 1 to 2 years, have a mammogram.  If you are over 69, talk with your health care provider about whether or not you want to continue having screening mammograms.      Every 10 years, have a colonoscopy. Or, have a yearly FIT test (stool test). These exams will check for colon cancer.       Have a cholesterol test every 5 years, or more often if your doctor advises it.       Have a diabetes test (fasting glucose) every three years. If you are at risk for diabetes, you should have this test more often.       At age 65, have a bone density scan (DEXA) to check for osteoporosis (brittle bone disease).    Shots:    Get a flu shot each year.    Get a tetanus shot every 10 years.    Talk to your doctor about your pneumonia vaccines. There are now two you should receive - Pneumovax (PPSV 23) and Prevnar (PCV 13).    Talk to your pharmacist about the shingles vaccine.    Talk to your doctor about the hepatitis B vaccine.    Nutrition:     Eat at least 5 servings of fruits and vegetables each day.      Eat whole-grain bread, whole-wheat pasta and brown rice instead of white grains and rice.      Get adequate about Calcium and Vitamin D.     Lifestyle    Exercise at least 150 minutes a week (30 minutes a day, 5 days a week). This will help you control your weight and prevent disease.      Limit alcohol to one drink per day.      No smoking.       Wear sunscreen to prevent skin cancer.       See your dentist twice a year for an exam and cleaning.      See your eye doctor every 1 to 2 years to screen for  conditions such as glaucoma, macular degeneration, cataracts, etc.    Personalized Prevention Plan  You are due for the preventive services outlined below.  Your care team is available to assist you in scheduling these services.  If you have already completed any of these items, please share that information with your care team to update in your medical record.    Health Maintenance Due   Topic Date Due     Zoster (Shingles) Vaccine (1 of 2) 03/08/1993     Discuss Advance Directive Planning  03/08/1998     Bone Density Screening (Dexa)  03/08/2008     FALL RISK ASSESSMENT  09/08/2018     Annual Wellness Visit  11/13/2018

## 2019-04-30 ENCOUNTER — TELEPHONE (OUTPATIENT)
Dept: FAMILY MEDICINE | Facility: CLINIC | Age: 74
End: 2019-04-30

## 2019-04-30 DIAGNOSIS — I10 ESSENTIAL HYPERTENSION WITH GOAL BLOOD PRESSURE LESS THAN 140/90: ICD-10-CM

## 2019-04-30 RX ORDER — LOSARTAN POTASSIUM 50 MG/1
75 TABLET ORAL DAILY
Qty: 135 TABLET | Refills: 3 | Status: SHIPPED | OUTPATIENT
Start: 2019-04-30 | End: 2019-05-01

## 2019-04-30 RX ORDER — METOPROLOL SUCCINATE 50 MG/1
TABLET, EXTENDED RELEASE ORAL
Qty: 90 TABLET | Refills: 3 | OUTPATIENT
Start: 2019-04-30

## 2019-04-30 NOTE — TELEPHONE ENCOUNTER
Dr. Mcclelland,    Pt wanted to let you know that yesterday at the OV, she told you that she used to take 2 of the losartan and this in incorrect, she actually used to take 2 of the metoprolol (total 100 mg) in the past.     She wants to clarify if you still want her to do the increased 1.5 tabs of losartan or if you want her to increase metoprolol instead and go back to 1 tab of losartan.     Also, of note, metoprolol is easier to split in half.     Please advise.    Jessica Jones RN  [facility]

## 2019-04-30 NOTE — TELEPHONE ENCOUNTER
Reason for call:  Other   Patient called regarding (reason for call): call back  Additional comments:   Patient would like to clarify which medication she discussed increasing with Dr. Mcclelland at an OV on 4/29/19.    Phone number to reach patient:  Home number on file [phone] (home)    Best Time:  any    Can we leave a detailed message on this number?  YES

## 2019-04-30 NOTE — TELEPHONE ENCOUNTER
Dr. Mcclelland    Pt wanted to clarify as she thinks she was talking about the metoprolol she use to take 100mg BID  And she was wondering if you were thinking on the losartan, when you were discussing increasing a BP med  you increased the losartan to 50mg take 1.5 tabs daily    Metoprolol 50mg daily, is what she has been taking for some time and   Losartan 50mg     Advise    Kalpana Neal RN   [facility]

## 2019-05-01 RX ORDER — LOSARTAN POTASSIUM 50 MG/1
50 TABLET ORAL DAILY
Qty: 90 TABLET | Refills: 3 | Status: SHIPPED | OUTPATIENT
Start: 2019-05-01 | End: 2020-07-06

## 2019-05-01 RX ORDER — METOPROLOL SUCCINATE 50 MG/1
50 TABLET, EXTENDED RELEASE ORAL
Qty: 180 TABLET | Refills: 3 | Status: SHIPPED | OUTPATIENT
Start: 2019-05-01 | End: 2020-07-10

## 2019-05-01 NOTE — TELEPHONE ENCOUNTER
Got it; orders changed- please review with patient. Metoprolol XL 50 mg twice daily (not 100 mg) and losartan 50 mg once daily. Let us know if further questions or problems. Thanks Pedro

## 2019-05-01 NOTE — TELEPHONE ENCOUNTER
Called patient. Notified her of provider message. Pt verbalized understanding.     Jessica Jones RN  [facility]

## 2019-05-01 NOTE — TELEPHONE ENCOUNTER
See TE from 04/30/19. Pt was notified of new prescriptions sent into pharmacy and new medication dosages.    Jessica Jones RN  [facility]

## 2019-05-10 ENCOUNTER — TELEPHONE (OUTPATIENT)
Dept: FAMILY MEDICINE | Facility: CLINIC | Age: 74
End: 2019-05-10

## 2019-05-10 NOTE — TELEPHONE ENCOUNTER
Called pharmacy, verbal order given for zolpidem (AMBIEN) 5 MG tablet #60/0 refills to last patient while on her trip    Called patient, left voicemail to return call to clinic    Per : patient dispensed zolpidem (AMBIEN) 10 MG tablet on 04/29/2019 (40 day supply). How has patient been taking medication? As of right now, patient has approximately a 100 day supply of medication.    Navya Vickers RN  Triage Nurse

## 2019-05-10 NOTE — TELEPHONE ENCOUNTER
Dr. Mcclelland,  Please see phone message below.    Can we call pharmacy and give verbal ok to dispense 60 tablets?    : 04/29/2019 #20/40 day supply prescribed by Dr. Mcclelland    Please advise     Thank You!  Navya Vickers, TORRES  Triage Nurse

## 2019-05-10 NOTE — TELEPHONE ENCOUNTER
Reason for call:  Other   Patient called regarding (reason for call): call back  Additional comments: The patient called and stated that she will be going to [place] for two months, which Dr. Mcclelland is aware of. He wrote her a prescription for zolpidem (AMBIEN) 5 MG tablet but only for 30 tablets. She is wondering if he would be able to write a new prescription for 60 tablets so she can get them filled all at once and doesn't have to worry about filling it while she is in [place]. She would like a call back to discuss if this would be possible or not.       Phone number to reach patient:  Home number on file [phone] (home)    Best Time: She will be leaving in an hour and a half so ideally before then    Can we leave a detailed message on this number?  YES

## 2019-05-14 NOTE — TELEPHONE ENCOUNTER
Called patient. LVM to call clinic and ask to speak to a nurse.    Jessica Jones RN  [facility]

## 2019-05-15 NOTE — TELEPHONE ENCOUNTER
Patient called clinic and states that she has already picked up medication after getting a call from the pharmacy.    Jessica Jones RN  [facility]

## 2019-06-17 DIAGNOSIS — I10 ESSENTIAL HYPERTENSION WITH GOAL BLOOD PRESSURE LESS THAN 140/90: ICD-10-CM

## 2019-06-17 RX ORDER — TRIAMTERENE/HYDROCHLOROTHIAZID 37.5-25 MG
TABLET ORAL
Qty: 90 TABLET | Refills: 1 | Status: SHIPPED | OUTPATIENT
Start: 2019-06-17 | End: 2019-12-06

## 2019-06-17 NOTE — TELEPHONE ENCOUNTER
Prescription approved per [facility] Refill Protocol.  LOV: 04/29/2019  Labs: up to date    Navya Vickers, RN  Triage Nurse

## 2019-06-17 NOTE — TELEPHONE ENCOUNTER
"Requested Prescriptions   Pending Prescriptions Disp Refills     triamterene-HCTZ (MAXZIDE-25) 37.5-25 MG tablet [Pharmacy Med Name: TRIAMTERENE-HCTZ 37.5-25 MG TB] 90 tablet 0     Sig: TAKE 1 TABLET BY MOUTH DAILY  Last Written Prescription Date:  03/15/219  Last Fill Quantity: 90,  # refills: 0   Last office visit: 4/29/2019 with prescribing provider:  04/29/2019   Future Office Visit:         Diuretics (Including Combos) Protocol Passed - 6/17/2019  1:13 AM        Passed - Blood pressure under 140/90 in past 12 months     BP Readings from Last 3 Encounters:   04/29/19 138/82   12/12/18 130/60   12/05/18 118/72                 Passed - Recent (12 mo) or future (30 days) visit within the authorizing provider's specialty     Patient had office visit in the last 12 months or has a visit in the next 30 days with authorizing provider or within the authorizing provider's specialty.  See \"Patient Info\" tab in inbasket, or \"Choose Columns\" in Meds & Orders section of the refill encounter.              Passed - Medication is active on med list        Passed - Patient is age 18 or older        Passed - No active pregancy on record        Passed - Normal serum creatinine on file in past 12 months     Recent Labs   Lab Test 02/19/19  1038   CR 0.96              Passed - Normal serum potassium on file in past 12 months     Recent Labs   Lab Test 02/19/19  1038   POTASSIUM 4.3                    Passed - Normal serum sodium on file in past 12 months     Recent Labs   Lab Test 02/19/19  1038                 Passed - No positive pregnancy test in past 12 months        "

## 2019-07-13 DIAGNOSIS — G47.00 INSOMNIA, UNSPECIFIED TYPE: ICD-10-CM

## 2019-07-15 RX ORDER — ZOLPIDEM TARTRATE 5 MG/1
TABLET ORAL
Qty: 60 TABLET | Refills: 0 | Status: SHIPPED | OUTPATIENT
Start: 2019-07-15 | End: 2019-08-12

## 2019-07-15 RX ORDER — METOPROLOL TARTRATE 100 MG
TABLET ORAL
Qty: 180 TABLET | Refills: 0 | OUTPATIENT
Start: 2019-07-15

## 2019-07-15 NOTE — TELEPHONE ENCOUNTER
Called pharmacy- notified them that metoprolol tartrate (Lopressor) discontinued. They will dispense for Rx on file for metoprolol succinate ER (TOPROL-XL) 50 MG 24 hr tablet 180 tabs, 3 refills.    Jessica Jones RN  [facility]

## 2019-07-15 NOTE — TELEPHONE ENCOUNTER
Controlled Substance Refill Request for ZOLPIDEM TARTRATE 5 MG TABLET  Problem List Complete:  No     PROVIDER TO CONSIDER COMPLETION OF PROBLEM LIST AND OVERVIEW/CONTROLLED SUBSTANCE AGREEMENT    Last Written Prescription Date:  04/29/2019  Last Fill Quantity: 30,   # refills: 2    THE MOST RECENT OFFICE VISIT MUST BE WITHIN THE PAST 3 MONTHS. AT LEAST ONE FACE TO FACE VISIT MUST OCCUR EVERY 6 MONTHS. ADDITIONAL VISITS CAN BE VIRTUAL.  (THIS STATEMENT SHOULD BE DELETED.)    Last Office Visit with [facility] primary care provider: 04/29/2019    Future Office visit:     Controlled substance agreement:   Encounter-Level CSA:    There are no encounter-level csa.     Patient-Level CSA:    There are no patient-level csa.         Last Urine Drug Screen: No results found for: CDAUT, No results found for: COMDAT, No results found for: THC13, PCP13, COC13, MAMP13, OPI13, AMP13, BZO13, TCA13, MTD13, BAR13, OXY13, PPX13, BUP13     Processing:  Fax Rx to [facility]     [email or web address]       checked in past 3 months?  No, route to RN

## 2019-07-15 NOTE — TELEPHONE ENCOUNTER
"Requested Prescriptions   Pending Prescriptions Disp Refills     metoprolol tartrate (LOPRESSOR) 100 MG tablet [Pharmacy Med Name: METOPROLOL TARTR 100MG TAB] 180 tablet 0     Sig: TAKE 1 TABLET (100 MG) BY MOUTH 2 TIMES DAILY  Last Written Prescription Date:  05/01/2019  Last Fill Quantity: 180,  # refills: 3   Last office visit: 4/29/2019 with prescribing provider:  04/29/2019   Future Office Visit:         Beta-Blockers Protocol Failed - 7/14/2019  4:36 PM        Failed - Medication is active on med list        Passed - Blood pressure under 140/90 in past 12 months     BP Readings from Last 3 Encounters:   04/29/19 138/82   12/12/18 130/60   12/05/18 118/72                 Passed - Patient is age 6 or older        Passed - Recent (12 mo) or future (30 days) visit within the authorizing provider's specialty     Patient had office visit in the last 12 months or has a visit in the next 30 days with authorizing provider or within the authorizing provider's specialty.  See \"Patient Info\" tab in inbasket, or \"Choose Columns\" in Meds & Orders section of the refill encounter.              "

## 2019-07-29 ENCOUNTER — HOSPITAL ENCOUNTER (OUTPATIENT)
Dept: LAB | Facility: CLINIC | Age: 74
Discharge: HOME | End: 2019-07-29
Attending: ORTHOPAEDIC SURGERY | Admitting: ORTHOPAEDIC SURGERY
Payer: MEDICARE

## 2019-07-29 DIAGNOSIS — Z01.812 PRE-OPERATIVE LABORATORY EXAMINATION: ICD-10-CM

## 2019-07-29 LAB
MRSA DNA SPEC QL NAA+PROBE: NEGATIVE
SPECIMEN SOURCE: NORMAL

## 2019-07-29 PROCEDURE — 40000830 ZZHCL STATISTIC STAPH AUREUS METH RESIST SCREEN PCR: Performed by: ORTHOPAEDIC SURGERY

## 2019-07-29 PROCEDURE — 87641 MR-STAPH DNA AMP PROBE: CPT | Performed by: ORTHOPAEDIC SURGERY

## 2019-07-29 PROCEDURE — 87640 STAPH A DNA AMP PROBE: CPT | Performed by: ORTHOPAEDIC SURGERY

## 2019-07-29 PROCEDURE — 40000829 ZZHCL STATISTIC STAPH AUREUS SUSCEPT SCREEN PCR: Performed by: ORTHOPAEDIC SURGERY

## 2019-07-30 NOTE — PROGRESS NOTES
MRSA negative-MSSA+. Pt declined Mupirocin. Antisepsis DOS ordered per protocol. Pt instructed per protocol, surgeon notified.

## 2019-08-07 ENCOUNTER — OFFICE VISIT (OUTPATIENT)
Dept: FAMILY MEDICINE | Facility: CLINIC | Age: 74
End: 2019-08-07
Payer: MEDICARE

## 2019-08-07 VITALS
OXYGEN SATURATION: 98 % | WEIGHT: 196 LBS | SYSTOLIC BLOOD PRESSURE: 136 MMHG | HEIGHT: 60 IN | DIASTOLIC BLOOD PRESSURE: 80 MMHG | TEMPERATURE: 97.3 F | BODY MASS INDEX: 38.48 KG/M2 | HEART RATE: 60 BPM

## 2019-08-07 DIAGNOSIS — M16.11 OSTEOARTHRITIS OF RIGHT HIP, UNSPECIFIED OSTEOARTHRITIS TYPE: ICD-10-CM

## 2019-08-07 DIAGNOSIS — G47.00 INSOMNIA, UNSPECIFIED TYPE: ICD-10-CM

## 2019-08-07 DIAGNOSIS — I10 ESSENTIAL HYPERTENSION WITH GOAL BLOOD PRESSURE LESS THAN 140/90: ICD-10-CM

## 2019-08-07 DIAGNOSIS — Z01.818 PREOP GENERAL PHYSICAL EXAM: Primary | ICD-10-CM

## 2019-08-07 DIAGNOSIS — E87.6 HYPOKALEMIA: ICD-10-CM

## 2019-08-07 DIAGNOSIS — R94.31 ABNORMAL ELECTROCARDIOGRAM: ICD-10-CM

## 2019-08-07 PROCEDURE — 93000 ELECTROCARDIOGRAM COMPLETE: CPT | Performed by: FAMILY MEDICINE

## 2019-08-07 PROCEDURE — 36415 COLL VENOUS BLD VENIPUNCTURE: CPT | Performed by: FAMILY MEDICINE

## 2019-08-07 PROCEDURE — 99215 OFFICE O/P EST HI 40 MIN: CPT | Performed by: FAMILY MEDICINE

## 2019-08-07 PROCEDURE — 84132 ASSAY OF SERUM POTASSIUM: CPT | Performed by: FAMILY MEDICINE

## 2019-08-07 ASSESSMENT — MIFFLIN-ST. JEOR: SCORE: 1300.55

## 2019-08-07 NOTE — PROGRESS NOTES
[address] [address]  [phone]  Dept: [phone]    PRE-OP EVALUATION:  Today's date: 2019    Kyler Whitlock (: 1945) presents for pre-operative evaluation assessment as requested by Dr. Castillo.  She requires evaluation and anesthesia risk assessment prior to undergoing surgery/procedure for treatment of RIGHT DIRECT ANTERIOR TOTAL HIP ARTHROPLASTY (ALMA) .    Proposed Surgery/ Procedure: RIGHT DIRECT ANTERIOR TOTAL HIP ARTHROPLASTY (ALMA)  Date of Surgery/ Procedure: 2019  Time of Surgery/ Procedure: Has to check in at 11, believes it is at 1pm  Hospital/Surgical Facility: [facility]  Primary Physician: Pedro Mcclelland  Type of Anesthesia Anticipated: General    Patient has a Health Care Directive or Living Will:  YES, not on file    Does not have a living will but would like a DNR    1. NO - Do you have a history of heart attack, stroke, stent, bypass or surgery on an artery in the head, neck, heart or legs?  2. NO - Do you ever have any pain or discomfort in your chest?  3. NO - Do you have a history of  Heart Failure?  4. NO - Are you troubled by shortness of breath when: walking on the level, up a slight hill or at night?  5. NO - Do you currently have a cold, bronchitis or other respiratory infection?  6. NO - Do you have a cough, shortness of breath or wheezing?  7. NO - Do you sometimes get pains in the calves of your legs when you walk?  8. YES - Do you or anyone in your family have previous history of blood clots? Sister has a history of blood clots and just had a stroke.  9. NO - Do you or does anyone in your family have a serious bleeding problem such as prolonged bleeding following surgeries or cuts?  10. NO - Have you ever had problems with anemia or been told to take iron pills?  11. NO - Have you had any abnormal blood loss such as black, tarry or bloody stools, or abnormal vaginal  bleeding?  12. NO - Have you ever had a blood transfusion?  13. NO - Have you or any of your relatives ever had problems with anesthesia?  14. YES - Do you have sleep apnea, excessive snoring or daytime drowsiness? Snoring   15. NO - Do you have any prosthetic heart valves?  16. NO - Do you have prosthetic joints?  17. NO - Is there any chance that you may be pregnant?      HPI:     HPI related to upcoming procedure: RIGHT DIRECT ANTERIOR TOTAL HIP ARTHROPLASTY      HYPERTENSION - Patient has longstanding history of HTN , currently denies any symptoms referable to elevated blood pressure. Specifically denies chest pain, palpitations, dyspnea, orthopnea, PND or peripheral edema. Blood pressure readings have been in normal range. Current medication regimen is as listed below. Patient denies any side effects of medication.       MEDICAL HISTORY:     Patient Active Problem List    Diagnosis Date Noted     Insomnia, unspecified type 08/01/2016     Priority: High     #20 Rx 4 x yearly       Sciatica 10/09/2014     Priority: High     Right, spondylodesis, DJD  Hydrocodone 5/325 1.5 tabs daily prn #30 OK for 4 Rxs per year.         Acute stress reaction 12/19/2013     Priority: High     Grieving since 's death yrs ago, family stress. OK for lorazepam 0.5 mg prn #4 - filled once 2013       Tired 12/16/2018     Priority: Medium     Peripheral polyneuropathy 04/02/2018     Priority: Medium     BMI 40.0-44.9, adult (H) 11/13/2017     Priority: Medium     Essential hypertension with goal blood pressure less than 140/90 11/11/2016     Priority: Medium     Osteoarthritis of multiple joints, unspecified osteoarthritis type 07/08/2016     Priority: Medium     Decreased hearing, bilateral 06/20/2016     Priority: Medium     Gastroesophageal reflux disease, esophagitis presence not specified 06/20/2016     Priority: Medium     HL (hearing loss) 10/09/2014     Priority: Medium     Hearing aides, bilat       Family history of  thyroid disease 04/25/2014     Priority: Medium     Nonspecific abnormal electrocardiogram (ECG) (EKG) 08/29/2013     Priority: Medium     Hyperlipidemia LDL goal <130 08/14/2013     Priority: Medium     Left knee pain      Priority: Medium      Past Medical History:   Diagnosis Date     BMI 40.0-44.9, adult (H) 11/13/2017     Esophageal reflux     Gastroesophageal reflux     Left knee pain      Nonspecific abnormal electrocardiogram (ECG) (EKG) 8/29/2013     Past Surgical History:   Procedure Laterality Date     CHOLECYSTECTOMY       HERNIORRHAPHY VENTRAL  4/12/2012    Procedure:HERNIORRHAPHY VENTRAL; ventral hernia repair with mesh; Surgeon:ELOISA INMAN; Location:[facility]     HYSTERECTOMY  5/24/01    uterine prolapse/ant. repair     Current Outpatient Medications   Medication Sig Dispense Refill     allopurinol (ZYLOPRIM) 100 MG tablet TAKE 1 TABLET (100 MG) BY MOUTH DAILY 90 tablet 3     Ascorbic Acid (C 500 PO) Take  by mouth daily.       Cholecalciferol (D 1000 PO) Take  by mouth daily.       cyclobenzaprine (FLEXERIL) 10 MG tablet TAKE 1/2 TO 1 TABLET (5-10 MG) BY MOUTH 3 TIMES DAILY AS NEEDED FOR MUSCLE SPASMS 30 tablet 2     FLUZONE HIGH-DOSE injection   0     gabapentin (NEURONTIN) 300 MG capsule TAKE 1 CAPSULE (300 MG) BY MOUTH 4 TIMES DAILY AS NEEDED 360 capsule 1     gabapentin (NEURONTIN) 300 MG capsule Take 1 capsule (300 mg) in morning and 2 capsules (600 mg) at bedtime 270 capsule 3     gabapentin (NEURONTIN) 300 MG capsule Take twice daily x 1 week, then take once daily x 1 week, then stop and take as needed. 30 capsule 1     GLUCOSAMINE PO Take  by mouth daily.       ibuprofen (ADVIL/MOTRIN) 600 MG tablet TAKE 1 TABLET (600 MG) BY MOUTH 2 TIMES DAILY AS NEEDED FOR MODERATE PAIN 90 tablet 1     losartan (COZAAR) 50 MG tablet Take 1 tablet (50 mg) by mouth daily 90 tablet 3     MAGNESIUM ASPARTATE PO Take  by mouth.       metoprolol succinate ER (TOPROL-XL) 50 MG 24 hr tablet Take 1 tablet (50 mg) by  mouth 2 times daily 180 tablet 3     nystatin (MYCOSTATIN) 462070 UNIT/GM POWD Apply 1 g topically 3 times daily as needed 60 g 1     Omega-3 Fatty Acids (FISH OIL PO) Take  by mouth daily.       phentermine 15 MG capsule Take 1 capsule (15 mg) by mouth every morning 30 capsule 2     triamcinolone (KENALOG) 0.1 % external cream Apply topically daily as needed for irritation 30 g 1     triamterene-HCTZ (MAXZIDE-25) 37.5-25 MG tablet TAKE 1 TABLET BY MOUTH DAILY 90 tablet 1     vitamin  B complex with vitamin C (VITAMIN  B COMPLEX) TABS Take 1 tablet by mouth daily       zolpidem (AMBIEN) 10 MG tablet TAKE 1/2 TABLET BY MOUTH ONCE NIGHTLY AS NEEDED FOR SLEEP. 20 tablet 2     zolpidem (AMBIEN) 5 MG tablet TAKE ONE TAB BY MOUTH EVERY NIGHT AT BEDTIME AS NEEDED FOR SLEEP 60 tablet 0     OTC products: None, except as noted above    No Known Allergies   Latex Allergy: NO    Social History     Tobacco Use     Smoking status: Never Smoker     Smokeless tobacco: Never Used   Substance Use Topics     Alcohol use: No     History   Drug Use No       REVIEW OF SYSTEMS:   CONSTITUTIONAL: NEGATIVE for fever, chills, change in weight  INTEGUMENTARY/SKIN: NEGATIVE for worrisome rashes, moles or lesions  EYES: NEGATIVE for vision changes or irritation  ENT/MOUTH: NEGATIVE for ear, mouth and throat problems  RESP: NEGATIVE for significant cough or SOB  BREAST: NEGATIVE for masses, tenderness or discharge  CV: NEGATIVE for chest pain, palpitations or peripheral edema  GI: NEGATIVE for nausea, abdominal pain, heartburn, or change in bowel habits  : NEGATIVE for frequency, dysuria, or hematuria  MUSCULOSKELETAL: NEGATIVE for significant arthralgias or myalgia  NEURO: NEGATIVE for weakness, dizziness or paresthesias  ENDOCRINE: NEGATIVE for temperature intolerance, skin/hair changes  HEME: NEGATIVE for bleeding problems  PSYCHIATRIC: NEGATIVE for changes in mood or affect    This document serves as a record of the services and decisions  personally performed and made by Pedro Mcclelland MD. It was created on his behalf by Rm Gillis and Jessica Adames, trained medical scribes. The creation of this document is based on the provider's statements to the medical scribes.  Rm Adames 2:16 PM August 7, 2019    EXAM:   /80   Pulse 60   Temp 97.3  F (36.3  C) (Oral)   Ht 1.524 m (5')   Wt 88.9 kg (196 lb)   SpO2 98%   BMI 38.28 kg/m      GENERAL APPEARANCE: healthy, alert and no distress     EYES: EOMI, PERRL     HENT: ear canals and TM's normal and nose and mouth without ulcers or lesions     NECK: no adenopathy, no asymmetry, masses, or scars and thyroid normal to palpation, no bruits     RESP: lungs clear to auscultation - no rales, rhonchi or wheezes     CV: regular rates and rhythm, normal S1 S2, no S3 or S4 and no murmur, click or rub     ABDOMEN:  soft, nontender, no HSM or masses and bowel sounds normal     MS: extremities normal- no gross deformities noted, no evidence of inflammation in joints, FROM in all extremities.     SKIN: no suspicious lesions or rashes     NEURO: Normal strength and tone, sensory exam grossly normal, mentation intact and speech normal     PSYCH: mentation appears normal. and affect normal/bright     LYMPHATICS: No cervical adenopathy    DIAGNOSTICS:   EKG: appears normal, NSR, no LVH by voltage criteria, right axis vs partial posterior right bundle branch block  K+: 4.2, then repeated 8/9/19 at 3.3    Recent Labs   Lab Test 02/19/19  1038 12/10/18  1055  03/29/18  1056  11/11/16  1109   HGB  --   --   --  13.2  --  13.3   PLT  --   --   --  299  --  279    136   < >  --    < > 139   POTASSIUM 4.3 4.4   < >  --    < > 4.2   CR 0.96 1.20*   < >  --    < > 0.79    < > = values in this interval not displayed.     IMPRESSION:   Reason for surgery/procedure: DJD of right hip    The proposed surgical procedure is considered INTERMEDIATE risk.    REVISED CARDIAC RISK INDEX  The patient has  the following serious cardiovascular risks for perioperative complications such as (MI, PE, VFib and 3  AV Block):   Low potassium, normal stress echo 8/9/19    INTERPRETATION:     The patient has the following additional risks for perioperative complications:  Morbid obesity      ICD-10-CM    1. Preop general physical exam Z01.818 EKG 12-lead complete w/read - Clinics     Potassium     Echocardiogram Dobutamine Stress   2. Osteoarthritis of right hip, unspecified osteoarthritis type M16.11    3. Abnormal electrocardiogram R94.31 Echocardiogram Dobutamine Stress   4. Essential hypertension with goal blood pressure less than 140/90 I10    5. Insomnia, unspecified type G47.00    6. BMI 40.0-44.9, adult (H) Z68.41        RECOMMENDATIONS:     Oral potassium started    --Patient is to take all scheduled medications on the day of surgery EXCEPT for modifications listed below.    Take your Metoprolol the morning of surgery, hold the rest of her medications.    Patient completed stress echo and results are normal, will be cleared for surgery.      The information in this document, created by the medical scribe for me, accurately reflects the services I personally performed and the decisions made by me. I have reviewed and approved this document for accuracy prior to leaving the patient care area.  August 7, 2019 2:16 PM    Signed Electronically by: Pedro Mcclelland MD    Copy of this evaluation report is provided to requesting physician.

## 2019-08-07 NOTE — LETTER
August 14, 2019      Kyler GILL [place]  [address]  [facility] [place] [facility] [address]        Dear ,    We are writing to inform you of your test results.    Your test results fall within the expected range(s) or remain unchanged from previous results.  Please continue with current treatment plan.    Resulted Orders   Potassium   Result Value Ref Range    Potassium 4.2 3.4 - 5.3 mmol/L       If you have any questions or concerns, please call the clinic at the number listed above.       Sincerely,        Pedro Mcclelland MD

## 2019-08-08 ENCOUNTER — TELEPHONE (OUTPATIENT)
Dept: FAMILY MEDICINE | Facility: CLINIC | Age: 74
End: 2019-08-08

## 2019-08-08 DIAGNOSIS — I10 ESSENTIAL HYPERTENSION WITH GOAL BLOOD PRESSURE LESS THAN 140/90: ICD-10-CM

## 2019-08-08 DIAGNOSIS — M16.0 PRIMARY OSTEOARTHRITIS OF BOTH HIPS: Primary | ICD-10-CM

## 2019-08-08 DIAGNOSIS — Z86.2 HISTORY OF ANEMIA: ICD-10-CM

## 2019-08-08 LAB — POTASSIUM SERPL-SCNC: 4.2 MMOL/L (ref 3.4–5.3)

## 2019-08-08 NOTE — TELEPHONE ENCOUNTER
Krystin - pended lab orders - these are being request with regard to patient pre-op completed 8/7/19 - RIGHT DIRECT ANTERIOR TOTAL HIP ARTHROPLASTY (ALMA) .

## 2019-08-08 NOTE — TELEPHONE ENCOUNTER
[facility] MD requested the [place] lab place and complete hemoglobin and creatinine labs.

## 2019-08-09 ENCOUNTER — TELEPHONE (OUTPATIENT)
Dept: FAMILY MEDICINE | Facility: CLINIC | Age: 74
End: 2019-08-09

## 2019-08-09 ENCOUNTER — HOSPITAL ENCOUNTER (OUTPATIENT)
Dept: CARDIOLOGY | Facility: CLINIC | Age: 74
Discharge: HOME | End: 2019-08-09
Attending: FAMILY MEDICINE | Admitting: FAMILY MEDICINE
Payer: MEDICARE

## 2019-08-09 DIAGNOSIS — Z86.2 HISTORY OF ANEMIA: ICD-10-CM

## 2019-08-09 DIAGNOSIS — M16.0 PRIMARY OSTEOARTHRITIS OF BOTH HIPS: ICD-10-CM

## 2019-08-09 DIAGNOSIS — R94.31 ABNORMAL ELECTROCARDIOGRAM: ICD-10-CM

## 2019-08-09 DIAGNOSIS — Z01.818 PREOP GENERAL PHYSICAL EXAM: ICD-10-CM

## 2019-08-09 LAB
ANION GAP SERPL CALCULATED.3IONS-SCNC: 7 MMOL/L (ref 3–14)
BUN SERPL-MCNC: 20 MG/DL (ref 7–30)
CALCIUM SERPL-MCNC: 9.2 MG/DL (ref 8.5–10.1)
CHLORIDE SERPL-SCNC: 104 MMOL/L (ref 94–109)
CO2 SERPL-SCNC: 27 MMOL/L (ref 20–32)
CREAT SERPL-MCNC: 0.73 MG/DL (ref 0.52–1.04)
GFR SERPL CREATININE-BSD FRML MDRD: 79 ML/MIN/{1.73_M2}
GLUCOSE SERPL-MCNC: 110 MG/DL (ref 70–99)
HGB BLD-MCNC: 12.2 G/DL (ref 11.7–15.7)
POTASSIUM SERPL-SCNC: 3.3 MMOL/L (ref 3.4–5.3)
SODIUM SERPL-SCNC: 138 MMOL/L (ref 133–144)

## 2019-08-09 PROCEDURE — 85018 HEMOGLOBIN: CPT | Performed by: FAMILY MEDICINE

## 2019-08-09 PROCEDURE — 40000264 ECHO STRESS ECHOCARDIOGRAM

## 2019-08-09 PROCEDURE — 36415 COLL VENOUS BLD VENIPUNCTURE: CPT | Performed by: FAMILY MEDICINE

## 2019-08-09 PROCEDURE — 25000125 ZZHC RX 250: Performed by: INTERNAL MEDICINE

## 2019-08-09 PROCEDURE — 25000128 H RX IP 250 OP 636: Performed by: INTERNAL MEDICINE

## 2019-08-09 PROCEDURE — 80048 BASIC METABOLIC PNL TOTAL CA: CPT | Performed by: FAMILY MEDICINE

## 2019-08-09 PROCEDURE — 25500064 ZZH RX 255 OP 636: Performed by: INTERNAL MEDICINE

## 2019-08-09 RX ORDER — SODIUM CHLORIDE 9 MG/ML
INJECTION, SOLUTION INTRAVENOUS CONTINUOUS
Status: ACTIVE | OUTPATIENT
Start: 2019-08-09 | End: 2019-08-09

## 2019-08-09 RX ORDER — DOBUTAMINE HYDROCHLORIDE 200 MG/100ML
10-50 INJECTION INTRAVENOUS CONTINUOUS
Status: ACTIVE | OUTPATIENT
Start: 2019-08-09 | End: 2019-08-09

## 2019-08-09 RX ORDER — METOPROLOL TARTRATE 1 MG/ML
1-20 INJECTION, SOLUTION INTRAVENOUS
Status: ACTIVE | OUTPATIENT
Start: 2019-08-09 | End: 2019-08-09

## 2019-08-09 RX ORDER — ATROPINE SULFATE 0.4 MG/ML
.2-2 AMPUL (ML) INJECTION
Status: COMPLETED | OUTPATIENT
Start: 2019-08-09 | End: 2019-08-09

## 2019-08-09 RX ADMIN — ATROPINE SULFATE 0.4 MG: 0.4 INJECTION, SOLUTION INTRAMUSCULAR; INTRAVENOUS; SUBCUTANEOUS at 11:15

## 2019-08-09 RX ADMIN — DOBUTAMINE HYDROCHLORIDE 10 MCG/KG/MIN: 200 INJECTION INTRAVENOUS at 11:05

## 2019-08-09 RX ADMIN — METOPROLOL TARTRATE 4 MG: 5 INJECTION INTRAVENOUS at 11:19

## 2019-08-09 RX ADMIN — PERFLUTREN 7 ML: 6.52 INJECTION, SUSPENSION INTRAVENOUS at 11:21

## 2019-08-09 NOTE — PROGRESS NOTES
Pt here for dobutamine stress test. Test, meds and side effects reviewed with patient. Achieved target HR at 40 mcg/kg/min Dobutamine and a total of 0.4mg IV atropine. Gave a total of 4mg IV Metoprolol to bring HR back to baseline. Post monitoring complete and VSS. Pt escorted out to the gold waiting room.     
skill demonstration

## 2019-08-09 NOTE — TELEPHONE ENCOUNTER
Patient called and would like a call back ASAP with results. She states that she has to get started on scrubbing with the surgical soap today for her surgery sat. But needs the xray results in order to determine if they will need the surgery still. Can call the patient at [phone]. Ok to leave detailed message.

## 2019-08-09 NOTE — TELEPHONE ENCOUNTER
Patient calling to check the status of the orders and stating they need to be completed today for her pre-op. Please advise.

## 2019-08-10 RX ORDER — POTASSIUM CHLORIDE 750 MG/1
10 TABLET, EXTENDED RELEASE ORAL DAILY
Qty: 30 TABLET | Refills: 11 | Status: SHIPPED | OUTPATIENT
Start: 2019-08-10 | End: 2019-12-06

## 2019-08-11 NOTE — RESULT ENCOUNTER NOTE
Please notify patient: stress test normal, but potassium is low. Please get once daily potassium from pharmacy; OK for surgery!    Thanks Pedro

## 2019-08-12 RX ORDER — ZOLPIDEM TARTRATE 5 MG/1
5 TABLET ORAL AT BEDTIME
COMMUNITY
End: 2019-09-29

## 2019-08-12 RX ORDER — GABAPENTIN 300 MG/1
600 CAPSULE ORAL 2 TIMES DAILY
COMMUNITY
End: 2019-10-18

## 2019-08-12 RX ORDER — MULTIVIT WITH MINERALS/LUTEIN
1 TABLET ORAL DAILY
COMMUNITY

## 2019-08-12 NOTE — TELEPHONE ENCOUNTER
Called patient. She stated that she was looking for the results of her echocardiogram, not x-ray. She states that this has been handled. Dr. Mcclelland called and left a message for her on Saturday (08/10) that she can proceed with surgery.    Closing encounter as no further action required.    Jessica Jones RN  [facility]

## 2019-08-13 ENCOUNTER — APPOINTMENT (OUTPATIENT)
Dept: GENERAL RADIOLOGY | Facility: CLINIC | Age: 74
DRG: 470 | End: 2019-08-13
Attending: ORTHOPAEDIC SURGERY
Payer: MEDICARE

## 2019-08-13 ENCOUNTER — ANESTHESIA (OUTPATIENT)
Dept: SURGERY | Facility: CLINIC | Age: 74
DRG: 470 | End: 2019-08-13
Payer: MEDICARE

## 2019-08-13 ENCOUNTER — ANESTHESIA EVENT (OUTPATIENT)
Dept: SURGERY | Facility: CLINIC | Age: 74
DRG: 470 | End: 2019-08-13
Payer: MEDICARE

## 2019-08-13 ENCOUNTER — HOSPITAL ENCOUNTER (INPATIENT)
Facility: CLINIC | Age: 74
LOS: 2 days | Discharge: HOME | DRG: 470 | End: 2019-08-15
Attending: ORTHOPAEDIC SURGERY | Admitting: ORTHOPAEDIC SURGERY
Payer: MEDICARE

## 2019-08-13 DIAGNOSIS — Z96.641 STATUS POST TOTAL HIP REPLACEMENT, RIGHT: Primary | ICD-10-CM

## 2019-08-13 LAB
ABO + RH BLD: NORMAL
ABO + RH BLD: NORMAL
BLD GP AB SCN SERPL QL: NORMAL
BLOOD BANK CMNT PATIENT-IMP: NORMAL
POTASSIUM SERPL-SCNC: 3.5 MMOL/L (ref 3.4–5.3)
SPECIMEN EXP DATE BLD: NORMAL

## 2019-08-13 PROCEDURE — 71000013 ZZH RECOVERY PHASE 1 LEVEL 1 EA ADDTL HR: Performed by: ORTHOPAEDIC SURGERY

## 2019-08-13 PROCEDURE — 86901 BLOOD TYPING SEROLOGIC RH(D): CPT | Performed by: ANESTHESIOLOGY

## 2019-08-13 PROCEDURE — 25000128 H RX IP 250 OP 636: Performed by: ORTHOPAEDIC SURGERY

## 2019-08-13 PROCEDURE — C1713 ANCHOR/SCREW BN/BN,TIS/BN: HCPCS | Performed by: ORTHOPAEDIC SURGERY

## 2019-08-13 PROCEDURE — 25000125 ZZHC RX 250: Performed by: ORTHOPAEDIC SURGERY

## 2019-08-13 PROCEDURE — 25800030 ZZH RX IP 258 OP 636: Performed by: ORTHOPAEDIC SURGERY

## 2019-08-13 PROCEDURE — 0SR904A REPLACEMENT OF RIGHT HIP JOINT WITH CERAMIC ON POLYETHYLENE SYNTHETIC SUBSTITUTE, UNCEMENTED, OPEN APPROACH: ICD-10-PCS | Performed by: ORTHOPAEDIC SURGERY

## 2019-08-13 PROCEDURE — 84132 ASSAY OF SERUM POTASSIUM: CPT | Performed by: ANESTHESIOLOGY

## 2019-08-13 PROCEDURE — 40000170 ZZH STATISTIC PRE-PROCEDURE ASSESSMENT II: Performed by: ORTHOPAEDIC SURGERY

## 2019-08-13 PROCEDURE — 25800025 ZZH RX 258: Performed by: ORTHOPAEDIC SURGERY

## 2019-08-13 PROCEDURE — 25000132 ZZH RX MED GY IP 250 OP 250 PS 637: Mod: GY | Performed by: ORTHOPAEDIC SURGERY

## 2019-08-13 PROCEDURE — 86850 RBC ANTIBODY SCREEN: CPT | Performed by: ANESTHESIOLOGY

## 2019-08-13 PROCEDURE — 25000125 ZZHC RX 250: Performed by: NURSE ANESTHETIST, CERTIFIED REGISTERED

## 2019-08-13 PROCEDURE — 27210794 ZZH OR GENERAL SUPPLY STERILE: Performed by: ORTHOPAEDIC SURGERY

## 2019-08-13 PROCEDURE — 40000985 XR PELVIS AD HIP PORTABLE RIGHT 1 VIEW

## 2019-08-13 PROCEDURE — 36415 COLL VENOUS BLD VENIPUNCTURE: CPT | Performed by: ANESTHESIOLOGY

## 2019-08-13 PROCEDURE — 25000128 H RX IP 250 OP 636

## 2019-08-13 PROCEDURE — 25800030 ZZH RX IP 258 OP 636: Performed by: ANESTHESIOLOGY

## 2019-08-13 PROCEDURE — 25800030 ZZH RX IP 258 OP 636: Performed by: NURSE ANESTHETIST, CERTIFIED REGISTERED

## 2019-08-13 PROCEDURE — 25000128 H RX IP 250 OP 636: Performed by: NURSE ANESTHETIST, CERTIFIED REGISTERED

## 2019-08-13 PROCEDURE — 37000008 ZZH ANESTHESIA TECHNICAL FEE, 1ST 30 MIN: Performed by: ORTHOPAEDIC SURGERY

## 2019-08-13 PROCEDURE — 25000566 ZZH SEVOFLURANE, EA 15 MIN: Performed by: ORTHOPAEDIC SURGERY

## 2019-08-13 PROCEDURE — 12000000 ZZH R&B MED SURG/OB

## 2019-08-13 PROCEDURE — 71000012 ZZH RECOVERY PHASE 1 LEVEL 1 FIRST HR: Performed by: ORTHOPAEDIC SURGERY

## 2019-08-13 PROCEDURE — 86900 BLOOD TYPING SEROLOGIC ABO: CPT | Performed by: ANESTHESIOLOGY

## 2019-08-13 PROCEDURE — 36000065 ZZH SURGERY LEVEL 4 W FLUORO 1ST 30 MIN: Performed by: ORTHOPAEDIC SURGERY

## 2019-08-13 PROCEDURE — 36000063 ZZH SURGERY LEVEL 4 EA 15 ADDTL MIN: Performed by: ORTHOPAEDIC SURGERY

## 2019-08-13 PROCEDURE — C1776 JOINT DEVICE (IMPLANTABLE): HCPCS | Performed by: ORTHOPAEDIC SURGERY

## 2019-08-13 PROCEDURE — 37000009 ZZH ANESTHESIA TECHNICAL FEE, EACH ADDTL 15 MIN: Performed by: ORTHOPAEDIC SURGERY

## 2019-08-13 PROCEDURE — 25000125 ZZHC RX 250: Performed by: ANESTHESIOLOGY

## 2019-08-13 PROCEDURE — 40000277 XR SURGERY CARM FLUORO LESS THAN 5 MIN W STILLS

## 2019-08-13 DEVICE — IMP FEMORAL NECK ZIM MOD TAPER KINECTIV K 00-7848-002-00: Type: IMPLANTABLE DEVICE | Site: HIP | Status: FUNCTIONAL

## 2019-08-13 DEVICE — IMPLANTABLE DEVICE: Type: IMPLANTABLE DEVICE | Site: HIP | Status: FUNCTIONAL

## 2019-08-13 DEVICE — IMP HEAD FEM ZIM BIOLOX DELTA CER 32MM +0 00-8775-032-02: Type: IMPLANTABLE DEVICE | Site: HIP | Status: FUNCTIONAL

## 2019-08-13 DEVICE — IMP SCR ZIM 6.5X25MM ACET CUP SELF TAP 00-6250-065-25: Type: IMPLANTABLE DEVICE | Site: HIP | Status: FUNCTIONAL

## 2019-08-13 DEVICE — IMP SCR ZIM 6.5X35MM ACET CUP SELF TAP 00-6250-065-35: Type: IMPLANTABLE DEVICE | Site: HIP | Status: FUNCTIONAL

## 2019-08-13 RX ORDER — METOPROLOL SUCCINATE 50 MG/1
50 TABLET, EXTENDED RELEASE ORAL 2 TIMES DAILY
Status: DISCONTINUED | OUTPATIENT
Start: 2019-08-13 | End: 2019-08-15 | Stop reason: HOSPADM

## 2019-08-13 RX ORDER — ACETAMINOPHEN 325 MG/1
650 TABLET ORAL EVERY 4 HOURS PRN
Status: DISCONTINUED | OUTPATIENT
Start: 2019-08-16 | End: 2019-08-15 | Stop reason: HOSPADM

## 2019-08-13 RX ORDER — GABAPENTIN 300 MG/1
600 CAPSULE ORAL 2 TIMES DAILY
Status: DISCONTINUED | OUTPATIENT
Start: 2019-08-13 | End: 2019-08-15 | Stop reason: HOSPADM

## 2019-08-13 RX ORDER — SODIUM CHLORIDE, SODIUM LACTATE, POTASSIUM CHLORIDE, CALCIUM CHLORIDE 600; 310; 30; 20 MG/100ML; MG/100ML; MG/100ML; MG/100ML
INJECTION, SOLUTION INTRAVENOUS CONTINUOUS
Status: DISCONTINUED | OUTPATIENT
Start: 2019-08-13 | End: 2019-08-13 | Stop reason: HOSPADM

## 2019-08-13 RX ORDER — BENZOIN
TINCTURE TOPICAL PRN
Status: DISCONTINUED | OUTPATIENT
Start: 2019-08-13 | End: 2019-08-13 | Stop reason: HOSPADM

## 2019-08-13 RX ORDER — LIDOCAINE 40 MG/G
CREAM TOPICAL
Status: DISCONTINUED | OUTPATIENT
Start: 2019-08-13 | End: 2019-08-15 | Stop reason: HOSPADM

## 2019-08-13 RX ORDER — ONDANSETRON 2 MG/ML
INJECTION INTRAMUSCULAR; INTRAVENOUS PRN
Status: DISCONTINUED | OUTPATIENT
Start: 2019-08-13 | End: 2019-08-13

## 2019-08-13 RX ORDER — FENTANYL CITRATE 50 UG/ML
25-50 INJECTION, SOLUTION INTRAMUSCULAR; INTRAVENOUS
Status: DISCONTINUED | OUTPATIENT
Start: 2019-08-13 | End: 2019-08-13 | Stop reason: HOSPADM

## 2019-08-13 RX ORDER — CYCLOBENZAPRINE HCL 5 MG
5 TABLET ORAL 3 TIMES DAILY PRN
Status: DISCONTINUED | OUTPATIENT
Start: 2019-08-13 | End: 2019-08-15 | Stop reason: HOSPADM

## 2019-08-13 RX ORDER — PROPOFOL 10 MG/ML
INJECTION, EMULSION INTRAVENOUS CONTINUOUS PRN
Status: DISCONTINUED | OUTPATIENT
Start: 2019-08-13 | End: 2019-08-13

## 2019-08-13 RX ORDER — MAGNESIUM HYDROXIDE 1200 MG/15ML
LIQUID ORAL PRN
Status: DISCONTINUED | OUTPATIENT
Start: 2019-08-13 | End: 2019-08-13 | Stop reason: HOSPADM

## 2019-08-13 RX ORDER — ONDANSETRON 2 MG/ML
4 INJECTION INTRAMUSCULAR; INTRAVENOUS EVERY 30 MIN PRN
Status: DISCONTINUED | OUTPATIENT
Start: 2019-08-13 | End: 2019-08-13 | Stop reason: HOSPADM

## 2019-08-13 RX ORDER — PROPOFOL 10 MG/ML
INJECTION, EMULSION INTRAVENOUS PRN
Status: DISCONTINUED | OUTPATIENT
Start: 2019-08-13 | End: 2019-08-13

## 2019-08-13 RX ORDER — HYDROMORPHONE HYDROCHLORIDE 1 MG/ML
.3-.5 INJECTION, SOLUTION INTRAMUSCULAR; INTRAVENOUS; SUBCUTANEOUS EVERY 5 MIN PRN
Status: DISCONTINUED | OUTPATIENT
Start: 2019-08-13 | End: 2019-08-13 | Stop reason: HOSPADM

## 2019-08-13 RX ORDER — PROCHLORPERAZINE MALEATE 5 MG
5 TABLET ORAL EVERY 6 HOURS PRN
Status: DISCONTINUED | OUTPATIENT
Start: 2019-08-13 | End: 2019-08-15 | Stop reason: HOSPADM

## 2019-08-13 RX ORDER — FENTANYL CITRATE 50 UG/ML
INJECTION, SOLUTION INTRAMUSCULAR; INTRAVENOUS PRN
Status: DISCONTINUED | OUTPATIENT
Start: 2019-08-13 | End: 2019-08-13

## 2019-08-13 RX ORDER — CEFAZOLIN SODIUM 2 G/100ML
2 INJECTION, SOLUTION INTRAVENOUS
Status: COMPLETED | OUTPATIENT
Start: 2019-08-13 | End: 2019-08-13

## 2019-08-13 RX ORDER — DIPHENHYDRAMINE HCL 12.5MG/5ML
12.5 LIQUID (ML) ORAL EVERY 6 HOURS PRN
Status: DISCONTINUED | OUTPATIENT
Start: 2019-08-13 | End: 2019-08-15 | Stop reason: HOSPADM

## 2019-08-13 RX ORDER — DEXTROSE MONOHYDRATE, SODIUM CHLORIDE, AND POTASSIUM CHLORIDE 50; 1.49; 4.5 G/1000ML; G/1000ML; G/1000ML
INJECTION, SOLUTION INTRAVENOUS CONTINUOUS
Status: DISCONTINUED | OUTPATIENT
Start: 2019-08-13 | End: 2019-08-15 | Stop reason: HOSPADM

## 2019-08-13 RX ORDER — LIDOCAINE HYDROCHLORIDE 20 MG/ML
INJECTION, SOLUTION INFILTRATION; PERINEURAL PRN
Status: DISCONTINUED | OUTPATIENT
Start: 2019-08-13 | End: 2019-08-13

## 2019-08-13 RX ORDER — ACETAMINOPHEN 325 MG/1
975 TABLET ORAL EVERY 8 HOURS
Status: DISCONTINUED | OUTPATIENT
Start: 2019-08-13 | End: 2019-08-15 | Stop reason: HOSPADM

## 2019-08-13 RX ORDER — ONDANSETRON 2 MG/ML
4 INJECTION INTRAMUSCULAR; INTRAVENOUS EVERY 6 HOURS PRN
Status: DISCONTINUED | OUTPATIENT
Start: 2019-08-13 | End: 2019-08-15 | Stop reason: HOSPADM

## 2019-08-13 RX ORDER — CEFAZOLIN SODIUM 2 G/100ML
2 INJECTION, SOLUTION INTRAVENOUS EVERY 8 HOURS
Status: COMPLETED | OUTPATIENT
Start: 2019-08-13 | End: 2019-08-14

## 2019-08-13 RX ORDER — EPHEDRINE SULFATE 50 MG/ML
INJECTION, SOLUTION INTRAMUSCULAR; INTRAVENOUS; SUBCUTANEOUS PRN
Status: DISCONTINUED | OUTPATIENT
Start: 2019-08-13 | End: 2019-08-13

## 2019-08-13 RX ORDER — MULTIPLE VITAMINS W/ MINERALS TAB 9MG-400MCG
1 TAB ORAL DAILY
Status: DISCONTINUED | OUTPATIENT
Start: 2019-08-13 | End: 2019-08-15 | Stop reason: HOSPADM

## 2019-08-13 RX ORDER — OXYCODONE HYDROCHLORIDE 5 MG/1
5-10 TABLET ORAL EVERY 4 HOURS PRN
Status: DISCONTINUED | OUTPATIENT
Start: 2019-08-13 | End: 2019-08-15 | Stop reason: HOSPADM

## 2019-08-13 RX ORDER — DEXAMETHASONE SODIUM PHOSPHATE 4 MG/ML
INJECTION, SOLUTION INTRA-ARTICULAR; INTRALESIONAL; INTRAMUSCULAR; INTRAVENOUS; SOFT TISSUE PRN
Status: DISCONTINUED | OUTPATIENT
Start: 2019-08-13 | End: 2019-08-13

## 2019-08-13 RX ORDER — HYDROMORPHONE HYDROCHLORIDE 1 MG/ML
.3-.5 INJECTION, SOLUTION INTRAMUSCULAR; INTRAVENOUS; SUBCUTANEOUS EVERY 30 MIN PRN
Status: DISCONTINUED | OUTPATIENT
Start: 2019-08-13 | End: 2019-08-15 | Stop reason: HOSPADM

## 2019-08-13 RX ORDER — VANCOMYCIN HCL 900 MCG/MG
POWDER (GRAM) MISCELLANEOUS PRN
Status: DISCONTINUED | OUTPATIENT
Start: 2019-08-13 | End: 2019-08-13 | Stop reason: HOSPADM

## 2019-08-13 RX ORDER — OXYCODONE HCL 10 MG/1
10 TABLET, FILM COATED, EXTENDED RELEASE ORAL ONCE
Status: COMPLETED | OUTPATIENT
Start: 2019-08-13 | End: 2019-08-13

## 2019-08-13 RX ORDER — ALLOPURINOL 100 MG/1
100 TABLET ORAL DAILY
Status: DISCONTINUED | OUTPATIENT
Start: 2019-08-13 | End: 2019-08-15 | Stop reason: HOSPADM

## 2019-08-13 RX ORDER — NEOSTIGMINE METHYLSULFATE 1 MG/ML
VIAL (ML) INJECTION PRN
Status: DISCONTINUED | OUTPATIENT
Start: 2019-08-13 | End: 2019-08-13

## 2019-08-13 RX ORDER — GLYCOPYRROLATE 0.2 MG/ML
INJECTION, SOLUTION INTRAMUSCULAR; INTRAVENOUS PRN
Status: DISCONTINUED | OUTPATIENT
Start: 2019-08-13 | End: 2019-08-13

## 2019-08-13 RX ORDER — ONDANSETRON 4 MG/1
4 TABLET, ORALLY DISINTEGRATING ORAL EVERY 30 MIN PRN
Status: DISCONTINUED | OUTPATIENT
Start: 2019-08-13 | End: 2019-08-13 | Stop reason: HOSPADM

## 2019-08-13 RX ORDER — NALOXONE HYDROCHLORIDE 0.4 MG/ML
.1-.4 INJECTION, SOLUTION INTRAMUSCULAR; INTRAVENOUS; SUBCUTANEOUS
Status: CANCELLED | OUTPATIENT
Start: 2019-08-13 | End: 2019-08-14

## 2019-08-13 RX ORDER — AMOXICILLIN 250 MG
1 CAPSULE ORAL 2 TIMES DAILY
Status: DISCONTINUED | OUTPATIENT
Start: 2019-08-13 | End: 2019-08-15 | Stop reason: HOSPADM

## 2019-08-13 RX ORDER — DIPHENHYDRAMINE HYDROCHLORIDE 50 MG/ML
12.5 INJECTION INTRAMUSCULAR; INTRAVENOUS EVERY 6 HOURS PRN
Status: DISCONTINUED | OUTPATIENT
Start: 2019-08-13 | End: 2019-08-15 | Stop reason: HOSPADM

## 2019-08-13 RX ORDER — POTASSIUM CHLORIDE 750 MG/1
10 TABLET, EXTENDED RELEASE ORAL DAILY
Status: DISCONTINUED | OUTPATIENT
Start: 2019-08-13 | End: 2019-08-15 | Stop reason: HOSPADM

## 2019-08-13 RX ORDER — LOSARTAN POTASSIUM 50 MG/1
50 TABLET ORAL DAILY
Status: DISCONTINUED | OUTPATIENT
Start: 2019-08-13 | End: 2019-08-15 | Stop reason: HOSPADM

## 2019-08-13 RX ORDER — AMOXICILLIN 250 MG
2 CAPSULE ORAL 2 TIMES DAILY
Status: DISCONTINUED | OUTPATIENT
Start: 2019-08-13 | End: 2019-08-15 | Stop reason: HOSPADM

## 2019-08-13 RX ORDER — ONDANSETRON 4 MG/1
4 TABLET, ORALLY DISINTEGRATING ORAL EVERY 6 HOURS PRN
Status: DISCONTINUED | OUTPATIENT
Start: 2019-08-13 | End: 2019-08-15 | Stop reason: HOSPADM

## 2019-08-13 RX ORDER — CEFAZOLIN SODIUM 1 G/3ML
1 INJECTION, POWDER, FOR SOLUTION INTRAMUSCULAR; INTRAVENOUS SEE ADMIN INSTRUCTIONS
Status: DISCONTINUED | OUTPATIENT
Start: 2019-08-13 | End: 2019-08-13 | Stop reason: HOSPADM

## 2019-08-13 RX ORDER — NALOXONE HYDROCHLORIDE 0.4 MG/ML
.1-.4 INJECTION, SOLUTION INTRAMUSCULAR; INTRAVENOUS; SUBCUTANEOUS
Status: DISCONTINUED | OUTPATIENT
Start: 2019-08-13 | End: 2019-08-15 | Stop reason: HOSPADM

## 2019-08-13 RX ADMIN — ALLOPURINOL 100 MG: 100 TABLET ORAL at 19:08

## 2019-08-13 RX ADMIN — GLYCOPYRROLATE 0.6 MG: 0.2 INJECTION, SOLUTION INTRAMUSCULAR; INTRAVENOUS at 15:27

## 2019-08-13 RX ADMIN — NEOSTIGMINE METHYLSULFATE 4 MG: 1 INJECTION, SOLUTION INTRAVENOUS at 15:27

## 2019-08-13 RX ADMIN — CEFAZOLIN SODIUM 2 G: 2 INJECTION, SOLUTION INTRAVENOUS at 13:20

## 2019-08-13 RX ADMIN — CEFAZOLIN SODIUM 1 G: 2 INJECTION, SOLUTION INTRAVENOUS at 15:20

## 2019-08-13 RX ADMIN — CEFAZOLIN SODIUM 2 G: 2 INJECTION, SOLUTION INTRAVENOUS at 22:03

## 2019-08-13 RX ADMIN — ZOLPIDEM TARTRATE 2.5 MG: 5 TABLET, FILM COATED ORAL at 21:09

## 2019-08-13 RX ADMIN — OXYCODONE HYDROCHLORIDE 10 MG: 10 TABLET, FILM COATED, EXTENDED RELEASE ORAL at 12:01

## 2019-08-13 RX ADMIN — PROPOFOL 150 MG: 10 INJECTION, EMULSION INTRAVENOUS at 13:14

## 2019-08-13 RX ADMIN — Medication 5 MG: at 15:16

## 2019-08-13 RX ADMIN — SODIUM CHLORIDE 1 G: 9 INJECTION, SOLUTION INTRAVENOUS at 14:57

## 2019-08-13 RX ADMIN — MIDAZOLAM 1 MG: 1 INJECTION INTRAMUSCULAR; INTRAVENOUS at 12:56

## 2019-08-13 RX ADMIN — PHENYLEPHRINE HYDROCHLORIDE 100 MCG: 10 INJECTION INTRAVENOUS at 14:52

## 2019-08-13 RX ADMIN — LIDOCAINE HYDROCHLORIDE 100 MG: 20 INJECTION, SOLUTION INFILTRATION; PERINEURAL at 13:14

## 2019-08-13 RX ADMIN — PROPOFOL 50 MCG/KG/MIN: 10 INJECTION, EMULSION INTRAVENOUS at 13:14

## 2019-08-13 RX ADMIN — LIDOCAINE HYDROCHLORIDE 0.5 ML: 10 INJECTION, SOLUTION EPIDURAL; INFILTRATION; INTRACAUDAL; PERINEURAL at 12:02

## 2019-08-13 RX ADMIN — ROCURONIUM BROMIDE 10 MG: 10 INJECTION INTRAVENOUS at 14:24

## 2019-08-13 RX ADMIN — PHENYLEPHRINE HYDROCHLORIDE 100 MCG: 10 INJECTION INTRAVENOUS at 13:25

## 2019-08-13 RX ADMIN — SODIUM CHLORIDE 1 G: 9 INJECTION, SOLUTION INTRAVENOUS at 13:28

## 2019-08-13 RX ADMIN — FENTANYL CITRATE 100 MCG: 50 INJECTION, SOLUTION INTRAMUSCULAR; INTRAVENOUS at 13:14

## 2019-08-13 RX ADMIN — SENNOSIDES AND DOCUSATE SODIUM 2 TABLET: 8.6; 5 TABLET ORAL at 22:01

## 2019-08-13 RX ADMIN — DEXMEDETOMIDINE HYDROCHLORIDE 12 MCG: 100 INJECTION, SOLUTION INTRAVENOUS at 13:58

## 2019-08-13 RX ADMIN — LOSARTAN POTASSIUM 50 MG: 50 TABLET ORAL at 19:09

## 2019-08-13 RX ADMIN — DEXMEDETOMIDINE HYDROCHLORIDE 8 MCG: 100 INJECTION, SOLUTION INTRAVENOUS at 14:06

## 2019-08-13 RX ADMIN — POTASSIUM CHLORIDE 10 MEQ: 10 TABLET, EXTENDED RELEASE ORAL at 19:08

## 2019-08-13 RX ADMIN — POTASSIUM CHLORIDE, DEXTROSE MONOHYDRATE AND SODIUM CHLORIDE: 150; 5; 450 INJECTION, SOLUTION INTRAVENOUS at 19:09

## 2019-08-13 RX ADMIN — HYDROMORPHONE HYDROCHLORIDE 0.5 MG: 1 INJECTION, SOLUTION INTRAMUSCULAR; INTRAVENOUS; SUBCUTANEOUS at 14:07

## 2019-08-13 RX ADMIN — METOPROLOL SUCCINATE 50 MG: 50 TABLET, EXTENDED RELEASE ORAL at 21:09

## 2019-08-13 RX ADMIN — OXYCODONE HYDROCHLORIDE 5 MG: 5 TABLET ORAL at 21:25

## 2019-08-13 RX ADMIN — ACETAMINOPHEN 975 MG: 325 TABLET, FILM COATED ORAL at 21:09

## 2019-08-13 RX ADMIN — MIDAZOLAM 0.5 MG: 1 INJECTION INTRAMUSCULAR; INTRAVENOUS at 13:10

## 2019-08-13 RX ADMIN — FENTANYL CITRATE 50 MCG: 50 INJECTION, SOLUTION INTRAMUSCULAR; INTRAVENOUS at 13:56

## 2019-08-13 RX ADMIN — MULTIPLE VITAMINS W/ MINERALS TAB 1 TABLET: TAB at 19:09

## 2019-08-13 RX ADMIN — SODIUM CHLORIDE, POTASSIUM CHLORIDE, SODIUM LACTATE AND CALCIUM CHLORIDE: 600; 310; 30; 20 INJECTION, SOLUTION INTRAVENOUS at 14:07

## 2019-08-13 RX ADMIN — DEXAMETHASONE SODIUM PHOSPHATE 4 MG: 4 INJECTION, SOLUTION INTRA-ARTICULAR; INTRALESIONAL; INTRAMUSCULAR; INTRAVENOUS; SOFT TISSUE at 13:14

## 2019-08-13 RX ADMIN — PHENYLEPHRINE HYDROCHLORIDE 100 MCG: 10 INJECTION INTRAVENOUS at 13:32

## 2019-08-13 RX ADMIN — GABAPENTIN 600 MG: 300 CAPSULE ORAL at 21:09

## 2019-08-13 RX ADMIN — SENNOSIDES AND DOCUSATE SODIUM 1 TABLET: 8.6; 5 TABLET ORAL at 21:09

## 2019-08-13 RX ADMIN — PHENYLEPHRINE HYDROCHLORIDE 50 MCG: 10 INJECTION INTRAVENOUS at 14:28

## 2019-08-13 RX ADMIN — SODIUM CHLORIDE, POTASSIUM CHLORIDE, SODIUM LACTATE AND CALCIUM CHLORIDE: 600; 310; 30; 20 INJECTION, SOLUTION INTRAVENOUS at 12:01

## 2019-08-13 RX ADMIN — PHENYLEPHRINE HYDROCHLORIDE 50 MCG: 10 INJECTION INTRAVENOUS at 14:32

## 2019-08-13 RX ADMIN — MIDAZOLAM 0.5 MG: 1 INJECTION INTRAMUSCULAR; INTRAVENOUS at 13:03

## 2019-08-13 RX ADMIN — ROCURONIUM BROMIDE 50 MG: 10 INJECTION INTRAVENOUS at 13:14

## 2019-08-13 RX ADMIN — FENTANYL CITRATE 50 MCG: 50 INJECTION, SOLUTION INTRAMUSCULAR; INTRAVENOUS at 13:47

## 2019-08-13 RX ADMIN — ONDANSETRON 4 MG: 2 INJECTION INTRAMUSCULAR; INTRAVENOUS at 13:14

## 2019-08-13 ASSESSMENT — ACTIVITIES OF DAILY LIVING (ADL): ADLS_ACUITY_SCORE: 16

## 2019-08-13 ASSESSMENT — COPD QUESTIONNAIRES: COPD: 0

## 2019-08-13 ASSESSMENT — MIFFLIN-ST. JEOR: SCORE: 1327.77

## 2019-08-13 ASSESSMENT — ENCOUNTER SYMPTOMS: SEIZURES: 0

## 2019-08-13 NOTE — ANESTHESIA POSTPROCEDURE EVALUATION
Patient: Kyler Whitlock    Procedure(s):  RIGHT DIRECT ANTERIOR TOTAL HIP ARTHROPLASTY    Diagnosis:djd  Diagnosis Additional Information: No value filed.    Anesthesia Type:  General, ETT    Note:  Anesthesia Post Evaluation    Patient location during evaluation: bedside  Patient participation: Able to fully participate in evaluation  Level of consciousness: awake  Pain management: adequate  Airway patency: patent  Cardiovascular status: acceptable  Respiratory status: acceptable  Hydration status: acceptable  PONV: none     Anesthetic complications: None    Comments: No anesthetic complications noted.         Last vitals:  Vitals:    08/13/19 1620 08/13/19 1630 08/13/19 1640   BP: (!) 152/73 (!) 158/71 (!) 165/76   Pulse: 54 53 52   Resp: 18 17 18   Temp: 35.4  C (95.7  F) 35.3  C (95.5  F) 35.3  C (95.5  F)   SpO2: 100% 100% 100%         Electronically Signed By: Bethel Zaragoza DO, DO  August 13, 2019  4:47 PM

## 2019-08-13 NOTE — ANESTHESIA PREPROCEDURE EVALUATION
Anesthesia Pre-Procedure Evaluation    Patient: Kyler Whitlock   MRN: [id number] : 1945          Preoperative Diagnosis: djd    Procedure(s):  RIGHT DIRECT ANTERIOR TOTAL HIP ARTHROPLASTY (ALMA)^    Past Medical History:   Diagnosis Date     BMI 40.0-44.9, adult (H) 2017     Esophageal reflux     Gastroesophageal reflux     Left knee pain      Nonspecific abnormal electrocardiogram (ECG) (EKG) 2013     Past Surgical History:   Procedure Laterality Date     CHOLECYSTECTOMY       HERNIORRHAPHY VENTRAL  2012    Procedure:HERNIORRHAPHY VENTRAL; ventral hernia repair with mesh; Surgeon:ELOISA INMAN; Location:[facility]     HYSTERECTOMY  01    uterine prolapse/ant. repair       Anesthesia Evaluation     .             ROS/MED HX    ENT/Pulmonary:     (+)JACKLYN risk factors snores loudly, hypertension, obese, , . .   (-) asthma and COPD   Neurologic:      (-) seizures and CVA   Cardiovascular:     (+) hypertension----. : . . . :. . Previous cardiac testing date:results:Stress Testdate:19 results:Dobutamine Stress Echo  Interpretation Summary  Negative dobutamine stress echocardiogram.  No regional wall motion abnormalities at rest and with infusion of dobutamine.  Normal LV size and function at rest. The LVEF is 55-60% and increases  appropriately to 70-75% with dobutamine infusion.  Normal blood pressure response to dobutamine infusion.  Non-specific ECG changes at rest and with infusion of dobutamine.  Test was terminated when target heart rate was achieved.  No subjective evidence of ischemia at rest and with infusion of dobutamine. No  reported chest discomfort.  No significant valvular disease noted on routine screening color flow Doppler  and pulsed Doppler examination. The ascending aortic size appears normal. date: results: date: results:          METS/Exercise Tolerance:     Hematologic:         Musculoskeletal:   (+) arthritis,  -       GI/Hepatic:     (+) GERD      (-) liver  disease   Renal/Genitourinary:         Endo:     (+) Obesity, .   (-) Type II DM and thyroid disease   Psychiatric:         Infectious Disease:         Malignancy:         Other:                          Physical Exam  Normal systems: dental    Airway   Mallampati: II  TM distance: >3 FB  Neck ROM: full    Dental     Cardiovascular   Rhythm and rate: regular      Pulmonary             Lab Results   Component Value Date    WBC 6.6 03/29/2018    HGB 12.2 08/09/2019    HCT 40.8 03/29/2018     03/29/2018    CRP <2.9 07/08/2016    SED 37 (H) 07/08/2016     08/09/2019    POTASSIUM 3.3 (L) 08/09/2019    CHLORIDE 104 08/09/2019    CO2 27 08/09/2019    BUN 20 08/09/2019    CR 0.73 08/09/2019     (H) 08/09/2019    RICO 9.2 08/09/2019    ALBUMIN 3.7 02/19/2019    PROTTOTAL 7.6 02/19/2019    ALT 24 02/19/2019    AST 21 02/19/2019    ALKPHOS 75 02/19/2019    BILITOTAL 0.5 02/19/2019    TSH 3.01 02/19/2019    T4 1.08 07/08/2016    T4 10.3 07/08/2016       Preop Vitals  BP Readings from Last 3 Encounters:   08/07/19 136/80   04/29/19 138/82   12/12/18 130/60    Pulse Readings from Last 3 Encounters:   08/07/19 60   04/29/19 70   12/12/18 66      Resp Readings from Last 3 Encounters:   12/12/18 16   12/05/18 18   09/24/18 16    SpO2 Readings from Last 3 Encounters:   08/07/19 98%   04/29/19 96%   12/12/18 97%      Temp Readings from Last 1 Encounters:   08/07/19 36.3  C (97.3  F) (Oral)    Ht Readings from Last 1 Encounters:   08/07/19 1.524 m (5')      Wt Readings from Last 1 Encounters:   08/07/19 88.9 kg (196 lb)    Estimated body mass index is 38.28 kg/m  as calculated from the following:    Height as of 8/7/19: 1.524 m (5').    Weight as of 8/7/19: 88.9 kg (196 lb).       Anesthesia Plan      History & Physical Review  History and physical reviewed and following examination; no interval change.    ASA Status:  2 .    NPO Status:  > 8 hours    Plan for General and ETT with Propofol induction. Maintenance will  be Balanced.    PONV prophylaxis:  Ondansetron (or other 5HT-3) and Dexamethasone or Solumedrol  Additional equipment: Videolaryngoscope      Postoperative Care  Postoperative pain management:  IV analgesics and Oral pain medications.      Consents  Anesthetic plan, risks, benefits and alternatives discussed with:  Patient..                 Miguel Villegas MD

## 2019-08-13 NOTE — ANESTHESIA CARE TRANSFER NOTE
Patient: Kyler Whitlock    Procedure(s):  RIGHT DIRECT ANTERIOR TOTAL HIP ARTHROPLASTY    Diagnosis: djd  Diagnosis Additional Information: No value filed.    Anesthesia Type:   General, ETT     Note:  Airway :Face Mask  Patient transferred to:PACU  Comments: Neuromuscular blockade reversed after TOF 4/4, spontaneous respirations, adequate tidal volumes, followed commands to voice, oropharynx suctioned with soft flexible catheter, extubated atraumatically, extubated with suction, airway patent after extubation.  Oxygen via facemask at 8 liters per minute to PACU. Oxygen tubing connected to wall O2 in PACU, SpO2, NiBP, and EKG monitors and alarms on and functioning, Eve Hugger warmer connected to patient gown, report on patient's clinical status given to PACU RN, RN questions answered. Handoff Report: Identifed the Patient, Identified the Reponsible Provider, Reviewed the pertinent medical history, Discussed the surgical course, Reviewed Intra-OP anesthesia mangement and issues during anesthesia, Set expectations for post-procedure period and Allowed opportunity for questions and acknowledgement of understanding      Vitals: (Last set prior to Anesthesia Care Transfer)    CRNA VITALS  8/13/2019 1517 - 8/13/2019 1549      8/13/2019             Pulse:  66    SpO2:  100 %    Resp Rate (observed):  12                Electronically Signed By: CHIKI Rivera CRNA  August 13, 2019  3:49 PM

## 2019-08-13 NOTE — BRIEF OP NOTE
[facility]    Brief Operative Note    Pre-operative diagnosis: djd right hip  Post-operative diagnosis djd right hip  Procedure: Procedure(s):  RIGHT DIRECT ANTERIOR TOTAL HIP ARTHROPLASTY  Surgeon: Surgeon(s) and Role:     * Gt Castillo MD - Primary     * Christian Quintanilla PA-C - Assisting  Anesthesia: General   Estimated blood loss: 300 ml  Drains: None  Specimens: * No specimens in log *  Findings:   Advanced DJD right hip.  Complications: None.  Implants:    Implant Name Type Inv. Item Serial No.  Lot No. LRB No. Used   IMP SHELL ACETABULUM ZIM MULTI 48MM -397-20 Total Joint Component/Insert IMP SHELL ACETABULUM ZIM MULTI 48MM -373-20  [employer] [id number] Right 1   IMP SCR ZIM 6.5X25MM ACET CUP SELF TAP -942-25 Metallic Hardware/[place] IMP SCR ZIM 6.5X25MM ACET CUP SELF TAP -861-25  [employer] [id number] Right 1   IMP LINER ACETABULUM ZIM XLPE [id number] -398-32 Total Joint Component/Insert IMP LINER ACETABULUM ZIM XLPE [id number] -329-32  [employer] [id number] Right 1   IMP SCR ZIM 6.5X35MM ACET CUP SELF TAP -262-35 Metallic Hardware/[place] IMP SCR ZIM 6.5X35MM ACET CUP SELF TAP -288-35  [employer] [id number] Right 1   IMP STEM ZIM TAPER KINECTIV M/L SZ 7.5 -007-00 Total Joint Component/Insert IMP STEM ZIM TAPER KINECTIV M/L SZ 7.5 -007-00  [employer] [id number] Right 1   IMP HEAD FEM ZIM BIOLOX DELTA CER 32MM +0 -032-02 Total Joint Component/Insert IMP HEAD FEM ZIM BIOLOX DELTA CER 32MM +0 -634-02  [employer] [id number] Right 1   IMP FEMORAL NECK ZIM MOD TAPER KINECTIV K -438-00 Total Joint Component/Insert IMP FEMORAL NECK ZIM MOD TAPER KINECTIV K -924-00  [facility] [employer] [facility] [id number] Right 1

## 2019-08-13 NOTE — OP NOTE
Procedure Date: 08/13/2019      PREOPERATIVE DIAGNOSIS:  Advanced degenerative arthritis, right hip.      POSTOPERATIVE DIAGNOSIS:  Advanced degenerative arthritis, right hip.      PROCEDURE:  Right direct anterior total hip arthroplasty.        SURGEON:  Gt Castillo MD.       FIRST ASSISTANT:  Christian Quintanilla PA-C.      PROCEDURE IN DETAIL:  The patient was brought to the operating room, given a general anesthetic.  Her right hip and right lower extremity were then prepped and draped in the usual sterile fashion.  It should be noted that the entire procedure was made significantly more difficult because of the patient's large body habitus with a BMI of 40.  She had a large pannus and we sutured that up out of the way using several #5 Ethibond sutures in the skin and then those were removed at the end of the procedure.        An incision was made over the anterior aspect of the hip.  This was carried down through subcutaneous tissues down to the fascia.  The fascia was incised longitudinally and the interval between the tensor fascia nancy and sartorius was developed deep.  The rectus muscle was reflected medially exposing the circumflex vessels which were cauterized.  The hip capsule was then exposed.  We excised the anterior capsule exposing the femoral head and neck.  There were large osteophytes rimming the femoral head and neck consistent with her preoperative x-rays.  An osteotomy was then made at the level of our preoperatively templated osteotomy level, and the femoral head and neck were removed together.  There were advanced degenerative changes on the femoral head as well as at the acetabulum with complete loss of articular cartilage consistent with her x-rays.        Attention was then turned to the acetabulum.  The acetabulum was carefully reamed to 47 mm.  A 48 mm Trilogy cup was then impacted into place.  This had excellent fixation, which was supplemented with 2 screws, which also had very  good fixation.  A standard highly crosslink polyethylene liner to accept a size 32 head was then snapped into the acetabular component.        Attention was then turned to the femur.  The piriformis was released, allowing good exposure of the opening of the femoral canal.  The canal was opened with a starter reamer and then lateralized with a lateralizing reamer, and then we sequentially broached the hip up to a size 7.5 M/L taper broach.  This appeared to fit nicely.  The broach was removed.  A real size 7.5 Kinectiv stem was then impacted into the femur.  This appeared to have excellent fixation.  We then trialed the hip with various neck lengths and offsets and it appeared that a standard offset -8 neck gave the best fit.  A real standard offset -8 neck with a size 32+0 ceramic head were then impacted onto the stem.  The hip was relocated.  The soft tissue tension appeared satisfactory.  The hip was stable through a full range of motion and the leg length and offset appeared to be restored.  The wound was then irrigated with a dilute solution of Betadine.  This was allowed to sit within the wound for 3 minutes and then was thoroughly irrigated from the wound with a liter of normal saline.  One gram of vancomycin was then sprinkled into the wound.  The fascia was closed with a running #1 Vicryl suture.  The skin was closed with 2-0 Vicryl subcutaneous sutures and a 3-0 Monocryl running subcuticular suture with Steri-Strips.  A sterile dressing was applied to the wound.  The patient was then awakened from anesthesia and transferred to postanesthesia recovery in satisfactory condition.        It should be noted that my assistant was necessary throughout the entire procedure to assist with retraction and positioning.         SUKHWINDER DAMIAN MD             D: 2019   T: 2019   MT: ANCA      Name:     KEREN BRUNNER   MRN:      [id number]        Account:        [id number]   :      1945            Procedure Date: 08/13/2019      Document: [id number]

## 2019-08-13 NOTE — PROGRESS NOTES
Admission medication history interview status for the 8/13/2019  admission is complete. See EPIC admission navigator for prior to admission medications     Medication history source reliability:Good    Medication history interview source(s):Patient    Medication history resources (including written lists, pill bottles, clinic record):None    Primary pharmacy.[facility]    Additional medication history information not noted on PTA med list :None    Time spent in this activity: 45 minutes    Prior to Admission medications    Medication Sig Last Dose Taking? Auth Provider   allopurinol (ZYLOPRIM) 100 MG tablet TAKE 1 TABLET (100 MG) BY MOUTH DAILY 8/12/2019 at am Yes Pedro Mcclelland MD   aspirin (ASA) 325 MG EC tablet Take 325 mg by mouth daily at 2 pm 7/30/2019 Yes Reported, Patient   cyclobenzaprine (FLEXERIL) 10 MG tablet TAKE 1/2 TO 1 TABLET (5-10 MG) BY MOUTH 3 TIMES DAILY AS NEEDED FOR MUSCLE SPASMS more than a month at prn Yes Pedro Mcclelland MD   gabapentin (NEURONTIN) 300 MG capsule Take 600 mg by mouth 2 times daily (Takes 2 x 300mg capsule = 600mg dose) 8/12/2019 at pm Yes Reported, Patient   losartan (COZAAR) 50 MG tablet Take 1 tablet (50 mg) by mouth daily 8/12/2019 at am Yes Pedro Mcclelland MD   metoprolol succinate ER (TOPROL-XL) 50 MG 24 hr tablet Take 1 tablet (50 mg) by mouth 2 times daily 8/13/2019 at 0730 Yes Pedro Mcclelland MD   multivitamin (CENTRUM SILVER) tablet Take 1 tablet by mouth daily 8/6/2019 Yes Reported, Patient   potassium chloride ER (K-DUR/KLOR-CON M) 10 MEQ CR tablet Take 1 tablet (10 mEq) by mouth daily 8/12/2019 at am Yes Pedro Mclcelland MD   triamcinolone (KENALOG) 0.1 % external cream Apply topically daily as needed for irritation more than a week at prn Yes Pedro Mcclelland MD   triamterene-HCTZ (MAXZIDE-25) 37.5-25 MG tablet TAKE 1 TABLET BY MOUTH DAILY 8/11/2019 Yes Pedro Mcclelland MD   zolpidem (AMBIEN) 5 MG tablet Take 5 mg by mouth At  Bedtime 8/12/2019 at pm Yes Reported, Patient

## 2019-08-14 ENCOUNTER — APPOINTMENT (OUTPATIENT)
Dept: OCCUPATIONAL THERAPY | Facility: CLINIC | Age: 74
DRG: 470 | End: 2019-08-14
Attending: ORTHOPAEDIC SURGERY
Payer: MEDICARE

## 2019-08-14 ENCOUNTER — APPOINTMENT (OUTPATIENT)
Dept: PHYSICAL THERAPY | Facility: CLINIC | Age: 74
DRG: 470 | End: 2019-08-14
Attending: ORTHOPAEDIC SURGERY
Payer: MEDICARE

## 2019-08-14 LAB
GLUCOSE SERPL-MCNC: 130 MG/DL (ref 70–99)
HGB BLD-MCNC: 10.2 G/DL (ref 11.7–15.7)

## 2019-08-14 PROCEDURE — 82947 ASSAY GLUCOSE BLOOD QUANT: CPT | Performed by: ORTHOPAEDIC SURGERY

## 2019-08-14 PROCEDURE — 25800030 ZZH RX IP 258 OP 636: Performed by: ORTHOPAEDIC SURGERY

## 2019-08-14 PROCEDURE — 25000128 H RX IP 250 OP 636: Performed by: ORTHOPAEDIC SURGERY

## 2019-08-14 PROCEDURE — 25000132 ZZH RX MED GY IP 250 OP 250 PS 637: Mod: GY | Performed by: ORTHOPAEDIC SURGERY

## 2019-08-14 PROCEDURE — 97161 PT EVAL LOW COMPLEX 20 MIN: CPT | Mod: GP | Performed by: PHYSICAL THERAPIST

## 2019-08-14 PROCEDURE — 36415 COLL VENOUS BLD VENIPUNCTURE: CPT | Performed by: ORTHOPAEDIC SURGERY

## 2019-08-14 PROCEDURE — 97530 THERAPEUTIC ACTIVITIES: CPT | Mod: GO

## 2019-08-14 PROCEDURE — 97110 THERAPEUTIC EXERCISES: CPT | Mod: GP | Performed by: PHYSICAL THERAPIST

## 2019-08-14 PROCEDURE — 97530 THERAPEUTIC ACTIVITIES: CPT | Mod: GP | Performed by: PHYSICAL THERAPIST

## 2019-08-14 PROCEDURE — 97116 GAIT TRAINING THERAPY: CPT | Mod: GP | Performed by: PHYSICAL THERAPIST

## 2019-08-14 PROCEDURE — 85018 HEMOGLOBIN: CPT | Performed by: ORTHOPAEDIC SURGERY

## 2019-08-14 PROCEDURE — 97165 OT EVAL LOW COMPLEX 30 MIN: CPT | Mod: GO

## 2019-08-14 PROCEDURE — 12000000 ZZH R&B MED SURG/OB

## 2019-08-14 PROCEDURE — 97535 SELF CARE MNGMENT TRAINING: CPT | Mod: GO

## 2019-08-14 RX ORDER — TRIAMTERENE/HYDROCHLOROTHIAZID 37.5-25 MG
1 TABLET ORAL DAILY
Status: CANCELLED | OUTPATIENT
Start: 2019-08-14

## 2019-08-14 RX ORDER — AMOXICILLIN 250 MG
1 CAPSULE ORAL 2 TIMES DAILY
Qty: 40 TABLET | Refills: 0 | Status: SHIPPED | OUTPATIENT
Start: 2019-08-14 | End: 2023-09-29

## 2019-08-14 RX ORDER — OXYCODONE HYDROCHLORIDE 5 MG/1
5-10 TABLET ORAL EVERY 4 HOURS PRN
Qty: 100 TABLET | Refills: 0 | Status: SHIPPED | OUTPATIENT
Start: 2019-08-14 | End: 2023-09-29

## 2019-08-14 RX ADMIN — LOSARTAN POTASSIUM 50 MG: 50 TABLET ORAL at 08:49

## 2019-08-14 RX ADMIN — OXYCODONE HYDROCHLORIDE 5 MG: 5 TABLET ORAL at 19:15

## 2019-08-14 RX ADMIN — MULTIPLE VITAMINS W/ MINERALS TAB 1 TABLET: TAB at 08:49

## 2019-08-14 RX ADMIN — GABAPENTIN 600 MG: 300 CAPSULE ORAL at 08:06

## 2019-08-14 RX ADMIN — SENNOSIDES AND DOCUSATE SODIUM 1 TABLET: 8.6; 5 TABLET ORAL at 21:07

## 2019-08-14 RX ADMIN — POTASSIUM CHLORIDE, DEXTROSE MONOHYDRATE AND SODIUM CHLORIDE: 150; 5; 450 INJECTION, SOLUTION INTRAVENOUS at 01:29

## 2019-08-14 RX ADMIN — OXYCODONE HYDROCHLORIDE 5 MG: 5 TABLET ORAL at 08:06

## 2019-08-14 RX ADMIN — ACETAMINOPHEN 975 MG: 325 TABLET, FILM COATED ORAL at 04:46

## 2019-08-14 RX ADMIN — ACETAMINOPHEN 975 MG: 325 TABLET, FILM COATED ORAL at 13:07

## 2019-08-14 RX ADMIN — ACETAMINOPHEN 975 MG: 325 TABLET, FILM COATED ORAL at 21:07

## 2019-08-14 RX ADMIN — OXYCODONE HYDROCHLORIDE 5 MG: 5 TABLET ORAL at 13:12

## 2019-08-14 RX ADMIN — ALLOPURINOL 100 MG: 100 TABLET ORAL at 08:49

## 2019-08-14 RX ADMIN — GABAPENTIN 600 MG: 300 CAPSULE ORAL at 21:06

## 2019-08-14 RX ADMIN — CYCLOBENZAPRINE HYDROCHLORIDE 5 MG: 5 TABLET, FILM COATED ORAL at 21:07

## 2019-08-14 RX ADMIN — CEFAZOLIN SODIUM 2 G: 2 INJECTION, SOLUTION INTRAVENOUS at 06:20

## 2019-08-14 RX ADMIN — METOPROLOL SUCCINATE 50 MG: 50 TABLET, EXTENDED RELEASE ORAL at 21:07

## 2019-08-14 RX ADMIN — SENNOSIDES AND DOCUSATE SODIUM 1 TABLET: 8.6; 5 TABLET ORAL at 08:49

## 2019-08-14 RX ADMIN — POTASSIUM CHLORIDE 10 MEQ: 10 TABLET, EXTENDED RELEASE ORAL at 08:48

## 2019-08-14 RX ADMIN — ASPIRIN 325 MG: 325 TABLET, DELAYED RELEASE ORAL at 08:48

## 2019-08-14 RX ADMIN — METOPROLOL SUCCINATE 50 MG: 50 TABLET, EXTENDED RELEASE ORAL at 08:49

## 2019-08-14 RX ADMIN — ZOLPIDEM TARTRATE 2.5 MG: 5 TABLET, FILM COATED ORAL at 21:23

## 2019-08-14 ASSESSMENT — ACTIVITIES OF DAILY LIVING (ADL)
ADLS_ACUITY_SCORE: 15
ADLS_ACUITY_SCORE: 20
ADLS_ACUITY_SCORE: 15
ADLS_ACUITY_SCORE: 17
ADLS_ACUITY_SCORE: 20
ADLS_ACUITY_SCORE: 20
PREVIOUS_RESPONSIBILITIES: MEAL PREP;HOUSEKEEPING;LAUNDRY;SHOPPING;MEDICATION MANAGEMENT;FINANCES;DRIVING

## 2019-08-14 NOTE — PLAN OF CARE
Discharge Planner OT   Patient plan for discharge: home today vs tomorrow     Current status: order received, chart reviewed, eval completed, tx initiated. Pt seen POD 1 s/p R KAYLEE, anterior approach, WBAT. Pt lives alone in house and reports IND with ADL/IADL's prior including driving. Pt uses cane for mobility.     Pt required signficant time for bed mobility sit > supine with educated on leg . OT helped pt problem solve bed mobility as she had difficulty with RUE into bed- was able to get into bed with use of leg . Provided handouts on how to use AE and where to obtain. Pt able to don/doff socks and pants with SBA and verbal cues for LE dressing technique. Pt planning to get sock aid and reacher at d/c. Pt ambulated to bathroom with SBA and FWW. SBA for toilet transfer on BSC as pt has RTS at home, educated on safety with use of grab bars and FWW. SBA for toileting tasks and cues for technique.     Barriers to return to prior living situation: pain     Recommendations for discharge: home with family A for cooking, cleaning, laundry, bed mobility, and transportation     Rationale for recommendations: pt making good progress with therapy and reports her daughter and grand-daughter will be able to A as needed at discharge. Pt has appropriate AE for safety in bathroom and for ADL's. Pt planning to obtain a few more items- pt has education/resources on where to obtain.          Entered by: Sushma Fagan 08/14/2019 11:18 AM

## 2019-08-14 NOTE — PLAN OF CARE
Pt A&O x4. VSS on Ra. Up w/ A2 using walker and gait belt. Voiding adequately in BSC. CMS intact ex edema to BLE. Dressing to hip CDI. Pain managed w/ scheduled tylenol. Discharge plan tbd. Will continue to monitor.

## 2019-08-14 NOTE — PROGRESS NOTES
POD# 1    SUBJECTIVE  Minimal pain at rest  Some more pain when moving around  Required assist of two for mobility last night    OBJECTIVE:   /52 (BP Location: Right arm)   Pulse 63   Temp 98.2  F (36.8  C) (Oral)   Resp 19   Ht 1.524 m (5')   Wt 91.6 kg (202 lb)   SpO2 98%   BMI 39.45 kg/m     Hemoglobin   Date Value Ref Range Status   08/14/2019 10.2 (L) 11.7 - 15.7 g/dL Final   ]   Wound: dressing dry      ASSESSMENT   stable    PLAN   KAYLEE pathway  Anticipate discharge home tomorrow if mobility allows    Gt Castillo

## 2019-08-14 NOTE — PROGRESS NOTES
08/14/19 1033   Quick Adds   Type of Visit Initial Occupational Therapy Evaluation   Living Environment   Lives With alone   Living Arrangements house   Transportation Anticipated family or friend will provide   Living Environment Comment grand-daughter will be home initially and then daughter coming for the weekend. tub shower but plans to sponge bath, RTS.    Self-Care   Usual Activity Tolerance good   Current Activity Tolerance moderate   Regular Exercise Yes   Equipment Currently Used at Home cane, straight;raised toilet;grab bar, toilet;grab bar, tub/shower;walker, rolling   Activity/Exercise/Self-Care Comment grab bars in bathtub, RTS, always uses cane, has 2 walkers at home   Functional Level   Ambulation 1-->assistive equipment   Transferring 1-->assistive equipment   Toileting 1-->assistive equipment   Bathing 1-->assistive equipment   Dressing 0-->independent   Eating 0-->independent   Communication 0-->understands/communicates without difficulty   Swallowing 0-->swallows foods/liquids without difficulty   Cognition 0 - no cognition issues reported   Fall history within last six months no   Which of the above functional risks had a recent onset or change? none   Prior Functional Level Comment IND prior with use of cane.    General Information   Onset of Illness/Injury or Date of Surgery - Date 08/13/19   Referring Physician [facility]    Patient/Family Goals Statement return home today    Additional Occupational Profile Info/Pertinent History of Current Problem R KAYLEE    Precautions/Limitations fall precautions   Cognitive Status Examination   Orientation orientation to person, place and time   Level of Consciousness alert   Follows Commands (Cognition) WNL   Memory intact   Cognitive Comment [place]   Visual Perception   Visual Perception Wears glasses   Sensory Examination   Sensory Quick Adds No deficits were identified   Pain Assessment   Patient Currently in Pain Yes, see Vital Sign flowsheet  (5/10)    Range of Motion (ROM)   ROM Quick Adds No deficits were identified   Strength   Manual Muscle Testing Quick Adds No deficits were identified   Muscle Tone Assessment   Muscle Tone Quick Adds No deficits were identified   Coordination   Upper Extremity Coordination No deficits were identified   Mobility   Bed Mobility Bed mobility skill: Supine to sit;Bed mobility skill: Sit to supine   Bed Mobility Skill: Sit to Supine   Level of [place]: Sit/Supine moderate assist (50% patients effort)   Bed Mobility Skill: Supine to Sit   Level of [place]: Supine/Sit moderate assist (50% patients effort)   Transfer Skill: Sit to Stand   Level of [place]: Sit/Stand stand-by assist   Transfer Skill: Sit to Stand weight-bearing as tolerated   Assistive Device for Transfer: Sit/Stand standard walker   Transfer Skill: Toilet Transfer   Level of [place]: Toilet moderate assist (50% patients effort)   Weight-Bearing Restrictions: Toilet weight-bearing as tolerated   Assistive Device standard walker   Upper Body Dressing   Level of [place]: Dress Upper Body independent   Lower Body Dressing   Level of [place]: Dress Lower Body maximum assist (25% patients effort)   Toileting   Level of [place]: Toilet stand-by assist   Grooming   Level of [place]: Grooming independent   Eating/Self Feeding   Level of [place]: Eating independent   Instrumental Activities of Daily Living (IADL)   Previous Responsibilities meal prep;housekeeping;laundry;shopping;medication management;finances;driving   IADL Comments family to A at d/c    Activities of Daily Living Analysis   Impairments Contributing to Impaired Activities of Daily Living balance impaired;pain   General Therapy Interventions   Planned Therapy Interventions ADL retraining;transfer training   Clinical Impression   Criteria for Skilled Therapeutic Interventions Met yes, treatment indicated   OT Diagnosis dec IND with ADL's and transfers   Influenced  "by the following impairments pain   Assessment of Occupational Performance 1-3 Performance Deficits   Identified Performance Deficits LE dressing, toilet transfer, bed mobility    Clinical Decision Making (Complexity) Low complexity   Therapy Frequency Daily   Predicted Duration of Therapy Intervention (days/wks) 2 days   Anticipated Equipment Needs at Discharge reacher;sock aide;other (see comments)  (leg )   Anticipated Discharge Disposition Home with Assist   Risks and Benefits of Treatment have been explained. Yes   Patient, Family & other staff in agreement with plan of care Yes   [facility] TM \"6 Clicks\"   2016, Trustees of [facility], under license to [employer].  All rights reserved.   6 Clicks Short Forms Daily Activity Inpatient Short Form   [facility]  \"6 Clicks\" Daily Activity Inpatient Short Form   1. Putting on and taking off regular lower body clothing? 3 - A Little   2. Bathing (including washing, rinsing, drying)? 3 - A Little   3. Toileting, which includes using toilet, bedpan or urinal? 4 - None   4. Putting on and taking off regular upper body clothing? 4 - None   5. Taking care of personal grooming such as brushing teeth? 4 - None   6. Eating meals? 4 - None   Daily Activity Raw Score (Score out of 24.Lower scores equate to lower levels of function) 22   Total Evaluation Time   Total Evaluation Time (Minutes) 8     "

## 2019-08-14 NOTE — PLAN OF CARE
Discharge Planner PT   Patient plan for discharge: Per [facility] Care Plan: Home without additional services; DIL to stay with pt/work from pt's home, granddtr to assist around work schedule.    Current status: PT orders received, eval completed, treatment initiated. Pt is a 73yo female seen POD#1 direct anterior R KAYLEE, WBAT, no precautions. Pt lives alone in a house with 3 steps to enter with R rail, all needs met on main level. Plans to sponge bathe for a few weeks but has step-in shower, tub, RTS, grab bars toilet.tub. Pt Mod IND with SEC for mobility, also owns two 4WW but doesn't use often. Pt IND with ADLs/IADLs at baseline.    Pt requires [place] for bed mobility, CGA for sit<>Stand to FWW. Pt ambulated 175' with FWW and CGA/SBA. Pt able to ascend/descend 3 stairs x2 with R rail and SEC on L. Participated in KAYLEE exerices and tolerated well. Rates pain 1/10.     Barriers to return to prior living situation: Needs assist with bed mobility and stairs    Recommendations for discharge: Home, use of FWW (DME entered), home exercise program, assist on stairs and in/out of bed    Rationale for recommendations: Pt moving fairly well on POD1, pt reports family plans to stay with pt for first few days and have flexibility to continue if needed, family able to assist with transportation, IADLs, stairs. Anticipate safe disch to home with family with use of FWW for mobility and continued KAYLEE home exercise program.       Entered by: Flor Zaragoza 08/14/2019 9:46 AM

## 2019-08-14 NOTE — PROGRESS NOTES
08/14/19 0800   Quick Adds   Type of Visit Initial PT Evaluation   Living Environment   Lives With alone   Living Arrangements house   Home Accessibility stairs to enter home   Number of Stairs, Main Entrance 3   Stair Railings, Main Entrance railing on right side (ascending)   Transportation Anticipated family or friend will provide;car, drives self  (pt drives; family can assist)   Living Environment Comment Has basement laundry but family able to assist   Self-Care   Usual Activity Tolerance good   Current Activity Tolerance moderate   Regular Exercise Yes   Activity/Exercise Type walking   Exercise Amount/Frequency 20 mins;3-5 times/wk   Equipment Currently Used at Home cane, straight;raised toilet;grab bar, toilet;grab bar, tub/shower;walker, rolling  (has two 4WWs)   Functional Level Prior   Ambulation 1-->assistive equipment   Transferring 1-->assistive equipment   Toileting 1-->assistive equipment   Bathing 0-->independent   Fall history within last six months no   Which of the above functional risks had a recent onset or change? ambulation;transferring   Prior Functional Level Comment Pt lives alone in a house with 3 steps to enter with R rail, all needs met on main level. Plans to sponge bathe for a few weeks but has step-in shower, tub, RTS, grab bars toilet.tub. Pt Mod IND with SEC for mobility, also owns two 4WW but doesn't use often. Pt IND with ADLs/IADLs at baseline.   General Information   Onset of Illness/Injury or Date of Surgery - Date 08/13/19   Referring Physician Gt Castillo MD   Patient/Family Goals Statement Per [facility] Care Plan: Home without additional services; DIL to stay with pt/work from pt's home, granddtr to assist around work schedule.   Pertinent History of Current Problem (include personal factors and/or comorbidities that impact the POC)  Pt is a 75yo female seen POD#1 direct anterior R KAYLEE, WBAT, no precautions.   Precautions/Limitations fall precautions   Weight-Bearing  Status - RLE weight-bearing as tolerated   General Observations Pt sitting up on BSC with nursing present upon PT entry; pt with IV   General Info Comments Activity: ambulate with assistance 3 times daily, OOB DOS if pt tolerates, up to chair for all meals   Cognitive Status Examination   Orientation orientation to person, place and time   Level of Consciousness alert   Follows Commands and Answers Questions 100% of the time;able to follow single-step instructions   Personal Safety and Judgment intact   Pain Assessment   Patient Currently in Pain No  (denies at rest, 1/10 with activity)   Integumentary/Edema   Integumentary/Edema Comments R hip incision covered with dressing, has 1 to 2+ BLE edema which pt and chart report baseline   Posture    Posture Forward head position;Protracted shoulders   Range of Motion (ROM)   ROM Comment RLE limited by pain, LLE WNL with AROM   Strength   Strength Comments RLE limited by pain, unable to perform LAQ on R but able to perform good quad set and SAQ; BLE WFL for mobility wiht AD   Bed Mobility   Bed Mobility Comments [place] supine>sit   Transfer Skills   Transfer Comments CGA sit>stand to FWW   Gait   Gait Comments CGA 10' with FWW, 3pt gait pattern , WBAT   Balance   Balance Comments Good sitting balance, good static staance wthi BUE support   Sensory Examination   Sensory Perception Comments Denies new N/T   General Therapy Interventions   Planned Therapy Interventions bed mobility training;gait training;ROM;strengthening;transfer training;stretching;progressive activity/exercise;home program guidelines   Clinical Impression   Criteria for Skilled Therapeutic Intervention yes, treatment indicated   PT Diagnosis Impaired gait   Influenced by the following impairments Weakness, decreased ROM, decreased balance, pain with mobility   Functional limitations due to impairments Decreased safety and IND with functional transfers, gait, stairs   Clinical Presentation  "Stable/Uncomplicated   Clinical Presentation Rationale clinically improving   Clinical Decision Making (Complexity) Low complexity   Therapy Frequency 2x/day   Predicted Duration of Therapy Intervention (days/wks) 3 days   Anticipated Equipment Needs at Discharge front wheeled walker   Anticipated Discharge Disposition Home with Assist   Risk & Benefits of therapy have been explained Yes   Patient, Family & other staff in agreement with plan of care Yes   [facility] TM \"6 Clicks\"   2016, Trustees of [facility], under license to [employer].  All rights reserved.   6 Clicks Short Forms Basic Mobility Inpatient Short Form   [facility]  \"6 Clicks\" V.2 Basic Mobility Inpatient Short Form   1. Turning from your back to your side while in a flat bed without using bedrails? 4 - None   2. Moving from lying on your back to sitting on the side of a flat bed without using bedrails? 3 - A Little   3. Moving to and from a bed to a chair (including a wheelchair)? 3 - A Little   4. Standing up from a chair using your arms (e.g., wheelchair, or bedside chair)? 3 - A Little   5. To walk in hospital room? 3 - A Little   6. Climbing 3-5 steps with a railing? 3 - A Little   Basic Mobility Raw Score (Score out of 24.Lower scores equate to lower levels of function) 19   Total Evaluation Time   Total Evaluation Time (Minutes) 10     "

## 2019-08-14 NOTE — PLAN OF CARE
Patient here d/t R total hip replacement anterior approach, VSS, A&Ox4, LS clear, BS hypoactive, On clear liquids, denies nausea, Continent for both B and B, Voids using the commode, With numbness and tingling on both hands PTA, Dressing of incision clean dry and intact, Up with heavy assist of 2, pain managed with Oxycodone. Discharge plan TBD.

## 2019-08-14 NOTE — PLAN OF CARE
Pt progressing per plan of care. Up with one assist. Pain managed with oxycodone and tylenol. Pt is [place], doesn't have her hearing aids with her.Planning to discharge to home tomorrow

## 2019-08-15 ENCOUNTER — APPOINTMENT (OUTPATIENT)
Dept: OCCUPATIONAL THERAPY | Facility: CLINIC | Age: 74
DRG: 470 | End: 2019-08-15
Attending: ORTHOPAEDIC SURGERY
Payer: MEDICARE

## 2019-08-15 ENCOUNTER — APPOINTMENT (OUTPATIENT)
Dept: PHYSICAL THERAPY | Facility: CLINIC | Age: 74
DRG: 470 | End: 2019-08-15
Attending: ORTHOPAEDIC SURGERY
Payer: MEDICARE

## 2019-08-15 VITALS
DIASTOLIC BLOOD PRESSURE: 43 MMHG | HEART RATE: 80 BPM | OXYGEN SATURATION: 96 % | BODY MASS INDEX: 39.66 KG/M2 | WEIGHT: 202 LBS | SYSTOLIC BLOOD PRESSURE: 114 MMHG | RESPIRATION RATE: 16 BRPM | HEIGHT: 60 IN | TEMPERATURE: 98.8 F

## 2019-08-15 LAB
GLUCOSE BLDC GLUCOMTR-MCNC: 100 MG/DL (ref 70–99)
HGB BLD-MCNC: 8.8 G/DL (ref 11.7–15.7)

## 2019-08-15 PROCEDURE — 85018 HEMOGLOBIN: CPT | Performed by: ORTHOPAEDIC SURGERY

## 2019-08-15 PROCEDURE — 25000132 ZZH RX MED GY IP 250 OP 250 PS 637: Mod: GY | Performed by: ORTHOPAEDIC SURGERY

## 2019-08-15 PROCEDURE — 97116 GAIT TRAINING THERAPY: CPT | Mod: GP

## 2019-08-15 PROCEDURE — 36415 COLL VENOUS BLD VENIPUNCTURE: CPT | Performed by: ORTHOPAEDIC SURGERY

## 2019-08-15 PROCEDURE — 97535 SELF CARE MNGMENT TRAINING: CPT | Mod: GO | Performed by: OCCUPATIONAL THERAPY ASSISTANT

## 2019-08-15 PROCEDURE — 00000146 ZZHCL STATISTIC GLUCOSE BY METER IP

## 2019-08-15 RX ADMIN — OXYCODONE HYDROCHLORIDE 5 MG: 5 TABLET ORAL at 06:29

## 2019-08-15 RX ADMIN — ACETAMINOPHEN 975 MG: 325 TABLET, FILM COATED ORAL at 13:36

## 2019-08-15 RX ADMIN — METOPROLOL SUCCINATE 50 MG: 50 TABLET, EXTENDED RELEASE ORAL at 08:20

## 2019-08-15 RX ADMIN — MULTIPLE VITAMINS W/ MINERALS TAB 1 TABLET: TAB at 08:20

## 2019-08-15 RX ADMIN — POTASSIUM CHLORIDE 10 MEQ: 10 TABLET, EXTENDED RELEASE ORAL at 08:20

## 2019-08-15 RX ADMIN — ALLOPURINOL 100 MG: 100 TABLET ORAL at 08:20

## 2019-08-15 RX ADMIN — ACETAMINOPHEN 975 MG: 325 TABLET, FILM COATED ORAL at 06:26

## 2019-08-15 RX ADMIN — GABAPENTIN 600 MG: 300 CAPSULE ORAL at 08:30

## 2019-08-15 RX ADMIN — LOSARTAN POTASSIUM 50 MG: 50 TABLET ORAL at 08:20

## 2019-08-15 RX ADMIN — ASPIRIN 325 MG: 325 TABLET, DELAYED RELEASE ORAL at 08:20

## 2019-08-15 RX ADMIN — OXYCODONE HYDROCHLORIDE 5 MG: 5 TABLET ORAL at 01:09

## 2019-08-15 RX ADMIN — SENNOSIDES AND DOCUSATE SODIUM 1 TABLET: 8.6; 5 TABLET ORAL at 08:20

## 2019-08-15 RX ADMIN — OXYCODONE HYDROCHLORIDE 5 MG: 5 TABLET ORAL at 10:50

## 2019-08-15 ASSESSMENT — ACTIVITIES OF DAILY LIVING (ADL)
ADLS_ACUITY_SCORE: 20

## 2019-08-15 NOTE — PLAN OF CARE
Date/Time 8/15/19 NOC    Trauma/Ortho/Medical (Choose one) ortho    Diagnosis: R Ant Hip  POD#:2  Mental Status: A&Ox4  Activity/dangle Up A2  Diet: regular  Pain: tylenol and PRN oxycodone  Lopez/Voiding: BSC  Tele/Restraints/Iso:  02/LDA: IVSL  D/C Date: 08/15  Other Info:    Dressing CDI, CMS intact.

## 2019-08-15 NOTE — PLAN OF CARE
Discharge Planner PT   Patient plan for discharge: Per [facility] Care Plan: Home without additional services; DIL to stay with pt/work from pt's home, granddtr to assist around work schedule.     Current status: STS x 3 with SBA and no verbal cues needed. Pt ambulated 120 ft, then another 30 ft using RW and SBA for safety. Pt with very slow kena, step to gait pattern. Pt went up/down 3 steps using R rail and std cane on the other side with CGA when ascending stairs and CGA-min assist x 1 with descending stairs. Pt with good safety awareness.    Barriers to return to prior living situation: Needs assist with bed mobility and stair  Recommendations for discharge: Home, use of FWW (DME entered), home exercise program, assist on stairs and in/out of bed  Rationale for recommendations:  Anticipate safe disch to home with family with use of FWW for mobility and continued KAYLEE home exercise program. Will need assist of family with bed mobility and stair management.        Entered by: Maine Wong 08/15/2019 9:55 AM     Physical Therapy Discharge Summary    Reason for therapy discharge:    Discharged to Home with assist of family    Progress towards therapy goal(s). See goals on Care Plan in [facility] electronic health record for goal details.  Goals partially met.  Barriers to achieving goals:   discharge from facility.    Therapy recommendation(s):    No further therapy is recommended.

## 2019-08-15 NOTE — PLAN OF CARE
Pt a&o, VSS on RA , IVSL, up with 1 to 2, WB as tolerated, voiding adequately in BSC, controlling pain with oxycodone and flexeril and scheduled tylenol, regular diet, CMS intact, dressing CDI, +2 to +3 edema on lower extremities, continue to monitor.

## 2019-08-15 NOTE — PLAN OF CARE
Discharge Planner OT   Patient plan for discharge: home today vs tomorrow      Current status:  pt sitting up in chair already dressed. Reviewed AE for LE dressing, pt stated she feels good with being able to use AE to don pants, will not wear socks, pt has RTS for toileting and feels good with transfer, pt stated granddaughter is having trouble finding AE for dressing in the community, educated on online resources for AE needs, pt verbalized good understanding, pt may want AE from here prior to discharge pt will let OT know prior to discharge.      Barriers to return to prior living situation: pain      Recommendations for discharge: home with family A for cooking, cleaning, laundry, bed mobility, and transportation per plan established by the Occupational Therapist     Rationale for recommendations: pt making good progress with therapy and reports her daughter and grand-daughter will be able to A as needed at discharge. Pt has appropriate AE for safety in bathroom and for ADL's. Pt planning to obtain a few more items for ADLS       Entered by: Kacy Villa 08/15/2019 9:26 AM

## 2019-08-15 NOTE — PLAN OF CARE
Pt progressing per plan of care. Pain managed with po oxycodone. Up with one assist. Dressing on (R) hip changed. Discharge instructions reviewed and all questions answered. Discharging home with daughter in law.

## 2019-08-16 ENCOUNTER — TELEPHONE (OUTPATIENT)
Dept: FAMILY MEDICINE | Facility: CLINIC | Age: 74
End: 2019-08-16

## 2019-08-16 NOTE — PLAN OF CARE
Occupational Therapy Discharge Summary    Reason for therapy discharge:    Discharged to home.    Progress towards therapy goal(s). See goals on Care Plan in [facility] electronic health record for goal details.  Goals partially met.  Barriers to achieving goals:   discharge from facility.    Therapy recommendation(s):    No further therapy is recommended.home with family A for cooking, cleaning, laundry, bed mobility, and transportation

## 2019-08-16 NOTE — TELEPHONE ENCOUNTER
"  ED for acute condition Discharge Protocol    \"Hi, my name is Kalpana Neal, a registered nurse, and I am calling from [facility].  I am calling to follow up and see how things are going for you after your recent emergency visit.\"    Tell me how you are doing now that you are home?\" doing OK, walking about a bit, pain is tolerable, dressing will be changed in 48 hours, has family helping. Slept good last night, but did take ambien, eating and drinking in adequate amounts      Discharge Instructions    \"Let's review your discharge instructions.  What is/are the follow-up recommendations?  Pt. Response: yes    \"Has an appointment with your primary care provider been scheduled?\"  pt will, but will see Dr. Castillo 1st then will make appointment with Dr Mcclelland    Medications    \"Tell me what changed about your medicines when you discharged?\"    Pain med    \"What questions do you have about your medications?\"   None        Call Summary    \"What questions or concerns do you have about your recent visit and your follow-up care?\"     none    \"If you have questions or things don't continue to improve, we encourage you contact us through the main clinic number (give number).  Even if the clinic is not open, triage nurses are available 24/7 to help you.     We would like you to know that our clinic has extended hours (provide information).  We also have urgent care (provide details on closest location and hours/contact info)\"    \"Thank you for your time and take care!\"      Kalpana Neal RN   [facility]                "

## 2019-08-16 NOTE — TELEPHONE ENCOUNTER
Routing to RN triage pool to help schedule an appointment for a hospital discharge follow up.  Thanks,  Elli Mckay

## 2019-08-19 NOTE — DISCHARGE SUMMARY
[facility]    Discharge Summary  Orthopedics    Date of Admission:  8/13/2019  Date of Discharge:  8/15/2019  3:32 PM  Discharging Provider: Christian Quintanilla PA-C  Date of Service: 8/15/2019    Discharge Diagnoses   Status post total hip replacement, right    Procedure/Surgery Information   Procedure: Procedure(s):  RIGHT DIRECT ANTERIOR TOTAL HIP ARTHROPLASTY   Surgeon(s): Surgeon(s) and Role:     * Gt Castillo MD - Primary     * Christian Quintanilla PA-C - Assisting           History of Present Illness   Kyler Whitlock is a 74 year old female who presented with significant pain and degenerative changes in her right hip.    Hospital Course   Kyler Whitlock was admitted on 8/13/2019.  The following problems were addressed during her hospitalization:  Active Problems:    Status post total hip replacement, right      Post-operative pain control: included oxycodone and will be oxycodone on discharge.     Medications discontinued or adjusted during this hospitalization:  Start aspirin 325 mg daily for 6 weeks for DVT prophylaxis.    Antibiotics prescribed at discharge: None prescribed     Christian Quintanilla PA-C    Discharge Disposition   Discharged to home   Condition at discharge: Good    Unresulted Labs Ordered in the Past 30 Days of this Admission     No orders found from 7/14/2019 to 8/14/2019.          Primary Care Physician   Pedro Mcclelland    Consultations This Hospital Stay   OCCUPATIONAL THERAPY ADULT IP CONSULT  PHYSICAL THERAPY ADULT IP CONSULT    Time Spent on this Encounter   I have spent less than 30 minutes on this discharge.    Discharge Orders      Reason for your hospital stay    Diagnosis: DJD Hip   Procedure: KAYLEE  Surgeon: Gt Castillo MD  Patient underwent total hip replacement without complication. Patient's hospital stay included physical therapy and DVT prophylaxis. Patient will follow-up in the office 10-14 days post-op for wound  check. Please refer to chart for any other specifics of this hospital stay.    Aman Quintanilla PA-C     Follow-up and recommended labs and tests     You will follow up with Dr. Castillo or his physician assistant Aman Quintanilla in 2 weeks after surgery.  Your recovery process and incision will be checked.     Activity    Your activity upon discharge: activity as tolerated     Wound care and dressings    Instructions to care for your wound at home: Your dressing will be changed before you leave the hospital.  Leave that dressing in place for 48 hours.  You may shower at that time, allowing water to run over the incision.  The steri-strips will eventually fall off, do not remove these until that time.  You may re-apply a clean dressing after the shower.  You may perform daily dressing changes at that point, keeping the incision clean and dry.    Do not allow the dressing to soak in water.  If the dressing becomes completely saturated, you may remove it and perform basic wound management by keeping the wound clean, dry, and covered.    Notify Dr. Castillo's office if the dressing becomes completely saturated from drainage or blood from the wound, or if the wound or dressing is leaking..     Full Code     Diet    Follow this diet upon discharge: Orders Placed This Encounter      Advance Diet as Tolerated: Regular Diet Adult     Discharge Medications   Start aspirin 325 mg daily for 6 weeks for DVT prophylaxis.  Discharge Medication List as of 8/15/2019  2:18 PM      START taking these medications    Details   order for DME Equipment being ordered: Walker Wheels () and Walker ()  Treatment Diagnosis: Impaired gaitDisp-1 each, R-0, Local Print      oxyCODONE (ROXICODONE) 5 MG tablet Take 1-2 tablets (5-10 mg) by mouth every 4 hours as needed, Disp-100 tablet, R-0, Local Print      senna-docusate (SENOKOT-S/PERICOLACE) 8.6-50 MG tablet Take 1 tablet by mouth 2 times daily, Disp-40 tablet, R-0, E-Prescribe          CONTINUE these medications which have NOT CHANGED    Details   allopurinol (ZYLOPRIM) 100 MG tablet TAKE 1 TABLET (100 MG) BY MOUTH DAILY, Disp-90 tablet, R-3, E-Prescribe      aspirin (ASA) 325 MG EC tablet Take 325 mg by mouth daily at 2 pm, Historical      cyclobenzaprine (FLEXERIL) 10 MG tablet TAKE 1/2 TO 1 TABLET (5-10 MG) BY MOUTH 3 TIMES DAILY AS NEEDED FOR MUSCLE SPASMS, Disp-30 tablet, R-2, E-Prescribe      gabapentin (NEURONTIN) 300 MG capsule Take 600 mg by mouth 2 times daily (Takes 2 x 300mg capsule = 600mg dose), Historical      losartan (COZAAR) 50 MG tablet Take 1 tablet (50 mg) by mouth daily, Disp-90 tablet, R-3, E-Prescribe      metoprolol succinate ER (TOPROL-XL) 50 MG 24 hr tablet Take 1 tablet (50 mg) by mouth 2 times daily, Disp-180 tablet, R-3, E-Prescribe      multivitamin (CENTRUM SILVER) tablet Take 1 tablet by mouth daily, Historical      potassium chloride ER (K-DUR/KLOR-CON M) 10 MEQ CR tablet Take 1 tablet (10 mEq) by mouth daily, Disp-30 tablet, R-11, E-Prescribe      triamcinolone (KENALOG) 0.1 % external cream Apply topically daily as needed for irritationDisp-30 g, [id number]      triamterene-HCTZ (MAXZIDE-25) 37.5-25 MG tablet TAKE 1 TABLET BY MOUTH DAILY, Disp-90 tablet, R-1, E-Prescribe      zolpidem (AMBIEN) 5 MG tablet Take 5 mg by mouth At Bedtime, Historical           Allergies   No Known Allergies  Data   Results for orders placed or performed during the hospital encounter of 08/13/19   XR Surgery MUNDO L/T 5 Min Fluoro w Stills    Narrative    SURGERY C-ARM FLUOROSCOPY LESS THAN FIVE MINUTES WITH STILLS    8/13/2019 3:00 PM     HISTORY: Right anterior total hip, OR 33, seven images, 0.8 minutes  fluoroscopy time.    NUMBER OF IMAGES ACQUIRED: 7    VIEWS: 1    FLUOROSCOPY TIME: 0.8 minutes.      Impression    IMPRESSION: Images obtained during total hip arthroplasty. Excellent  position and alignment of components on the last image with no  evidence of  complication.    MARY DOWLING MD   XR Pelvis w Hip Port Right 1 View    Narrative    PELVIS AD HIP PORTABLE RIGHT ONE VIEW   8/13/2019 4:52 PM     HISTORY: Status post right KAYLEE.    Comparison: Intraoperative spot views from today.      Impression    IMPRESSION: New right total hip arthroplasty. Excellent position and  alignment of components. No evidence of complication.    MARY DOWLING MD     Hemoglobin   Date Value Ref Range Status   08/15/2019 8.8 (L) 11.7 - 15.7 g/dL Final   08/14/2019 10.2 (L) 11.7 - 15.7 g/dL Final   ]  Last Basic Metabolic Panel:  Lab Results   Component Value Date     08/09/2019      Lab Results   Component Value Date    POTASSIUM 3.5 08/13/2019     Lab Results   Component Value Date    CHLORIDE 104 08/09/2019     Lab Results   Component Value Date    RICO 9.2 08/09/2019     Lab Results   Component Value Date    CO2 27 08/09/2019     Lab Results   Component Value Date    BUN 20 08/09/2019     Lab Results   Component Value Date    CR 0.73 08/09/2019     Lab Results   Component Value Date     08/14/2019

## 2019-08-21 ENCOUNTER — TELEPHONE (OUTPATIENT)
Dept: FAMILY MEDICINE | Facility: CLINIC | Age: 74
End: 2019-08-21

## 2019-08-21 NOTE — TELEPHONE ENCOUNTER
Pt was informed via mail that her K+ was normal and received a second letter that it was low and she should be taking a K+ supplement. Pt thought she was already on a K+ supplement. Please advise pt.      Pt can be reached @ [phone] yahaira

## 2019-08-21 NOTE — TELEPHONE ENCOUNTER
Potassium is now corrected; please continue supplement. Hope she's having a good recovery.  Thanks Pedro

## 2019-08-21 NOTE — TELEPHONE ENCOUNTER
Spoke with pt, detailed message given, she verbalized understanding    Kalpana Neal RN   [facility]

## 2019-08-21 NOTE — TELEPHONE ENCOUNTER
Dr Mcclelland    See pt message    Last K+ results  4.2-8/7/19  3.3-8/9/19  3.5-8/13/19    Advise     Kalpana Neal RN   [facility][note 3]

## 2019-09-29 DIAGNOSIS — G47.00 INSOMNIA, UNSPECIFIED TYPE: Primary | ICD-10-CM

## 2019-09-29 NOTE — TELEPHONE ENCOUNTER
"Requested Prescriptions   Pending Prescriptions Disp Refills     metoprolol succinate ER (TOPROL-XL) 50 MG 24 hr tablet [Pharmacy Med Name: METOPROLOL SUCC ER 50 MG TAB] 90 tablet 3     Sig: TAKE 1 TABLET BY MOUTH EVERY DAY  Last Written Prescription Date:  09/24/2018  Last Fill Quantity: 90,  # refills: 3   Last office visit: No previous visit found with prescribing provider:  04/29/2019   Future Office Visit:         Beta-Blockers Protocol Passed - 4/29/2019  8:00 PM        Passed - Blood pressure under 140/90 in past 12 months     BP Readings from Last 3 Encounters:   04/29/19 138/82   12/12/18 130/60   12/05/18 118/72                 Passed - Patient is age 6 or older        Passed - Recent (12 mo) or future (30 days) visit within the authorizing provider's specialty     Patient had office visit in the last 12 months or has a visit in the next 30 days with authorizing provider or within the authorizing provider's specialty.  See \"Patient Info\" tab in inbasket, or \"Choose Columns\" in Meds & Orders section of the refill encounter.              Passed - Medication is active on med list        " complains of pain/discomfort

## 2019-09-30 NOTE — TELEPHONE ENCOUNTER
Controlled Substance Refill Request for ZOLPIDEM TARTRATE 5 MG TABLET  Problem List Complete:  No     PROVIDER TO CONSIDER COMPLETION OF PROBLEM LIST AND OVERVIEW/CONTROLLED SUBSTANCE AGREEMENT    Last Written Prescription Date:    Last Fill Quantity: ,   # refills:     THE MOST RECENT OFFICE VISIT MUST BE WITHIN THE PAST 3 MONTHS. AT LEAST ONE FACE TO FACE VISIT MUST OCCUR EVERY 6 MONTHS. ADDITIONAL VISITS CAN BE VIRTUAL.  (THIS STATEMENT SHOULD BE DELETED.)    Last Office Visit with [facility] primary care provider: 08/07/2019    Future Office visit:     Controlled substance agreement:   Encounter-Level CSA:    There are no encounter-level csa.     Patient-Level CSA:    There are no patient-level csa.         Last Urine Drug Screen: No results found for: CDAUT, No results found for: COMDAT, No results found for: THC13, PCP13, COC13, MAMP13, OPI13, AMP13, BZO13, TCA13, MTD13, BAR13, OXY13, PPX13, BUP13     Processing:  Fax Rx to [facility] pharmacy     [email or web address]       checked in past 3 months?  No, route to RN

## 2019-10-01 NOTE — TELEPHONE ENCOUNTER
Dr. Mcclelland,    Please review/sign or advise for refill of zolpidem (AMBIEN) 5 MG tablet.    : Zolpidem Tartrate 5 Mg Tablet last filled on 07/15/19 for #60.

## 2019-10-02 RX ORDER — ZOLPIDEM TARTRATE 5 MG/1
TABLET ORAL
Qty: 60 TABLET | Refills: 0 | Status: SHIPPED | OUTPATIENT
Start: 2019-10-02 | End: 2019-11-20

## 2019-10-08 DIAGNOSIS — G62.9 PERIPHERAL POLYNEUROPATHY: ICD-10-CM

## 2019-10-08 RX ORDER — GABAPENTIN 300 MG/1
CAPSULE ORAL
Qty: 360 CAPSULE | Refills: 1 | Status: SHIPPED | OUTPATIENT
Start: 2019-10-08 | End: 2019-12-06 | Stop reason: DRUGHIGH

## 2019-10-08 NOTE — TELEPHONE ENCOUNTER
Dr. Mcclelland;  Routing refill request to provider for review/approval because:  Drug not on the [facility] refill protocol     : last dispensed 07/13/2019 #360/90 days prescribed by Dr. Mcclelland    Thank You!  Navya Vickers, TORRES  Triage Nurse

## 2019-10-08 NOTE — TELEPHONE ENCOUNTER
Controlled Substance Refill Request for GABAPENTIN 300 MG CAPSULE  Problem List Complete:  No     PROVIDER TO CONSIDER COMPLETION OF PROBLEM LIST AND OVERVIEW/CONTROLLED SUBSTANCE AGREEMENT    Last Written Prescription Date:    Last Fill Quantity: ,   # refills:     THE MOST RECENT OFFICE VISIT MUST BE WITHIN THE PAST 3 MONTHS. AT LEAST ONE FACE TO FACE VISIT MUST OCCUR EVERY 6 MONTHS. ADDITIONAL VISITS CAN BE VIRTUAL.  (THIS STATEMENT SHOULD BE DELETED.)    Last Office Visit with [facility] primary care provider: 08/07/2019    Future Office visit:     Controlled substance agreement:   Encounter-Level CSA:    There are no encounter-level csa.     Patient-Level CSA:    There are no patient-level csa.         Last Urine Drug Screen: No results found for: CDAUT, No results found for: COMDAT, No results found for: THC13, PCP13, COC13, MAMP13, OPI13, AMP13, BZO13, TCA13, MTD13, BAR13, OXY13, PPX13, BUP13     Processing:  Fax Rx to [facility]     [email or web address]       checked in past 3 months?  No, route to RN

## 2019-10-17 DIAGNOSIS — G62.9 PERIPHERAL POLYNEUROPATHY: Primary | ICD-10-CM

## 2019-10-17 NOTE — TELEPHONE ENCOUNTER
Per pharmacy- Patient is looking for a new script for gabapentin 600mg BID Please send if authorized. Thanks and have a great day!

## 2019-10-17 NOTE — TELEPHONE ENCOUNTER
gabapentin (NEURONTIN)      Last Written Prescription Date:  10/8/19  Last Fill Quantity: 360,   # refills: 1  Last Office Visit: 8/7/19  Future Office visit:       Routing refill request to provider for review/approval because:  Drug not on the [facility], P or [facility] refill protocol or controlled substance

## 2019-10-18 RX ORDER — GABAPENTIN 300 MG/1
600 CAPSULE ORAL 2 TIMES DAILY
Qty: 360 CAPSULE | Refills: 3 | Status: SHIPPED | OUTPATIENT
Start: 2019-10-18 | End: 2019-12-06 | Stop reason: DRUGHIGH

## 2019-11-20 DIAGNOSIS — G47.00 INSOMNIA, UNSPECIFIED TYPE: ICD-10-CM

## 2019-11-20 NOTE — TELEPHONE ENCOUNTER
Controlled Substance Refill Request for ZOLPIDEM TARTRATE 5 MG TABLET  Problem List Complete:  No     PROVIDER TO CONSIDER COMPLETION OF PROBLEM LIST AND OVERVIEW/CONTROLLED SUBSTANCE AGREEMENT    Last Written Prescription Date:  10/02/2019  Last Fill Quantity: 60,   # refills: 0    THE MOST RECENT OFFICE VISIT MUST BE WITHIN THE PAST 3 MONTHS. AT LEAST ONE FACE TO FACE VISIT MUST OCCUR EVERY 6 MONTHS. ADDITIONAL VISITS CAN BE VIRTUAL.  (THIS STATEMENT SHOULD BE DELETED.)    Last Office Visit with [facility] primary care provider: 08/07/2019    Future Office visit:     Controlled substance agreement:   Encounter-Level CSA:    There are no encounter-level csa.     Patient-Level CSA:    There are no patient-level csa.         Last Urine Drug Screen: No results found for: CDAUT, No results found for: COMDAT, No results found for: THC13, PCP13, COC13, MAMP13, OPI13, AMP13, BZO13, TCA13, MTD13, BAR13, OXY13, PPX13, BUP13     Processing:  Fax Rx to [facility] pharmacy     [email or web address]       checked in past 3 months?  No, route to RN

## 2019-11-21 NOTE — TELEPHONE ENCOUNTER
Routing refill request to provider for review/approval because:  Drug not on the [facility], P or [facility] refill protocol or controlled substance     Checked:  LAST Filled 10/2/19, 60 tablets, 0 refills by Dr. Mcclelland.

## 2019-11-22 RX ORDER — ZOLPIDEM TARTRATE 5 MG/1
TABLET ORAL
Qty: 60 TABLET | Refills: 0 | Status: SHIPPED | OUTPATIENT
Start: 2019-11-22 | End: 2020-01-29

## 2019-12-06 ENCOUNTER — OFFICE VISIT (OUTPATIENT)
Dept: FAMILY MEDICINE | Facility: CLINIC | Age: 74
End: 2019-12-06
Payer: MEDICARE

## 2019-12-06 VITALS
BODY MASS INDEX: 40.89 KG/M2 | HEIGHT: 60 IN | TEMPERATURE: 97.6 F | RESPIRATION RATE: 14 BRPM | SYSTOLIC BLOOD PRESSURE: 110 MMHG | OXYGEN SATURATION: 98 % | HEART RATE: 60 BPM | DIASTOLIC BLOOD PRESSURE: 80 MMHG | WEIGHT: 208.3 LBS

## 2019-12-06 DIAGNOSIS — M1A.2790 DRUG-INDUCED CHRONIC GOUT OF FOOT WITHOUT TOPHUS, UNSPECIFIED LATERALITY: ICD-10-CM

## 2019-12-06 DIAGNOSIS — I10 ESSENTIAL HYPERTENSION WITH GOAL BLOOD PRESSURE LESS THAN 140/90: ICD-10-CM

## 2019-12-06 DIAGNOSIS — M17.0 OSTEOARTHRITIS OF BOTH KNEES, UNSPECIFIED OSTEOARTHRITIS TYPE: ICD-10-CM

## 2019-12-06 DIAGNOSIS — E87.6 HYPOKALEMIA: ICD-10-CM

## 2019-12-06 LAB
ALBUMIN SERPL-MCNC: 3.6 G/DL (ref 3.4–5)
ALP SERPL-CCNC: 111 U/L (ref 40–150)
ALT SERPL W P-5'-P-CCNC: 22 U/L (ref 0–50)
ANION GAP SERPL CALCULATED.3IONS-SCNC: 6 MMOL/L (ref 3–14)
AST SERPL W P-5'-P-CCNC: 17 U/L (ref 0–45)
BILIRUB SERPL-MCNC: 0.3 MG/DL (ref 0.2–1.3)
BUN SERPL-MCNC: 32 MG/DL (ref 7–30)
CALCIUM SERPL-MCNC: 9.5 MG/DL (ref 8.5–10.1)
CHLORIDE SERPL-SCNC: 106 MMOL/L (ref 94–109)
CHOLEST SERPL-MCNC: 222 MG/DL
CO2 SERPL-SCNC: 28 MMOL/L (ref 20–32)
CREAT SERPL-MCNC: 0.8 MG/DL (ref 0.52–1.04)
ERYTHROCYTE [DISTWIDTH] IN BLOOD BY AUTOMATED COUNT: 13.9 % (ref 10–15)
GFR SERPL CREATININE-BSD FRML MDRD: 71 ML/MIN/{1.73_M2}
GLUCOSE SERPL-MCNC: 84 MG/DL (ref 70–99)
HCT VFR BLD AUTO: 39.8 % (ref 35–47)
HDLC SERPL-MCNC: 65 MG/DL
HGB BLD-MCNC: 12.8 G/DL (ref 11.7–15.7)
LDLC SERPL CALC-MCNC: 139 MG/DL
MCH RBC QN AUTO: 28.7 PG (ref 26.5–33)
MCHC RBC AUTO-ENTMCNC: 32.2 G/DL (ref 31.5–36.5)
MCV RBC AUTO: 89 FL (ref 78–100)
NONHDLC SERPL-MCNC: 157 MG/DL
PLATELET # BLD AUTO: 277 10E9/L (ref 150–450)
POTASSIUM SERPL-SCNC: 4.6 MMOL/L (ref 3.4–5.3)
PROT SERPL-MCNC: 7.2 G/DL (ref 6.8–8.8)
RBC # BLD AUTO: 4.46 10E12/L (ref 3.8–5.2)
SODIUM SERPL-SCNC: 140 MMOL/L (ref 133–144)
TRIGL SERPL-MCNC: 89 MG/DL
URATE SERPL-MCNC: 4.7 MG/DL (ref 2.6–6)
WBC # BLD AUTO: 7.5 10E9/L (ref 4–11)

## 2019-12-06 PROCEDURE — 80053 COMPREHEN METABOLIC PANEL: CPT | Performed by: FAMILY MEDICINE

## 2019-12-06 PROCEDURE — 84550 ASSAY OF BLOOD/URIC ACID: CPT | Performed by: FAMILY MEDICINE

## 2019-12-06 PROCEDURE — 85027 COMPLETE CBC AUTOMATED: CPT | Performed by: FAMILY MEDICINE

## 2019-12-06 PROCEDURE — 99214 OFFICE O/P EST MOD 30 MIN: CPT | Performed by: FAMILY MEDICINE

## 2019-12-06 PROCEDURE — 36415 COLL VENOUS BLD VENIPUNCTURE: CPT | Performed by: FAMILY MEDICINE

## 2019-12-06 PROCEDURE — 80061 LIPID PANEL: CPT | Performed by: FAMILY MEDICINE

## 2019-12-06 RX ORDER — POTASSIUM CHLORIDE 750 MG/1
10 TABLET, EXTENDED RELEASE ORAL DAILY
Qty: 90 TABLET | Refills: 3 | Status: SHIPPED | OUTPATIENT
Start: 2019-12-06 | End: 2021-01-14

## 2019-12-06 RX ORDER — ALLOPURINOL 100 MG/1
100 TABLET ORAL DAILY
Qty: 90 TABLET | Refills: 3 | Status: SHIPPED | OUTPATIENT
Start: 2019-12-06 | End: 2021-01-14

## 2019-12-06 RX ORDER — TRIAMTERENE/HYDROCHLOROTHIAZID 37.5-25 MG
1 TABLET ORAL DAILY
Qty: 90 TABLET | Refills: 3 | Status: SHIPPED | OUTPATIENT
Start: 2019-12-06 | End: 2021-01-14

## 2019-12-06 RX ORDER — GABAPENTIN 600 MG/1
600 TABLET ORAL 2 TIMES DAILY
Qty: 180 TABLET | Refills: 3 | Status: SHIPPED | OUTPATIENT
Start: 2019-12-06 | End: 2020-12-02

## 2019-12-06 ASSESSMENT — MIFFLIN-ST. JEOR: SCORE: 1355.09

## 2019-12-06 NOTE — LETTER
December 12, 2019      Kyler Whitlock  [address]  [place] [address]        Dear ,    We are writing to inform you of your test results.    Test results indicate you may require additional follow up, see comment below.    Resulted Orders   Uric acid   Result Value Ref Range    Uric Acid 4.7 2.6 - 6.0 mg/dL   Lipid panel reflex to direct LDL Fasting   Result Value Ref Range    Cholesterol 222 (H) <200 mg/dL      Comment:      Desirable:       <200 mg/dl    Triglycerides 89 <150 mg/dL      Comment:      Fasting specimen    HDL Cholesterol 65 >49 mg/dL    LDL Cholesterol Calculated 139 (H) <100 mg/dL      Comment:      Above desirable:  100-129 mg/dl  Borderline High:  130-159 mg/dL  High:             160-189 mg/dL  Very high:       >189 mg/dl      Non HDL Cholesterol 157 (H) <130 mg/dL      Comment:      Above Desirable:  130-159 mg/dl  Borderline high:  160-189 mg/dl  High:             190-219 mg/dl  Very high:       >219 mg/dl     Comprehensive metabolic panel   Result Value Ref Range    Sodium 140 133 - 144 mmol/L    Potassium 4.6 3.4 - 5.3 mmol/L    Chloride 106 94 - 109 mmol/L    Carbon Dioxide 28 20 - 32 mmol/L    Anion Gap 6 3 - 14 mmol/L    Glucose 84 70 - 99 mg/dL      Comment:      Fasting specimen    Urea Nitrogen 32 (H) 7 - 30 mg/dL    Creatinine 0.80 0.52 - 1.04 mg/dL    GFR Estimate 71 >60 mL/min/[1.73_m2]      Comment:      Non  GFR Calc  Starting 12/18/2018, serum creatinine based estimated GFR (eGFR) will be   calculated using the Chronic Kidney Disease Epidemiology Collaboration   (CKD-EPI) equation.      GFR Estimate If Black 83 >60 mL/min/[1.73_m2]      Comment:       GFR Calc  Starting 12/18/2018, serum creatinine based estimated GFR (eGFR) will be   calculated using the Chronic Kidney Disease Epidemiology Collaboration   (CKD-EPI) equation.      Calcium 9.5 8.5 - 10.1 mg/dL    Bilirubin Total 0.3 0.2 - 1.3 mg/dL    Albumin 3.6 3.4 - 5.0  g/dL    Protein Total 7.2 6.8 - 8.8 g/dL    Alkaline Phosphatase 111 40 - 150 U/L    ALT 22 0 - 50 U/L    AST 17 0 - 45 U/L   **CBC with platelets FUTURE anytime   Result Value Ref Range    WBC 7.5 4.0 - 11.0 10e9/L    RBC Count 4.46 3.8 - 5.2 10e12/L    Hemoglobin 12.8 11.7 - 15.7 g/dL    Hematocrit 39.8 35.0 - 47.0 %    MCV 89 78 - 100 fl    MCH 28.7 26.5 - 33.0 pg    MCHC 32.2 31.5 - 36.5 g/dL    RDW 13.9 10.0 - 15.0 %    Platelet Count 277 150 - 450 10e9/L                         Cholesterol is better, uric acid is down (gout), BUN is up- drink more water- but kidney function is otherwise good. Blood counts are normal. Recheck in 1 year. Contact if questions; nice to see you!     If you have any questions or concerns, please call the clinic at the number listed above.       Sincerely,        Pedro Mcclelland MD

## 2019-12-06 NOTE — PROGRESS NOTES
Subjective     Kyler Whitlock is a 74 year old female who presents to clinic today for the following health issues:     [facility]       Hospital Follow-up Visit:    Hospital/Nursing Home/IP Rehab Facility: [facility]  Date of Admission: 08/13/19  Date of Discharge: 08/15/19  Reason(s) for Admission: Right total hip replacement            Problems taking medications regularly:  None       Medication changes since discharge: None       Problems adhering to non-medication therapy:  None       Coding guidelines for this visit:  Type of Medical   Decision Making Face-to-Face Visit       within 7 Days of discharge Face-to-Face Visit        within 14 days of discharge   Moderate Complexity [id number]   High Complexity [id number]          Patient had a right hip arthroplasty on 8/13/19. She has not need to use her cane unless the weather is icy.     Labs  Patient hemoglobin was 8.8 when measured on 8/15, but has improved to 12.8 today. Her potassium improved to 3.5 when drawn on 8/13, she has been taking potassium supplements.    Foot Pain  Patient has some pain on the outside of her feet near her fifth toes. She has a past medical history of gout, takes 100 mcg of allopurinol daily.    Medications  She has been taking 600 mg gabapentin in the morning and at night.    Patient Active Problem List   Diagnosis     Left knee pain     Hyperlipidemia LDL goal <130     Nonspecific abnormal electrocardiogram (ECG) (EKG)     Acute stress reaction     Family history of thyroid disease     Sciatica     HL (hearing loss)     Decreased hearing, bilateral     Gastroesophageal reflux disease, esophagitis presence not specified     Osteoarthritis of multiple joints, unspecified osteoarthritis type     Insomnia, unspecified type     Essential hypertension with goal blood pressure less than 140/90     BMI 40.0-44.9, adult (H)     Peripheral polyneuropathy     Tired     Status post total hip replacement, right     Past  Surgical History:   Procedure Laterality Date     ARTHROPLASTY HIP ANTERIOR Right 8/13/2019    Procedure: RIGHT DIRECT ANTERIOR TOTAL HIP ARTHROPLASTY;  Surgeon: Gt Castillo MD;  Location: [facility] OR     CHOLECYSTECTOMY       HERNIORRHAPHY VENTRAL  4/12/2012    Procedure:HERNIORRHAPHY VENTRAL; ventral hernia repair with mesh; Surgeon:ELOISA INMAN; Location:[facility]     HYSTERECTOMY  5/24/01    uterine prolapse/ant. repair       Social History     Tobacco Use     Smoking status: Never Smoker     Smokeless tobacco: Never Used   Substance Use Topics     Alcohol use: No     Family History   Problem Relation Age of Onset     Blood Disease Mother      Depression Mother      Psychotic Disorder Mother      Thyroid Disease Mother      Heart Disease Father      C.A.D. Brother      Hypertension Brother      Cerebrovascular Disease Sister      Hypertension Sister      Thyroid Disease Sister      Hypertension Son      C.A.D. Brother      Hypertension Brother      Hypertension Son      Hypertension Son      Circulatory Son      Thyroid Disease Son          Current Outpatient Medications   Medication Sig Dispense Refill     allopurinol (ZYLOPRIM) 100 MG tablet Take 1 tablet (100 mg) by mouth daily 90 tablet 3     aspirin (ASA) 325 MG EC tablet Take 325 mg by mouth daily at 2 pm       cyclobenzaprine (FLEXERIL) 10 MG tablet TAKE 1/2 TO 1 TABLET (5-10 MG) BY MOUTH 3 TIMES DAILY AS NEEDED FOR MUSCLE SPASMS 30 tablet 2     gabapentin (NEURONTIN) 600 MG tablet Take 1 tablet (600 mg) by mouth 2 times daily 180 tablet 3     ibuprofen (ADVIL/MOTRIN) 600 MG tablet TAKE 1 TABLET (600 MG) BY MOUTH 2 TIMES DAILY AS NEEDED FOR MODERATE PAIN 60 tablet 0     losartan (COZAAR) 50 MG tablet Take 1 tablet (50 mg) by mouth daily 90 tablet 3     metoprolol succinate ER (TOPROL-XL) 50 MG 24 hr tablet Take 1 tablet (50 mg) by mouth 2 times daily 180 tablet 3     multivitamin (CENTRUM SILVER) tablet Take 1 tablet by mouth daily       order for DME  "Equipment being ordered: Walker Wheels () and Walker ()  Treatment Diagnosis: Impaired gait 1 each 0     potassium chloride ER (K-DUR/KLOR-CON M) 10 MEQ CR tablet Take 1 tablet (10 mEq) by mouth daily 90 tablet 3     senna-docusate (SENOKOT-S/PERICOLACE) 8.6-50 MG tablet Take 1 tablet by mouth 2 times daily 40 tablet 0     triamcinolone (KENALOG) 0.1 % external cream Apply topically daily as needed for irritation 30 g 1     triamterene-HCTZ (MAXZIDE-25) 37.5-25 MG tablet Take 1 tablet by mouth daily 90 tablet 3     zolpidem (AMBIEN) 5 MG tablet TAKE 1 TABLET BY MOUTH EVERY NIGHT AS NEEDED FOR SLEEP 60 tablet 0     oxyCODONE (ROXICODONE) 5 MG tablet Take 1-2 tablets (5-10 mg) by mouth every 4 hours as needed (Patient not taking: Reported on 12/6/2019) 100 tablet 0     No Known Allergies  BP Readings from Last 3 Encounters:   12/06/19 110/80   08/15/19 114/43   08/07/19 136/80    Wt Readings from Last 3 Encounters:   12/06/19 94.5 kg (208 lb 4.8 oz)   08/13/19 91.6 kg (202 lb)   08/07/19 88.9 kg (196 lb)            Reviewed and updated as needed this visit by Provider         Review of Systems   POSITIVE foot pain    Denies headache, insomnia, chest pain, shortness of breath, cough, heartburn, bowel issues, bladder issues, neck pain, back pain, hip pain, knee pain, ankle pain. Remainder of ROS is negative unless otherwise noted above or in HPI.    This document serves as a record of the services and decisions personally performed and made by Pedro Mcclelland MD. It was created on his behalf by Rm Gillis, trained medical scribe. The creation of this document is based on the provider's statements to the medical scribe.  Rm Gillis 10:47 AM December 6, 2019        Objective    /80   Pulse 60   Temp 97.6  F (36.4  C) (Oral)   Resp 14   Ht 1.522 m (4' 11.92\")   Wt 94.5 kg (208 lb 4.8 oz)   SpO2 98%   BMI 40.79 kg/m    Body mass index is 40.79 kg/m .  Physical Exam   GENERAL: healthy, alert and " no distress  RESP: lungs clear to auscultation - no rales, rhonchi or wheezes  CV: regular rate and rhythm, normal S1 S2, no S3 or S4, no murmur, click or rub, no peripheral edema and peripheral pulses strong  MS: no gross musculoskeletal defects noted, 3-4+ pitting and non pitting edema in the lower legs  SKIN: no suspicious lesions or rashes  NEURO: Normal strength and tone, mentation intact and speech normal  PSYCH: mentation appears normal, affect normal/bright    Diagnostic Test Results:  Labs reviewed in Epic  Results for orders placed or performed in visit on 12/06/19 (from the past 24 hour(s))   Uric acid   Result Value Ref Range    Uric Acid 4.7 2.6 - 6.0 mg/dL   Lipid panel reflex to direct LDL Fasting   Result Value Ref Range    Cholesterol 222 (H) <200 mg/dL    Triglycerides 89 <150 mg/dL    HDL Cholesterol 65 >49 mg/dL    LDL Cholesterol Calculated 139 (H) <100 mg/dL    Non HDL Cholesterol 157 (H) <130 mg/dL   Comprehensive metabolic panel   Result Value Ref Range    Sodium 140 133 - 144 mmol/L    Potassium 4.6 3.4 - 5.3 mmol/L    Chloride 106 94 - 109 mmol/L    Carbon Dioxide 28 20 - 32 mmol/L    Anion Gap 6 3 - 14 mmol/L    Glucose 84 70 - 99 mg/dL    Urea Nitrogen 32 (H) 7 - 30 mg/dL    Creatinine 0.80 0.52 - 1.04 mg/dL    GFR Estimate 71 >60 mL/min/[1.73_m2]    GFR Estimate If Black 83 >60 mL/min/[1.73_m2]    Calcium 9.5 8.5 - 10.1 mg/dL    Bilirubin Total 0.3 0.2 - 1.3 mg/dL    Albumin 3.6 3.4 - 5.0 g/dL    Protein Total 7.2 6.8 - 8.8 g/dL    Alkaline Phosphatase 111 40 - 150 U/L    ALT 22 0 - 50 U/L    AST 17 0 - 45 U/L   **CBC with platelets FUTURE anytime   Result Value Ref Range    WBC 7.5 4.0 - 11.0 10e9/L    RBC Count 4.46 3.8 - 5.2 10e12/L    Hemoglobin 12.8 11.7 - 15.7 g/dL    Hematocrit 39.8 35.0 - 47.0 %    MCV 89 78 - 100 fl    MCH 28.7 26.5 - 33.0 pg    MCHC 32.2 31.5 - 36.5 g/dL    RDW 13.9 10.0 - 15.0 %    Platelet Count 277 150 - 450 10e9/L           Assessment & Plan   (M17.0)  Osteoarthritis of both knees, unspecified osteoarthritis type  Comment: Stable. Controlled by current medication.  Plan: gabapentin (NEURONTIN) 600 MG tablet        Continue taking medication. Follow up as needed.    (I10) Essential hypertension with goal blood pressure less than 140/90  Comment: <140/90 at goal.  Plan: triamterene-HCTZ (MAXZIDE-25) 37.5-25 MG tablet        Continue taking medication. Follow up as needed.    (E87.6) Hypokalemia  Comment: Pending lab work.  Plan: potassium chloride ER (K-DUR/KLOR-CON M) 10 MEQ        CR tablet        Will notify with results. Follow up as needed.    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 Controlled by current medication.  Plan: allopurinol (ZYLOPRIM) 100 MG tablet        Continue taking medication. Follow up as needed.      Patient Instructions   Will notify with lab results.      The information in this document, created by the medical scribe for me, accurately reflects the services I personally performed and the decisions made by me. I have reviewed and approved this document for accuracy prior to leaving the patient care area.  December 6, 2019 10:48 AM    Pedro Mcclelland MD  [facility] [place]

## 2019-12-12 NOTE — RESULT ENCOUNTER NOTE
Please notify patient- send letter with copy of labs: cholesterol is better, uric acid is down (gout), BUN is up- drink more water- but kidney function is otherwise good. Blood counts are normal. Recheck in 1 year. Contact if questions; nice to see you!     Thanks Pedro Mcclelland MD

## 2019-12-19 DIAGNOSIS — M17.0 OSTEOARTHRITIS OF BOTH KNEES, UNSPECIFIED OSTEOARTHRITIS TYPE: ICD-10-CM

## 2019-12-19 RX ORDER — IBUPROFEN 600 MG/1
600 TABLET, FILM COATED ORAL 2 TIMES DAILY PRN
Qty: 60 TABLET | Refills: 0 | Status: SHIPPED | OUTPATIENT
Start: 2019-12-19 | End: 2020-07-07

## 2019-12-19 NOTE — TELEPHONE ENCOUNTER
"Requested Prescriptions   Pending Prescriptions Disp Refills     ibuprofen (ADVIL/MOTRIN) 600 MG tablet [Pharmacy Med Name: IBUPROFEN 600 MG TABLET] 60 tablet 0     Sig: TAKE 1 TABLET (600 MG) BY MOUTH 2 TIMES DAILY AS NEEDED FOR MODERATE PAIN       NSAID Medications Failed - 12/19/2019  1:54 AM        Failed - Patient is age 6-64 years        Passed - Blood pressure under 140/90 in past 12 months     BP Readings from Last 3 Encounters:   12/06/19 110/80   08/15/19 114/43   08/07/19 136/80                 Passed - Normal ALT on file in past 12 months     Recent Labs   Lab Test 12/06/19  1007   ALT 22             Passed - Normal AST on file in past 12 months     Recent Labs   Lab Test 12/06/19  1007   AST 17             Passed - Recent (12 mo) or future (30 days) visit within the authorizing provider's specialty     Patient has had an office visit with the authorizing provider or a provider within the authorizing providers department within the previous 12 mos or has a future within next 30 days. See \"Patient Info\" tab in inbasket, or \"Choose Columns\" in Meds & Orders section of the refill encounter.              Passed - Normal CBC on file in past 12 months     Recent Labs   Lab Test 12/06/19  1007   WBC 7.5   RBC 4.46   HGB 12.8   HCT 39.8                    Passed - Medication is active on med list        Passed - No active pregnancy on record        Passed - Normal serum creatinine on file in past 12 months     Recent Labs   Lab Test 12/06/19  1007   CR 0.80             Passed - No positive pregnancy test in past 12 months        Medication refills given.  Grecia Bellamy RN on 12/19/2019 at 8:34 AM    "

## 2020-01-09 DIAGNOSIS — M15.9 OSTEOARTHRITIS OF MULTIPLE JOINTS, UNSPECIFIED OSTEOARTHRITIS TYPE: Primary | ICD-10-CM

## 2020-01-10 RX ORDER — CYCLOBENZAPRINE HCL 10 MG
TABLET ORAL
Qty: 60 TABLET | Refills: 1 | Status: SHIPPED | OUTPATIENT
Start: 2020-01-10 | End: 2020-04-15

## 2020-01-10 NOTE — TELEPHONE ENCOUNTER
Requested Prescriptions   Pending Prescriptions Disp Refills     cyclobenzaprine (FLEXERIL) 10 MG tablet [Pharmacy Med Name: CYCLOBENZAPRINE 10 MG TABLET] 60 tablet 1     Sig: TAKE 1/2 TO 1 TABLET (5-10 MG) BY MOUTH 3 TIMES DAILY AS NEEDED FOR MUSCLE SPASMS       There is no refill protocol information for this order        Last Written Prescription Date:  9/28/18  Last Fill Quantity: 30,   # refills: 2    Routing refill request to provider for review/approval because:  Drug not on the G, P or [facility] refill protocol or controlled substance

## 2020-02-19 DIAGNOSIS — B37.31 CANDIDAL VULVOVAGINITIS: ICD-10-CM

## 2020-02-20 RX ORDER — TRIAMCINOLONE ACETONIDE 1 MG/G
CREAM TOPICAL DAILY PRN
Qty: 30 G | Refills: 0 | Status: SHIPPED | OUTPATIENT
Start: 2020-02-20 | End: 2020-10-15

## 2020-02-20 NOTE — TELEPHONE ENCOUNTER
"Requested Prescriptions   Pending Prescriptions Disp Refills     TRIAMCINOLONE 0.1 % EX external cream [Pharmacy Med Name: TRIAMCINOLONE 0.1% CREAM] 30 g 1     Sig: APPLY TOPICALLY DAILY AS NEEDED FOR IRRITATION  Last Written Prescription Date:  02/05/2019  Last Fill Quantity: 30,  # refills: 1   Last office visit: 12/6/2019 with prescribing provider:  12/06/2019   Future Office Visit:         Topical Steroids and Nonsteroidals Protocol Passed - 2/19/2020  8:30 PM        Passed - Patient is age 6 or older        Passed - Authorizing prescriber's most recent note related to this medication read.     If refill request is for ophthalmic use, please forward request to provider for approval.          Passed - High potency steroid not ordered        Passed - Recent (12 mo) or future (30 days) visit within the authorizing provider's specialty     Patient has had an office visit with the authorizing provider or a provider within the authorizing providers department within the previous 12 mos or has a future within next 30 days. See \"Patient Info\" tab in inbasket, or \"Choose Columns\" in Meds & Orders section of the refill encounter.              Passed - Medication is active on med list        "

## 2020-02-20 NOTE — TELEPHONE ENCOUNTER
Prescription approved per [facility] [place].  Grecia Bellamy RN on 2/20/2020 at 10:21 AM

## 2020-02-24 ENCOUNTER — NURSE TRIAGE (OUTPATIENT)
Dept: NURSING | Facility: CLINIC | Age: 75
End: 2020-02-24

## 2020-02-25 NOTE — TELEPHONE ENCOUNTER
Caller has had a cough for 8 days and fears she has pneumonia; no fever and cough is dry and hacking; has nasal discharge    Triage protocol reviewed   Advised to be seen in clinic this  week for assessment   Advised to  call for fever or chest pain or any new or worsening symptoms   Caller understands and will comply   Call transferred to  but patient  did not wait   Chana Meeks RN  [facility]      Reason for Disposition    [1] Nasal discharge AND [2] present > 10 days    Additional Information    Negative: Severe difficulty breathing (e.g., struggling for each breath, speaks in single words)    Negative: Bluish (or gray) lips or face now    Negative: [1] Difficulty breathing AND [2] exposure to flames, smoke, or fumes    Negative: [1] Stridor AND [2] difficulty breathing    Negative: Sounds like a life-threatening emergency to the triager    Dry (non-productive) cough (i.e., no sputum or minimal clear sputum)    Negative: Severe difficulty breathing (e.g., struggling for each breath, speaks in single words)    Negative: Bluish (or gray) lips or face now    Negative: [1] Rapid onset of cough AND [2] has hives    Negative: Coughing started suddenly after medicine, an allergic food or bee sting    Negative: [1] Difficulty breathing AND [2] exposure to flames, smoke, or fumes    Negative: [1] Stridor AND [2] difficulty breathing    Negative: Sounds like a life-threatening emergency to the triager    Negative: Choked on object of food that could be caught in the throat    Negative: Chest pain is main symptom    Negative: [1] Previous asthma attacks AND [2] this feels like asthma attack    Negative: Cough lasts > 3 weeks    Negative: Wet (productive) cough (i.e., white-yellow, yellow, green, or tammy colored sputum)    Negative: Chest pain  (Exception: MILD central chest pain, present only when coughing)    Negative: Difficulty breathing    Negative: Patient sounds very sick or weak to the triager    Negative: [1]  Coughed up blood AND [2] > 1 tablespoon (15 ml) (Exception: blood-tinged sputum)    Negative: Fever > 103 F (39.4 C)    Negative: [1] Fever > 101 F (38.3 C) AND [2] age > 60    Negative: [1] Fever > 100.0 F (37.8 C) AND [2] bedridden (e.g., nursing home patient, CVA, chronic illness, recovering from surgery)    Negative: [1] Fever > 100.0 F (37.8 C) AND [2] diabetes mellitus or weak immune system (e.g., HIV positive, cancer chemo, splenectomy, chronic steroids)    Negative: Wheezing is present    Negative: [1] Ankle swelling AND [2] swelling is increasing    Negative: SEVERE coughing spells (e.g., whooping sound after coughing, vomiting after coughing)    Negative: [1] Continuous (nonstop) coughing interferes with work or school AND [2] no improvement using cough treatment per protocol    Negative: Fever present > 3 days (72 hours)    Negative: [1] Fever returns after gone for over 24 hours AND [2] symptoms worse or not improved    Negative: [1] Using nasal washes and pain medicine > 24 hours AND [2] sinus pain (around cheekbone or eye) persists    Negative: Earache is present    Protocols used: COUGH - ACUTE NON-PRODUCTIVE-A-AH, COUGH - ACUTE [id number]

## 2020-03-01 ENCOUNTER — NURSE TRIAGE (OUTPATIENT)
Dept: NURSING | Facility: CLINIC | Age: 75
End: 2020-03-01

## 2020-03-01 NOTE — TELEPHONE ENCOUNTER
"Calling about cold symptoms of runny nose and cough. These symptoms have improved from her last call to Triage on 2/24.  No fever, HA or wheezing. Has a cough which sometimes brings up some \"mucous\".   Is feeling better than she was last week.  Able to care for self at home.    Mary Ruiz RN, [facility]    Additional Information    Negative: Severe difficulty breathing (e.g., struggling for each breath, speaks in single words)    Negative: Sounds like a life-threatening emergency to the triager    Negative: Fever > 104 F (40 C)    Negative: [1] Difficulty breathing AND [2] not severe AND [3] not from stuffy nose (e.g., not relieved by cleaning out the nose)    Negative: Patient sounds very sick or weak to the triager    Negative: [1] Fever > 101 F (38.3 C) AND [2] age > 60    Negative: [1] Fever > 100.0 F (37.8 C) AND [2] bedridden (e.g., nursing home patient, CVA, chronic illness, recovering from surgery)    Negative: [1] Fever > 100.0 F (37.8 C) AND [2] diabetes mellitus or weak immune system (e.g., HIV positive, cancer chemo, splenectomy, chronic steroids)    Negative: Fever present > 3 days (72 hours)    Negative: [1] Fever returns after gone for over 24 hours AND [2] symptoms worse or not improved    Negative: [1] Sinus pain (not just congestion) AND [2] fever    Negative: Earache    Negative: [1] SEVERE sore throat AND [2] present > 24 hours    Negative: [1] Sinus congestion (pressure, fullness) AND [2] present > 10 days    Negative: [1] Nasal discharge AND [2] present > 10 days    Negative: [1] Using nasal washes and pain medicine > 24 hours AND [2] sinus pain (lower forehead, cheekbone, or eye) persists    Negative: Sores with yellow scabs around the nasal opening    Cold with no complications    Protocols used: COMMON COLD-A-AH    "

## 2020-03-03 NOTE — PROGRESS NOTES
Subjective     Kyler Whitlock is a 74 year old female who presents to clinic today for the following health issues:    HPI   Acute Illness   Acute illness concerns: cold   Onset: 2 weeks     Fever: no     Chills/Sweats: no     Headache (location?): no     Sinus Pressure:no    Conjunctivitis:  no    Ear Pain: no    Rhinorrhea: no     Congestion: YES    Sore Throat: no      Cough: YES    Wheeze: no     Decreased Appetite: no     Nausea: no     Vomiting: no     Diarrhea:  no     Dysuria/Freq.: no     Fatigue/Achiness: YES-fatigue    Sick/Strep Exposure: no      Therapies Tried and outcome: dayquil and cough drops    Patient's symptoms began approximately two weeks. Symptoms include congestion, cough and fatigue/achiness. All symptoms have improved since onset. She has used Dayquil and cough drops.     Patient Active Problem List   Diagnosis     Left knee pain     Hyperlipidemia LDL goal <130     Nonspecific abnormal electrocardiogram (ECG) (EKG)     Acute stress reaction     Family history of thyroid disease     Sciatica     HL (hearing loss)     Decreased hearing, bilateral     Gastroesophageal reflux disease, esophagitis presence not specified     Osteoarthritis of multiple joints, unspecified osteoarthritis type     Insomnia, unspecified type     Essential hypertension with goal blood pressure less than 140/90     BMI 40.0-44.9, adult (H)     Peripheral polyneuropathy     Tired     Status post total hip replacement, right     Past Surgical History:   Procedure Laterality Date     ARTHROPLASTY HIP ANTERIOR Right 8/13/2019    Procedure: RIGHT DIRECT ANTERIOR TOTAL HIP ARTHROPLASTY;  Surgeon: Gt Castillo MD;  Location:  OR     CHOLECYSTECTOMY       HERNIORRHAPHY VENTRAL  4/12/2012    Procedure:HERNIORRHAPHY VENTRAL; ventral hernia repair with mesh; Surgeon:ELOISA INMAN; Location: [facility]     HYSTERECTOMY  5/24/01    uterine prolapse/ant. repair       Social History     Tobacco Use     Smoking status:  Never Smoker     Smokeless tobacco: Never Used   Substance Use Topics     Alcohol use: No     Family History   Problem Relation Age of Onset     Blood Disease Mother      Depression Mother      Psychotic Disorder Mother      Thyroid Disease Mother      Heart Disease Father      [place] Brother      Hypertension Brother      Cerebrovascular Disease Sister      Hypertension Sister      Thyroid Disease Sister      Hypertension Son      CCHRISTOPHER Brother      Hypertension Brother      Hypertension Son      Hypertension Son      Circulatory Son      Thyroid Disease Son          Current Outpatient Medications   Medication Sig Dispense Refill     allopurinol (ZYLOPRIM) 100 MG tablet Take 1 tablet (100 mg) by mouth daily 90 tablet 3     aspirin (ASA) 325 MG EC tablet Take 325 mg by mouth daily at 2 pm       cyclobenzaprine (FLEXERIL) 10 MG tablet TAKE 1/2 TO 1 TABLET (5-10 MG) BY MOUTH 3 TIMES DAILY AS NEEDED FOR MUSCLE SPASMS 60 tablet 1     gabapentin (NEURONTIN) 600 MG tablet Take 1 tablet (600 mg) by mouth 2 times daily 180 tablet 3     ibuprofen (ADVIL/MOTRIN) 600 MG tablet TAKE 1 TABLET (600 MG) BY MOUTH 2 TIMES DAILY AS NEEDED FOR MODERATE PAIN 60 tablet 0     losartan (COZAAR) 50 MG tablet Take 1 tablet (50 mg) by mouth daily 90 tablet 3     metoprolol succinate ER (TOPROL-XL) 50 MG 24 hr tablet Take 1 tablet (50 mg) by mouth 2 times daily 180 tablet 3     multivitamin (CENTRUM SILVER) tablet Take 1 tablet by mouth daily       order for DME Equipment being ordered: Walker Wheels () and Walker ()  Treatment Diagnosis: Impaired gait 1 each 0     oxyCODONE (ROXICODONE) 5 MG tablet Take 1-2 tablets (5-10 mg) by mouth every 4 hours as needed 100 tablet 0     potassium chloride ER (K-DUR/KLOR-CON M) 10 MEQ CR tablet Take 1 tablet (10 mEq) by mouth daily 90 tablet 3     senna-docusate (SENOKOT-S/PERICOLACE) 8.6-50 MG tablet Take 1 tablet by mouth 2 times daily 40 tablet 0     TRIAMCINOLONE 0.1 % EX external cream  "APPLY TOPICALLY DAILY AS NEEDED FOR IRRITATION 30 g 0     triamterene-HCTZ (MAXZIDE-25) 37.5-25 MG tablet Take 1 tablet by mouth daily 90 tablet 3     zolpidem (AMBIEN) 5 MG tablet TAKE 1 TABLET BY MOUTH EVERY NIGHT AS NEEDED FOR SLEEP 90 tablet 1     No Known Allergies  BP Readings from Last 3 Encounters:   03/04/20 114/76   12/06/19 110/80   08/15/19 114/43    Wt Readings from Last 3 Encounters:   03/04/20 96.3 kg (212 lb 4.8 oz)   12/06/19 94.5 kg (208 lb 4.8 oz)   08/13/19 91.6 kg (202 lb)         Reviewed and updated as needed this visit by Provider       Review of Systems     Positive: cough, congestion, fatigue/achiness    Denies headache, insomnia, chest pain, shortness of breath, heartburn, bowel issues, bladder issues, neck pain, back pain, hip pain, knee pain, ankle pain, or foot pain. Remainder of ROS is negative unless otherwise noted above or in HPI.    This document serves as a record of the services and decisions personally performed and made by Pedro Mcclelland MD. It was created on his behalf by Franky Nicholas, trained medical scribe. The creation of this document is based on the provider's statements to the medical scribe.  Franky Nicholas 8:10 AM March 4, 2020      Objective    /76   Pulse 64   Temp 97.6  F (36.4  C) (Oral)   Resp 18   Ht 1.515 m (4' 11.65\")   Wt 96.3 kg (212 lb 4.8 oz)   SpO2 99%   BMI 41.96 kg/m    Body mass index is 41.96 kg/m .  Physical Exam   GENERAL: healthy, alert and no distress  HENT: ear canals and TM's normal, nose and mouth without ulcers or lesions,slight redness and some mucous present in back of throat   RESP: lungs clear to auscultation - no rales, rhonchi or wheezes  CV: regular rate and rhythm, normal S1 S2, no S3 or S4, no murmur, click or rub, no peripheral edema and peripheral pulses strong  MS: no gross musculoskeletal defects noted, no edema  SKIN: no suspicious lesions or rashes  NEURO: Normal strength and tone, mentation intact and speech normal  PSYCH: " mentation appears normal, affect normal/bright    Diagnostic Test Results:  Labs reviewed in Epic  none       Assessment & Plan    (J06.9) Upper respiratory infection, viral  (primary encounter diagnosis)  Comment: Onset two weeks ago.   Plan: Symptomatic treatment. Follow up as needed    Patient Instructions   Symptomatic treatment. Follow up as needed.    The information in this document, created by the medical scribe for me, accurately reflects the services I personally performed and the decisions made by me. I have reviewed and approved this document for accuracy prior to leaving the patient care area.  March 4, 2020 8:10 AM    Pedro Mcclelland MD  [facility]

## 2020-03-04 ENCOUNTER — OFFICE VISIT (OUTPATIENT)
Dept: FAMILY MEDICINE | Facility: CLINIC | Age: 75
End: 2020-03-04
Payer: MEDICARE

## 2020-03-04 VITALS
WEIGHT: 212.3 LBS | SYSTOLIC BLOOD PRESSURE: 114 MMHG | RESPIRATION RATE: 18 BRPM | TEMPERATURE: 97.6 F | DIASTOLIC BLOOD PRESSURE: 76 MMHG | OXYGEN SATURATION: 99 % | HEIGHT: 60 IN | BODY MASS INDEX: 41.68 KG/M2 | HEART RATE: 64 BPM

## 2020-03-04 DIAGNOSIS — J06.9 UPPER RESPIRATORY INFECTION, VIRAL: Primary | ICD-10-CM

## 2020-03-04 PROCEDURE — 99213 OFFICE O/P EST LOW 20 MIN: CPT | Performed by: FAMILY MEDICINE

## 2020-03-04 ASSESSMENT — MIFFLIN-ST. JEOR: SCORE: 1368.87

## 2020-04-14 DIAGNOSIS — M15.9 OSTEOARTHRITIS OF MULTIPLE JOINTS, UNSPECIFIED OSTEOARTHRITIS TYPE: ICD-10-CM

## 2020-04-15 RX ORDER — CYCLOBENZAPRINE HCL 10 MG
TABLET ORAL
Qty: 60 TABLET | Refills: 1 | Status: SHIPPED | OUTPATIENT
Start: 2020-04-15 | End: 2021-07-21

## 2020-04-15 NOTE — TELEPHONE ENCOUNTER
Requested Prescriptions   Pending Prescriptions Disp Refills     cyclobenzaprine (FLEXERIL) 10 MG tablet [Pharmacy Med Name: CYCLOBENZAPRINE 10 MG TABLET] 60 tablet 1     Sig: TAKE 1/2 TO 1 TABLET (5-10 MG) BY MOUTH 3 TIMES DAILY AS NEEDED FOR MUSCLE SPASMS       There is no refill protocol information for this order              Last Written Prescription Date:  1/10/20  Last Fill Quantity: 60,   # refills: 1  Last Office Visit: 3/4/20  Future Office visit:       Routing refill request to provider for review/approval because:  Drug not on the [facility], [facility] or [facility] refill protocol or controlled substance

## 2020-04-30 ENCOUNTER — TELEPHONE (OUTPATIENT)
Dept: FAMILY MEDICINE | Facility: CLINIC | Age: 75
End: 2020-04-30

## 2020-04-30 DIAGNOSIS — Z20.828 EXPOSURE TO SARS-ASSOCIATED CORONAVIRUS: Primary | ICD-10-CM

## 2020-04-30 NOTE — TELEPHONE ENCOUNTER
"Was sick in February.    No symptoms in the last 2 weeks.    Patient is calling requesting COVID serologic antibody testing.  NOTE: Serologic testing is a blood test for 'antibodies' which are made at 10-14 days after you have had symptoms of COVID or were exposed and had an asymptomatic infection.  This does NOT test you for 'active' infection or tell you if you are contagious.    Are you a healthcare worker?  No  Do you have cough, fever, myalgias, or shortness of breath?  No  Were you exposed to a lab confirmed positive or possible case of COVID-19?  Possible exposure 30+ days ago.  Possible exposure > 14 days ago.      The patient was informed: \"Testing is limited each day and it may take time for testing to be available to everyone who has called.  We will be calling you to schedule testing- please confirm the best number to reach you is [phone].\"    Lab order placed per COVID Serologic Testing standing orders.    {Keiry Miramontes RN  [facility] Nurse Advisor        "

## 2020-04-30 NOTE — TELEPHONE ENCOUNTER
Reason for call:  Other   Patient called regarding (reason for call): call back  Additional comments: Patient called and stated that she would to have the Serology test done. Patient would like a call back.    Phone number to reach patient:  Home number on file [phone] (home)    Best Time:  na    Can we leave a detailed message on this number?  YES    Travel screening: Not Applicable

## 2020-05-05 ENCOUNTER — NURSE TRIAGE (OUTPATIENT)
Dept: NURSING | Facility: CLINIC | Age: 75
End: 2020-05-05

## 2020-05-05 DIAGNOSIS — Z20.828 EXPOSURE TO SARS-ASSOCIATED CORONAVIRUS: ICD-10-CM

## 2020-05-05 PROCEDURE — 36415 COLL VENOUS BLD VENIPUNCTURE: CPT | Performed by: EMERGENCY MEDICINE

## 2020-05-05 PROCEDURE — 99000 SPECIMEN HANDLING OFFICE-LAB: CPT | Performed by: EMERGENCY MEDICINE

## 2020-05-05 PROCEDURE — 86769 SARS-COV-2 COVID-19 ANTIBODY: CPT | Mod: 90 | Performed by: EMERGENCY MEDICINE

## 2020-05-05 NOTE — TELEPHONE ENCOUNTER
"Pt already has order entered for COVID serology antibody testing.  States \"No one has called me yet to schedule ..\"  Agreed to transfer her now to the (confidential) scheduling line.  Done.    Betzaida HERNANDEZ [facility] Nurse Advisor     Reason for Disposition    [1] Caller requesting NON-URGENT health information AND [2] PCP's office is the best resource    Protocols used: INFORMATION ONLY CALL-A-AH      "

## 2020-05-07 LAB
COVID-19 SPIKE RBD ABY TITER: NORMAL
COVID-19 SPIKE RBD ABY: NEGATIVE

## 2020-05-08 ENCOUNTER — TELEPHONE (OUTPATIENT)
Dept: FAMILY MEDICINE | Facility: CLINIC | Age: 75
End: 2020-05-08

## 2020-05-08 NOTE — TELEPHONE ENCOUNTER
Reason for call:  Results     Name of test or procedure: serology test     Date of test or procedure: 5/5/20  r  Location of test or procedure: [facility]    Additional comments: Pt want to know her test result    Phone number to reach patient:  Home number on file [phone] (home)    Best Time:  anytime    Can we leave a detailed message on this number?  YES    Travel screening: Not Applicable

## 2020-05-08 NOTE — TELEPHONE ENCOUNTER
Pt notified  Test done on 5/5/2020  Notes recorded by Kera Landeros RN on 5/8/2020 at 1:43 PM CDT   Serology (COVID-19) Notification     You have tested NEGATIVE for COVID-19 antibodies   Letter sent to Patient     Kalpana Neal RN   [facility]

## 2020-07-06 DIAGNOSIS — I10 ESSENTIAL HYPERTENSION WITH GOAL BLOOD PRESSURE LESS THAN 140/90: ICD-10-CM

## 2020-07-06 DIAGNOSIS — M17.0 OSTEOARTHRITIS OF BOTH KNEES, UNSPECIFIED OSTEOARTHRITIS TYPE: ICD-10-CM

## 2020-07-06 RX ORDER — LOSARTAN POTASSIUM 50 MG/1
TABLET ORAL
Qty: 90 TABLET | Refills: 1 | Status: SHIPPED | OUTPATIENT
Start: 2020-07-06 | End: 2021-01-12

## 2020-07-06 NOTE — TELEPHONE ENCOUNTER
Routing refill request to provider for review/approval because:  Pt is over 62 years of age.    Johanny MORLEY RN  EP Triage

## 2020-07-06 NOTE — TELEPHONE ENCOUNTER
Prescription approved per [facility] Refill Protocol.    Johanny MORLEY RN  EP Triage

## 2020-07-07 RX ORDER — IBUPROFEN 600 MG/1
600 TABLET, FILM COATED ORAL 2 TIMES DAILY PRN
Qty: 60 TABLET | Refills: 0 | Status: SHIPPED | OUTPATIENT
Start: 2020-07-07 | End: 2020-08-05

## 2020-07-09 DIAGNOSIS — I10 ESSENTIAL HYPERTENSION WITH GOAL BLOOD PRESSURE LESS THAN 140/90: ICD-10-CM

## 2020-07-10 RX ORDER — METOPROLOL SUCCINATE 50 MG/1
TABLET, EXTENDED RELEASE ORAL
Qty: 180 TABLET | Refills: 3 | Status: SHIPPED | OUTPATIENT
Start: 2020-07-10 | End: 2021-06-20

## 2020-07-10 NOTE — TELEPHONE ENCOUNTER
"Requested Prescriptions   Pending Prescriptions Disp Refills     metoprolol succinate ER (TOPROL-XL) 50 MG 24 hr tablet [Pharmacy Med Name: METOPROLOL SUCC ER 50 MG TAB] 180 tablet 3     Sig: TAKE 1 TABLET BY MOUTH TWICE A DAY       Beta-Blockers Protocol Passed - 7/9/2020 12:15 AM        Passed - Blood pressure under 140/90 in past 12 months     BP Readings from Last 3 Encounters:   03/04/20 114/76   12/06/19 110/80   08/15/19 114/43                 Passed - Patient is age 6 or older        Passed - Recent (12 mo) or future (30 days) visit within the authorizing provider's specialty     Patient has had an office visit with the authorizing provider or a provider within the authorizing providers department within the previous 12 mos or has a future within next 30 days. See \"Patient Info\" tab in inbasket, or \"Choose Columns\" in Meds & Orders section of the refill encounter.              Passed - Medication is active on med list         Prescription approved per [facility] Refill Protocol.  Ketty Staton RN   [facility]   "

## 2020-07-21 DIAGNOSIS — G47.00 INSOMNIA, UNSPECIFIED TYPE: ICD-10-CM

## 2020-07-23 RX ORDER — ZOLPIDEM TARTRATE 5 MG/1
5 TABLET ORAL
Qty: 90 TABLET | Refills: 1 | Status: SHIPPED | OUTPATIENT
Start: 2020-07-23 | End: 2020-12-02

## 2020-07-23 NOTE — TELEPHONE ENCOUNTER
Routing refill request to provider for review/approval because:  Drug not on the [facility] refill protocol    checked, last fill: 4/26/20    Ketty Staton RN   [facility]

## 2020-08-02 DIAGNOSIS — M17.0 OSTEOARTHRITIS OF BOTH KNEES, UNSPECIFIED OSTEOARTHRITIS TYPE: ICD-10-CM

## 2020-08-05 RX ORDER — IBUPROFEN 600 MG/1
600 TABLET, FILM COATED ORAL 2 TIMES DAILY PRN
Qty: 60 TABLET | Refills: 3 | Status: SHIPPED | OUTPATIENT
Start: 2020-08-05 | End: 2023-09-29

## 2020-08-05 NOTE — TELEPHONE ENCOUNTER
Routing refill request to provider for review/approval because:  Failed protocol      NSAID Medications [id number]/02/2020 12:29 AM   Patient is age 6-64 years

## 2020-10-15 DIAGNOSIS — B37.31 CANDIDAL VULVOVAGINITIS: ICD-10-CM

## 2020-10-15 RX ORDER — TRIAMCINOLONE ACETONIDE 1 MG/G
CREAM TOPICAL DAILY PRN
Qty: 30 G | Refills: 0 | Status: SHIPPED | OUTPATIENT
Start: 2020-10-15 | End: 2021-02-25

## 2020-10-15 NOTE — TELEPHONE ENCOUNTER
Prescription approved per [facility] Refill Protocol.    Kalpana Neal RN   [facility]

## 2020-11-30 DIAGNOSIS — G47.00 INSOMNIA, UNSPECIFIED TYPE: ICD-10-CM

## 2020-11-30 DIAGNOSIS — G62.9 PERIPHERAL POLYNEUROPATHY: ICD-10-CM

## 2020-12-01 NOTE — TELEPHONE ENCOUNTER
Patient have a week left but she plan to go out of town on Sunday 12/6/20 and will be gone for a months.  She needs her medications before she go.  If she don't hear anything from clinic, she will call back tomorrow to f/u on refill

## 2020-12-02 RX ORDER — ZOLPIDEM TARTRATE 5 MG/1
5 TABLET ORAL
Qty: 90 TABLET | Refills: 0 | Status: SHIPPED | OUTPATIENT
Start: 2020-12-02 | End: 2021-04-15

## 2020-12-02 RX ORDER — GABAPENTIN 300 MG/1
CAPSULE ORAL
Qty: 360 CAPSULE | Refills: 1 | Status: SHIPPED | OUTPATIENT
Start: 2020-12-02 | End: 2021-06-02

## 2020-12-02 NOTE — TELEPHONE ENCOUNTER
Dr Mcclelland    Gabapentin 300mg 2x day xday is what she takes  I took the 600mg off her medication profileas she stated it was too expensive  She was also wondering if you could send in a refill for her ambien as she is going to be gone until 1/6/2021 and would need the refill when she gets back home. She is going to be in [place] over the holidays    meds [facility]    Kalpana Neal RN   [facility]

## 2020-12-02 NOTE — TELEPHONE ENCOUNTER
Left vm for patient to return call to clinic. Need to verify dose of gabapentin, as 300 mg dose that is cued up was discontinued, and 600 mg is on med list. If patient calls back please verify what dose she is on and cue up.    Thanks  Ketty Staton RN   [facility]

## 2020-12-03 ENCOUNTER — TRANSFERRED RECORDS (OUTPATIENT)
Dept: HEALTH INFORMATION MANAGEMENT | Facility: CLINIC | Age: 75
End: 2020-12-03

## 2021-01-13 DIAGNOSIS — M1A.2790 DRUG-INDUCED CHRONIC GOUT OF FOOT WITHOUT TOPHUS, UNSPECIFIED LATERALITY: ICD-10-CM

## 2021-01-13 DIAGNOSIS — E87.6 HYPOKALEMIA: ICD-10-CM

## 2021-01-13 DIAGNOSIS — I10 ESSENTIAL HYPERTENSION WITH GOAL BLOOD PRESSURE LESS THAN 140/90: ICD-10-CM

## 2021-01-13 DIAGNOSIS — R53.83 TIRED: Primary | ICD-10-CM

## 2021-01-14 RX ORDER — ALLOPURINOL 100 MG/1
TABLET ORAL
Qty: 90 TABLET | Refills: 3 | Status: SHIPPED | OUTPATIENT
Start: 2021-01-14 | End: 2022-01-07

## 2021-01-14 RX ORDER — TRIAMTERENE/HYDROCHLOROTHIAZID 37.5-25 MG
TABLET ORAL
Qty: 90 TABLET | Refills: 3 | Status: SHIPPED | OUTPATIENT
Start: 2021-01-14 | End: 2022-01-07

## 2021-01-14 RX ORDER — POTASSIUM CHLORIDE 750 MG/1
TABLET, EXTENDED RELEASE ORAL
Qty: 90 TABLET | Refills: 3 | Status: SHIPPED | OUTPATIENT
Start: 2021-01-14 | End: 2022-01-07

## 2021-01-14 NOTE — TELEPHONE ENCOUNTER
Routing refill request to provider for review/approval because:  Labs not current:  K, uric, CBC, ALT    Labs cued    Kalpana eNal RN   [facility]

## 2021-01-18 NOTE — TELEPHONE ENCOUNTER
STAR Reception - please offer lab only    Signed Prescriptions:                        Disp   Refills    triamterene-HCTZ (MAXZIDE-25) 37.5-25 MG t*90 tab*3        Sig: TAKE 1 TABLET BY MOUTH EVERY DAY  Authorizing Provider: RENA GARCIA    allopurinol (ZYLOPRIM) 100 MG tablet       90 tab*3        Sig: TAKE 1 TABLET BY MOUTH EVERY DAY  Authorizing Provider: RENA GARCIA    KLOR-CON 10 MEQ CR tablet                  90 tab*3        Sig: TAKE 1 TABLET BY MOUTH EVERY DAY  Authorizing Provider: RENA GARCIA

## 2021-01-26 DIAGNOSIS — I10 ESSENTIAL HYPERTENSION WITH GOAL BLOOD PRESSURE LESS THAN 140/90: ICD-10-CM

## 2021-01-26 DIAGNOSIS — M1A.2790 DRUG-INDUCED CHRONIC GOUT OF FOOT WITHOUT TOPHUS, UNSPECIFIED LATERALITY: ICD-10-CM

## 2021-01-26 LAB
ALBUMIN SERPL-MCNC: 3.2 G/DL (ref 3.4–5)
ALP SERPL-CCNC: 110 U/L (ref 40–150)
ALT SERPL W P-5'-P-CCNC: 29 U/L (ref 0–50)
ANION GAP SERPL CALCULATED.3IONS-SCNC: 3 MMOL/L (ref 3–14)
AST SERPL W P-5'-P-CCNC: 25 U/L (ref 0–45)
BILIRUB SERPL-MCNC: 0.6 MG/DL (ref 0.2–1.3)
BUN SERPL-MCNC: 22 MG/DL (ref 7–30)
CALCIUM SERPL-MCNC: 10 MG/DL (ref 8.5–10.1)
CHLORIDE SERPL-SCNC: 105 MMOL/L (ref 94–109)
CO2 SERPL-SCNC: 31 MMOL/L (ref 20–32)
CREAT SERPL-MCNC: 0.78 MG/DL (ref 0.52–1.04)
ERYTHROCYTE [DISTWIDTH] IN BLOOD BY AUTOMATED COUNT: 14.4 % (ref 10–15)
GFR SERPL CREATININE-BSD FRML MDRD: 72 ML/MIN/{1.73_M2}
GLUCOSE SERPL-MCNC: 93 MG/DL (ref 70–99)
HCT VFR BLD AUTO: 39.7 % (ref 35–47)
HGB BLD-MCNC: 12.8 G/DL (ref 11.7–15.7)
MCH RBC QN AUTO: 29.6 PG (ref 26.5–33)
MCHC RBC AUTO-ENTMCNC: 32.2 G/DL (ref 31.5–36.5)
MCV RBC AUTO: 92 FL (ref 78–100)
PLATELET # BLD AUTO: 249 10E9/L (ref 150–450)
POTASSIUM SERPL-SCNC: 4.7 MMOL/L (ref 3.4–5.3)
PROT SERPL-MCNC: 7.3 G/DL (ref 6.8–8.8)
RBC # BLD AUTO: 4.32 10E12/L (ref 3.8–5.2)
SODIUM SERPL-SCNC: 139 MMOL/L (ref 133–144)
URATE SERPL-MCNC: 5.5 MG/DL (ref 2.6–6)
WBC # BLD AUTO: 7.7 10E9/L (ref 4–11)

## 2021-01-26 PROCEDURE — 36415 COLL VENOUS BLD VENIPUNCTURE: CPT | Performed by: FAMILY MEDICINE

## 2021-01-26 PROCEDURE — 84550 ASSAY OF BLOOD/URIC ACID: CPT | Performed by: FAMILY MEDICINE

## 2021-01-26 PROCEDURE — 80053 COMPREHEN METABOLIC PANEL: CPT | Performed by: FAMILY MEDICINE

## 2021-01-26 PROCEDURE — 85027 COMPLETE CBC AUTOMATED: CPT | Performed by: FAMILY MEDICINE

## 2021-01-26 NOTE — LETTER
February 15, 2021      Anandjaja Whitlock  [address]  [place] [address]        Dear ,    We are writing to inform you of your test results.    Your test results fall within the expected range(s) or remain unchanged from previous results.  Please continue with current treatment plan.    Resulted Orders   **Uric acid FUTURE 2mo   Result Value Ref Range    Uric Acid 5.5 2.6 - 6.0 mg/dL   **CBC with platelets FUTURE anytime   Result Value Ref Range    WBC 7.7 4.0 - 11.0 10e9/L    RBC Count 4.32 3.8 - 5.2 10e12/L    Hemoglobin 12.8 11.7 - 15.7 g/dL    Hematocrit 39.7 35.0 - 47.0 %    MCV 92 78 - 100 fl    MCH 29.6 26.5 - 33.0 pg    MCHC 32.2 31.5 - 36.5 g/dL    RDW 14.4 10.0 - 15.0 %    Platelet Count 249 150 - 450 10e9/L   Comprehensive metabolic panel   Result Value Ref Range    Sodium 139 133 - 144 mmol/L    Potassium 4.7 3.4 - 5.3 mmol/L    Chloride 105 94 - 109 mmol/L    Carbon Dioxide 31 20 - 32 mmol/L    Anion Gap 3 3 - 14 mmol/L    Glucose 93 70 - 99 mg/dL    Urea Nitrogen 22 7 - 30 mg/dL    Creatinine 0.78 0.52 - 1.04 mg/dL    GFR Estimate 72 >60 mL/min/[1.73_m2]      Comment:      Non  GFR Calc  Starting 12/18/2018, serum creatinine based estimated GFR (eGFR) will be   calculated using the Chronic Kidney Disease Epidemiology Collaboration   (CKD-EPI) equation.      GFR Estimate If Black 84 >60 mL/min/[1.73_m2]      Comment:       GFR Calc  Starting 12/18/2018, serum creatinine based estimated GFR (eGFR) will be   calculated using the Chronic Kidney Disease Epidemiology Collaboration   (CKD-EPI) equation.      Calcium 10.0 8.5 - 10.1 mg/dL    Bilirubin Total 0.6 0.2 - 1.3 mg/dL    Albumin 3.2 (L) 3.4 - 5.0 g/dL    Protein Total 7.3 6.8 - 8.8 g/dL    Alkaline Phosphatase 110 40 - 150 U/L    ALT 29 0 - 50 U/L    AST 25 0 - 45 U/L       If you have any questions or concerns, please call the clinic at the number listed above.       Sincerely,      Pedro  Gt Mcclelland MD

## 2021-02-25 DIAGNOSIS — B37.31 CANDIDAL VULVOVAGINITIS: ICD-10-CM

## 2021-02-25 RX ORDER — TRIAMCINOLONE ACETONIDE 1 MG/G
CREAM TOPICAL DAILY PRN
Qty: 30 G | Refills: 1 | Status: SHIPPED | OUTPATIENT
Start: 2021-02-25 | End: 2022-05-26

## 2021-02-25 NOTE — TELEPHONE ENCOUNTER
Signed Prescriptions:                        Disp   Refills    triamcinolone (KENALOG) 0.1 % external cre*30 g   1        Sig: APPLY TOPICALLY DAILY AS NEEDED FOR IRRITATION  Authorizing Provider: RENA GARCIA  Ordering User: ROLA BARTLETT RN  [facility]

## 2021-05-17 DIAGNOSIS — G47.00 INSOMNIA, UNSPECIFIED TYPE: ICD-10-CM

## 2021-05-17 NOTE — TELEPHONE ENCOUNTER
Requested Prescriptions   Pending Prescriptions Disp Refills     zolpidem (AMBIEN) 5 MG tablet [Pharmacy Med Name: ZOLPIDEM TARTRATE 5 MG TABLET] 30 tablet 0     Sig: TAKE 1 TABLET (5 MG) BY MOUTH NIGHTLY AS NEEDED FOR SLEEP       There is no refill protocol information for this order        Routing refill request to provider for review/approval because:  Drug not on the [facility] refill protocol

## 2021-05-18 RX ORDER — ZOLPIDEM TARTRATE 5 MG/1
5 TABLET ORAL
Qty: 30 TABLET | Refills: 0 | Status: SHIPPED | OUTPATIENT
Start: 2021-05-18 | End: 2021-06-20

## 2021-05-28 ENCOUNTER — TRANSFERRED RECORDS (OUTPATIENT)
Dept: HEALTH INFORMATION MANAGEMENT | Facility: CLINIC | Age: 76
End: 2021-05-28

## 2021-06-30 ENCOUNTER — OFFICE VISIT (OUTPATIENT)
Dept: FAMILY MEDICINE | Facility: CLINIC | Age: 76
End: 2021-06-30
Payer: MEDICARE

## 2021-06-30 VITALS
HEART RATE: 72 BPM | HEIGHT: 60 IN | TEMPERATURE: 97 F | DIASTOLIC BLOOD PRESSURE: 68 MMHG | WEIGHT: 210 LBS | SYSTOLIC BLOOD PRESSURE: 128 MMHG | BODY MASS INDEX: 41.23 KG/M2 | OXYGEN SATURATION: 95 %

## 2021-06-30 DIAGNOSIS — E78.49 OTHER HYPERLIPIDEMIA: ICD-10-CM

## 2021-06-30 DIAGNOSIS — E78.5 HYPERLIPIDEMIA LDL GOAL <130: ICD-10-CM

## 2021-06-30 DIAGNOSIS — I89.0 LYMPHEDEMA OF BOTH LOWER EXTREMITIES: ICD-10-CM

## 2021-06-30 DIAGNOSIS — Z00.00 ENCOUNTER FOR MEDICARE ANNUAL WELLNESS EXAM: ICD-10-CM

## 2021-06-30 DIAGNOSIS — M17.12 PRIMARY OSTEOARTHRITIS OF LEFT KNEE: ICD-10-CM

## 2021-06-30 DIAGNOSIS — E55.9 VITAMIN D DEFICIENCY: ICD-10-CM

## 2021-06-30 DIAGNOSIS — Z00.00 ENCOUNTER FOR MEDICARE ANNUAL WELLNESS EXAM: Primary | ICD-10-CM

## 2021-06-30 DIAGNOSIS — I10 ESSENTIAL HYPERTENSION WITH GOAL BLOOD PRESSURE LESS THAN 140/90: ICD-10-CM

## 2021-06-30 LAB — VIT B12 SERPL-MCNC: 783 PG/ML (ref 193–986)

## 2021-06-30 PROCEDURE — 36415 COLL VENOUS BLD VENIPUNCTURE: CPT | Performed by: FAMILY MEDICINE

## 2021-06-30 PROCEDURE — 82607 VITAMIN B-12: CPT | Performed by: FAMILY MEDICINE

## 2021-06-30 PROCEDURE — G0439 PPPS, SUBSEQ VISIT: HCPCS | Performed by: FAMILY MEDICINE

## 2021-06-30 PROCEDURE — 90715 TDAP VACCINE 7 YRS/> IM: CPT | Performed by: FAMILY MEDICINE

## 2021-06-30 PROCEDURE — 82306 VITAMIN D 25 HYDROXY: CPT | Performed by: FAMILY MEDICINE

## 2021-06-30 PROCEDURE — 80053 COMPREHEN METABOLIC PANEL: CPT | Performed by: FAMILY MEDICINE

## 2021-06-30 PROCEDURE — 90471 IMMUNIZATION ADMIN: CPT | Performed by: FAMILY MEDICINE

## 2021-06-30 PROCEDURE — 80061 LIPID PANEL: CPT | Performed by: FAMILY MEDICINE

## 2021-06-30 ASSESSMENT — MIFFLIN-ST. JEOR: SCORE: 1354.05

## 2021-06-30 NOTE — PROGRESS NOTES
"  SUBJECTIVE:   Kyler Whitlock is a 76 year old female who presents for Preventive Visit.    Patient has been advised of split billing requirements and indicates understanding: Yes  Are you in the first 12 months of your Medicare Part B coverage?  No    Physical Health:    In general, how would you rate your overall physical health? good    Outside of work, how many days during the week do you exercise? 1 day/week    Outside of work, approximately how many minutes a day do you exercise?less than 15 minutes    If you drink alcohol do you typically have >3 drinks per day or >7 drinks per week? Not Applicable    Do you usually eat at least 4 servings of fruit and vegetables a day, include whole grains & fiber and avoid regularly eating high fat or \"junk\" foods? Yes    Do you have any problems taking medications regularly?  No    Do you have any side effects from medications? none    Needs assistance for the following daily activities: no assistance needed    Which of the following safety concerns are present in your home?  none identified     Hearing impairment: Yes, Find that men's voices are easier to understand than women's.    In the past 6 months, have you been bothered by leaking of urine? no    Mental Health:    In general, how would you rate your overall mental or emotional health? good  PHQ-2 Score:      Do you feel safe in your environment? Yes    Have you ever done Advance Care Planning? (For example, a Health Directive, POLST, or a discussion with a medical provider or your loved ones about your wishes): No, advance care planning information given to patient to review.  Patient declined advance care planning discussion at this time.    Additional concerns to address?  No    Fall risk:     click delete button to remove this line now  Cognitive Screenin) Repeat 3 items (Leader, Season, Table)   2) Clock draw: NORMAL  3) 3 item recall: Recalls 3 objects  Results: NORMAL clock, 1-2 items recalled: " COGNITIVE IMPAIRMENT LESS LIKELY    Mini-CogTM Copyright FRANCOISE Quintero. Licensed by the author for use in [facility]; reprinted with permission (oliver@.[facility]). All rights reserved.      Do you have sleep apnea, excessive snoring or daytime drowsiness?: yes      Joint Pain    Onset: Years    Description:   Location: left knee  Character: Sharp and Stabbing    Intensity: Moderate to severe    Progression of Symptoms: worse, intermittent    Accompanying Signs & Symptoms:  Other symptoms: weakness of left leg, limps    History:   Previous similar pain: YES      Precipitating factors:   Trauma or overuse: No recent falls, tries to limit how long she stands or walks    Alleviating factors:  Improved by: Nothing other than resting when it gets sore  Therapies Tried and outcome: Previous steroid injections, limited benefit.  Asked for and received Synvisc injections in both knees earlier this year per Dr. Castillo without benefit    Hyperlipidemia Follow-Up      Are you regularly taking any medication or supplement to lower your cholesterol?   No    Are you having muscle aches or other side effects that you think could be caused by your cholesterol lowering medication?  Would rather not start a medication that might cause her muscle aches    Hypertension Follow-up      Do you check your blood pressure regularly outside of the clinic? No     Are you following a low salt diet? Yes    Are your blood pressures ever more than 140 on the top number (systolic) OR more   than 90 on the bottom number (diastolic), for example 140/90? ?    Chronic Pain Follow-Up    Where in your body do you have pain?  Right foot greater than left, both legs  How has your pain affected your ability to work? 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 Other: Diuretics, has to urinate too often.  Unable to get  support hose on and off not much different in the morning  How well are you sleeping? Poor  How has your mood been since your last visit? About the same  Have you had a significant life event? Health Concerns  Other aggravating factors: sedentary lifestyle  Taking medication as directed? Yes, gabapentin 300 mg usually 4 times daily, helps    PHQ-9 SCORE 11/11/2016 12/5/2018   PHQ-9 Total Score 7 8     MARIOLA-7 SCORE 11/11/2016   Total Score 1     No flowsheet data found.  Encounter-Level CSA:    There are no encounter-level csa.     Patient-Level CSA:    There are no patient-level csa.         Reviewed and updated as needed this visit by clinical staff  Tobacco  Allergies  Meds              Reviewed and updated as needed this visit by Provider                Social History     Tobacco Use     Smoking status: Never Smoker     Smokeless tobacco: Never Used   Substance Use Topics     Alcohol use: No                           Current providers sharing in care for this patient include:  Patient Care Team:  Pedro Mcclelland MD as PCP - General (Family Practice)  Pedro Mcclelland MD as Assigned PCP    The following health maintenance items are reviewed in Epic and correct as of today:  Health Maintenance   Topic Date Due     DEXA  Never done     URINE DRUG SCREEN  Never done     HEPATITIS C SCREENING  Never done     ZOSTER IMMUNIZATION (1 of 2) Never done     FALL RISK ASSESSMENT  04/29/2020     DTAP/TDAP/TD IMMUNIZATION (2 - Td) 04/14/2021     INFLUENZA VACCINE (Season Ended) 09/01/2021     MEDICARE ANNUAL WELLNESS VISIT  06/30/2022     LIPID  12/06/2024     ADVANCE CARE PLANNING  06/30/2026     PHQ-2  Completed     Pneumococcal Vaccine: 65+ Years  Completed     COVID-19 Vaccine  Completed     IPV IMMUNIZATION  Aged Out     MENINGITIS IMMUNIZATION  Aged Out     HEPATITIS B IMMUNIZATION  Aged Out     Lab work is in process  Labs reviewed in EPIC    ROS:  Constitutional, HEENT, cardiovascular, pulmonary, gi and gu  systems are negative, except as otherwise noted.    OBJECTIVE:   /68   Pulse 72   Temp 97  F (36.1  C) (Temporal)   Ht 1.524 m (5')   Wt 95.3 kg (210 lb)   SpO2 95%   BMI 41.01 kg/m   Estimated body mass index is 41.01 kg/m  as calculated from the following:    Height as of this encounter: 1.524 m (5').    Weight as of this encounter: 95.3 kg (210 lb).  EXAM:   GENERAL APPEARANCE: morbidly obese and fatigued  EYES: Eyes grossly normal to inspection, PERRL and conjunctivae and sclerae normal  HENT: ear canals and TM's normal, nose and mouth without ulcers or lesions, oropharynx clear and oral mucous membranes moist  NECK: no adenopathy, no asymmetry, masses, or scars and thyroid normal to palpation  RESP: lungs clear to auscultation - no rales, rhonchi or wheezes  CV: regular rate and rhythm, normal S1 S2, no S3 or S4, no murmur, click or rub, no peripheral edema and peripheral pulses strong  ABDOMEN: soft, nontender, no hepatosplenomegaly, no masses and bowel sounds normal  MS: RLE exam reveals lymphedema with 1+ edema over the top of the right foot, LLE exam reveals lymphedema  SKIN: At patient's request a large blackhead (3 mm) over the right shoulder blade was carefully expressed and removed with some pressure, covered with bacitracin and a Band-Aid  NEURO: Normal strength and tone, mentation intact, speech normal, DTR's normal and symmetric, gait abnormal: Wide-based, uses cane and sensory deficit poor sensation in both feet, skin intact  PSYCH: mentation appears normal and affect flat    Diagnostic Test Results:  Labs reviewed in Epic    ASSESSMENT / PLAN:       ICD-10-CM    1. Encounter for Medicare annual wellness exam  Z00.00 **Vitamin B12 FUTURE 2mo   2. Lymphedema of both lower extremities  I89.0 Comprehensive metabolic panel   3. Primary osteoarthritis of left knee  M17.12    4. Essential hypertension with goal blood pressure less than 140/90  I10    5. Hyperlipidemia LDL goal <130  E78.5  Lipid panel reflex to direct LDL Fasting   6. BMI 40.0-44.9, adult (H)  Z68.41        Patient has been advised of split billing requirements and indicates understanding: Yes    COUNSELING:  Reviewed preventive health counseling, as reflected in patient instructions       Healthy diet/nutrition       Fall risk prevention       Osteoporosis prevention/bone health    Estimated body mass index is 41.01 kg/m  as calculated from the following:    Height as of this encounter: 1.524 m (5').    Weight as of this encounter: 95.3 kg (210 lb).    Weight management plan: Discussed healthy diet and exercise guidelines    She reports that she has never smoked. She has never used smokeless tobacco.    Appropriate preventive services were discussed with this patient, including applicable screening as appropriate for cardiovascular disease, diabetes, osteopenia/osteoporosis, and glaucoma.  As appropriate for age/gender, discussed screening for colorectal cancer, prostate cancer, breast cancer, and cervical cancer. Checklist reviewing preventive services available has been given to the patient.    Reviewed patients plan of care and provided an AVS. The Basic Care Plan (routine screening as documented in Health Maintenance) for Kyler meets the Care Plan requirement. This Care Plan has been established and reviewed with the Patient.    Pedro Mcclelland MD  [facility]

## 2021-06-30 NOTE — PATIENT INSTRUCTIONS
Will contact with labs.    Get help to set up [employer]    The 10-year ASCVD risk score (Prasanth SIDDIQI Jr., et al., 2013) is: 28.8%    Values used to calculate the score:      Age: 76 years      Sex: Female      Is Non- : No      Diabetic: No      Tobacco smoker: No      Systolic Blood Pressure: 128 mmHg      Is BP treated: Yes      HDL Cholesterol: 65 mg/dL      Total Cholesterol: 222 mg/dL      Patient Education   Personalized Prevention Plan  You are due for the preventive services outlined below.  Your care team is available to assist you in scheduling these services.  If you have already completed any of these items, please share that information with your care team to update in your medical record.  Health Maintenance Due   Topic Date Due     Osteoporosis Screening  Never done     URINE DRUG SCREEN  Never done     Discuss Advance Care Planning  Never done     Hepatitis C Screening  Never done     Zoster (Shingles) Vaccine (1 of 2) Never done     Annual Wellness Visit  04/29/2020     FALL RISK ASSESSMENT  04/29/2020     PHQ-2  01/01/2021     Diptheria Tetanus Pertussis (DTAP/TDAP/TD) Vaccine (2 - Td) 04/14/2021

## 2021-07-01 LAB
ALBUMIN SERPL-MCNC: 3.9 G/DL (ref 3.4–5)
ALP SERPL-CCNC: 95 U/L (ref 40–150)
ALT SERPL W P-5'-P-CCNC: 28 U/L (ref 0–50)
ANION GAP SERPL CALCULATED.3IONS-SCNC: 4 MMOL/L (ref 3–14)
AST SERPL W P-5'-P-CCNC: 29 U/L (ref 0–45)
BILIRUB SERPL-MCNC: 0.6 MG/DL (ref 0.2–1.3)
BUN SERPL-MCNC: 20 MG/DL (ref 7–30)
CALCIUM SERPL-MCNC: 10.2 MG/DL (ref 8.5–10.1)
CHLORIDE SERPL-SCNC: 103 MMOL/L (ref 94–109)
CHOLEST SERPL-MCNC: 258 MG/DL
CO2 SERPL-SCNC: 33 MMOL/L (ref 20–32)
CREAT SERPL-MCNC: 0.99 MG/DL (ref 0.52–1.04)
DEPRECATED CALCIDIOL+CALCIFEROL SERPL-MC: 58 UG/L (ref 20–75)
GFR SERPL CREATININE-BSD FRML MDRD: 54 ML/MIN/{1.73_M2}
GLUCOSE SERPL-MCNC: 109 MG/DL (ref 70–99)
HDLC SERPL-MCNC: 61 MG/DL
LDLC SERPL CALC-MCNC: 175 MG/DL
NONHDLC SERPL-MCNC: 197 MG/DL
POTASSIUM SERPL-SCNC: 4.6 MMOL/L (ref 3.4–5.3)
PROT SERPL-MCNC: 7.8 G/DL (ref 6.8–8.8)
SODIUM SERPL-SCNC: 140 MMOL/L (ref 133–144)
TRIGL SERPL-MCNC: 110 MG/DL

## 2021-07-01 RX ORDER — INFLUENZA A VIRUS A/MICHIGAN/45/2015 X-275 (H1N1) ANTIGEN (FORMALDEHYDE INACTIVATED), INFLUENZA A VIRUS A/SINGAPORE/INFIMH-16-0019/2016 IVR-186 (H3N2) ANTIGEN (FORMALDEHYDE INACTIVATED), INFLUENZA B VIRUS B/PHUKET/3073/2013 ANTIGEN (FORMALDEHYDE INACTIVATED), AND INFLUENZA B VIRUS B/MARYLAND/15/2016 BX-69A ANTIGEN (FORMALDEHYDE INACTIVATED) 60; 60; 60; 60 UG/.7ML; UG/.7ML; UG/.7ML; UG/.7ML
INJECTION, SUSPENSION INTRAMUSCULAR
COMMUNITY
Start: 2020-09-09

## 2021-07-15 NOTE — RESULT ENCOUNTER NOTE
Please notify patient-she would like a copy of labs sent:     Joseph Solo,  Most of the labs are within normal limits; several that are just outside and not worrisome.  Please let me know if you change your mind about taking cholesterol medication as your risk for heart disease is rising.  Nice to see you, contact if questions.  Thanks Pedro    The 10-year ASCVD risk score (Prasanth SIDDIQI Jr., et al., 2013) is: 28.5%    Values used to calculate the score:      Age: 76 years      Sex: Female      Is Non- : No      Diabetic: No      Tobacco smoker: No      Systolic Blood Pressure: 128 mmHg      Is BP treated: Yes      HDL Cholesterol: 61 mg/dL      Total Cholesterol: 258 mg/dL

## 2021-07-19 ENCOUNTER — TELEPHONE (OUTPATIENT)
Dept: FAMILY MEDICINE | Facility: CLINIC | Age: 76
End: 2021-07-19

## 2021-07-19 DIAGNOSIS — E78.5 HYPERLIPIDEMIA LDL GOAL <130: Primary | ICD-10-CM

## 2021-07-19 DIAGNOSIS — G47.00 INSOMNIA, UNSPECIFIED TYPE: ICD-10-CM

## 2021-07-19 DIAGNOSIS — M15.9 OSTEOARTHRITIS OF MULTIPLE JOINTS, UNSPECIFIED OSTEOARTHRITIS TYPE: ICD-10-CM

## 2021-07-19 NOTE — TELEPHONE ENCOUNTER
Reason for Call:  Other call back    Detailed comments: Patient is requesting a call back to go over test results from 06/30/21.    Phone Number Patient can be reached at: Home number on file [phone] (home)    Best Time: Anytime    Can we leave a detailed message on this number? YES    Call taken on 7/19/2021 at 1:22 PM by Dashawn Henry

## 2021-07-19 NOTE — TELEPHONE ENCOUNTER
Dr Mcclelland    Pt called about the lab note from you for the labs of 6/30/21    She stated if you would like to have her start a statin she would be fine with that but  One she had started gave her joint aching it cleared up when she stopped the med, she thinks it was lipitor  She thinks the simvastatin caused her to have kidney function get off, the chart states this is the one that caused the joint issue    She also wanted to know if you thought she should have a stress test    Pharm cued     Ok to leave a message     Kalpana Neal RN   [facility]

## 2021-07-20 RX ORDER — PRAVASTATIN SODIUM 10 MG
10 TABLET ORAL DAILY
Qty: 30 TABLET | Refills: 11 | Status: SHIPPED | OUTPATIENT
Start: 2021-07-20 | End: 2021-08-16

## 2021-07-20 NOTE — TELEPHONE ENCOUNTER
Routing refill request to provider for review/approval because:  Drug not on the [facility] refill protocol   Lurdes RAE RN

## 2021-07-21 DIAGNOSIS — G62.9 PERIPHERAL POLYNEUROPATHY: ICD-10-CM

## 2021-07-21 RX ORDER — CYCLOBENZAPRINE HCL 10 MG
TABLET ORAL
Qty: 180 TABLET | Refills: 3 | Status: SHIPPED | OUTPATIENT
Start: 2021-07-21 | End: 2022-08-26

## 2021-07-21 RX ORDER — ZOLPIDEM TARTRATE 5 MG/1
5 TABLET ORAL
Qty: 30 TABLET | Refills: 5 | Status: SHIPPED | OUTPATIENT
Start: 2021-07-21 | End: 2022-01-22

## 2021-07-21 NOTE — TELEPHONE ENCOUNTER
Stress test looked good 2 years ago; unless she's having chest pain shouldn't need one. Pravastatin is an alternative: order signed, please notify, thanks Pedro

## 2021-07-22 RX ORDER — GABAPENTIN 300 MG/1
CAPSULE ORAL
Qty: 120 CAPSULE | Refills: 0 | Status: SHIPPED | OUTPATIENT
Start: 2021-07-22 | End: 2021-09-23

## 2021-08-13 DIAGNOSIS — E78.5 HYPERLIPIDEMIA LDL GOAL <130: ICD-10-CM

## 2021-08-16 RX ORDER — PRAVASTATIN SODIUM 10 MG
TABLET ORAL
Qty: 90 TABLET | Refills: 2 | Status: SHIPPED | OUTPATIENT
Start: 2021-08-16 | End: 2022-04-07

## 2021-08-16 NOTE — TELEPHONE ENCOUNTER
Prescription approved per [facility] Refill Protocol.    Kalpana Neal RN   [facility]

## 2021-09-12 DIAGNOSIS — I10 ESSENTIAL HYPERTENSION WITH GOAL BLOOD PRESSURE LESS THAN 140/90: ICD-10-CM

## 2021-09-13 RX ORDER — METOPROLOL SUCCINATE 50 MG/1
TABLET, EXTENDED RELEASE ORAL
Qty: 180 TABLET | Refills: 0 | Status: SHIPPED | OUTPATIENT
Start: 2021-09-13 | End: 2021-11-22

## 2021-09-13 NOTE — TELEPHONE ENCOUNTER
"Requested Prescriptions   Signed Prescriptions Disp Refills    metoprolol succinate ER (TOPROL-XL) 50 MG 24 hr tablet 180 tablet 0     Sig: TAKE 1 TABLET BY MOUTH TWICE A DAY       Beta-Blockers Protocol Passed - 9/12/2021 12:22 AM        Passed - Blood pressure under 140/90 in past 12 months     BP Readings from Last 3 Encounters:   06/30/21 128/68   03/04/20 114/76   12/06/19 110/80                 Passed - Patient is age 6 or older        Passed - Recent (12 mo) or future (30 days) visit within the authorizing provider's specialty     Patient has had an office visit with the authorizing provider or a provider within the authorizing providers department within the previous 12 mos or has a future within next 30 days. See \"Patient Info\" tab in inbasket, or \"Choose Columns\" in Meds & Orders section of the refill encounter.              Passed - Medication is active on med list           Violet Betancourt RN  [facility]     "

## 2021-10-28 DIAGNOSIS — I10 ESSENTIAL HYPERTENSION WITH GOAL BLOOD PRESSURE LESS THAN 140/90: ICD-10-CM

## 2021-10-28 RX ORDER — LOSARTAN POTASSIUM 50 MG/1
50 TABLET ORAL DAILY
Qty: 90 TABLET | Refills: 2 | Status: SHIPPED | OUTPATIENT
Start: 2021-10-28 | End: 2022-07-04

## 2021-10-28 NOTE — TELEPHONE ENCOUNTER
"Requested Prescriptions   Pending Prescriptions Disp Refills     losartan (COZAAR) 50 MG tablet [Pharmacy Med Name: LOSARTAN POTASSIUM 50 MG TAB] 90 tablet 0     Sig: TAKE 1 TABLET BY MOUTH EVERY DAY. MAKE APPOINTMENT FOR REFILLS!!       Angiotensin-II Receptors Passed - 10/28/2021  9:34 AM        Passed - Last blood pressure under 140/90 in past 12 months     BP Readings from Last 3 Encounters:   06/30/21 128/68   03/04/20 114/76   12/06/19 110/80                 Passed - Recent (12 mo) or future (30 days) visit within the authorizing provider's specialty     Patient has had an office visit with the authorizing provider or a provider within the authorizing providers department within the previous 12 mos or has a future within next 30 days. See \"Patient Info\" tab in inbasket, or \"Choose Columns\" in Meds & Orders section of the refill encounter.              Passed - Medication is active on med list        Passed - Patient is age 18 or older        Passed - No active pregnancy on record        Passed - Normal serum creatinine on file in past 12 months     Recent Labs   Lab Test 06/30/21  1223   CR 0.99       Ok to refill medication if creatinine is low          Passed - Normal serum potassium on file in past 12 months     Recent Labs   Lab Test 06/30/21  1223   POTASSIUM 4.6                    Passed - No positive pregnancy test in past 12 months           Routing refill request to provider for review/approval because:  Drug interaction alert needs initial provider review and override please - for triamterene-HCTZ (MAXZIDE-25) 37.5-25 MG tablet        "

## 2021-11-20 DIAGNOSIS — I10 ESSENTIAL HYPERTENSION WITH GOAL BLOOD PRESSURE LESS THAN 140/90: ICD-10-CM

## 2021-11-22 RX ORDER — METOPROLOL SUCCINATE 50 MG/1
TABLET, EXTENDED RELEASE ORAL
Qty: 180 TABLET | Refills: 0 | Status: SHIPPED | OUTPATIENT
Start: 2021-11-22 | End: 2022-02-14

## 2021-11-22 NOTE — TELEPHONE ENCOUNTER
"Requested Prescriptions   Signed Prescriptions Disp Refills    metoprolol succinate ER (TOPROL-XL) 50 MG 24 hr tablet 180 tablet 0     Sig: TAKE 1 TABLET BY MOUTH TWICE A DAY       Beta-Blockers Protocol Passed - 11/20/2021  9:14 PM        Passed - Blood pressure under 140/90 in past 12 months     BP Readings from Last 3 Encounters:   06/30/21 128/68   03/04/20 114/76   12/06/19 110/80                 Passed - Patient is age 6 or older        Passed - Recent (12 mo) or future (30 days) visit within the authorizing provider's specialty     Patient has had an office visit with the authorizing provider or a provider within the authorizing providers department within the previous 12 mos or has a future within next 30 days. See \"Patient Info\" tab in inbasket, or \"Choose Columns\" in Meds & Orders section of the refill encounter.              Passed - Medication is active on med list           Violet Betancourt RN  [facility]     "

## 2022-01-06 DIAGNOSIS — E87.6 HYPOKALEMIA: ICD-10-CM

## 2022-01-06 DIAGNOSIS — I10 ESSENTIAL HYPERTENSION WITH GOAL BLOOD PRESSURE LESS THAN 140/90: ICD-10-CM

## 2022-01-06 DIAGNOSIS — M1A.2790 DRUG-INDUCED CHRONIC GOUT OF FOOT WITHOUT TOPHUS, UNSPECIFIED LATERALITY: ICD-10-CM

## 2022-01-07 RX ORDER — ALLOPURINOL 100 MG/1
TABLET ORAL
Qty: 90 TABLET | Refills: 3 | Status: SHIPPED | OUTPATIENT
Start: 2022-01-07 | End: 2022-09-26

## 2022-01-07 RX ORDER — TRIAMTERENE/HYDROCHLOROTHIAZID 37.5-25 MG
TABLET ORAL
Qty: 90 TABLET | Refills: 3 | Status: SHIPPED | OUTPATIENT
Start: 2022-01-07 | End: 2022-09-26

## 2022-01-07 RX ORDER — POTASSIUM CHLORIDE 750 MG/1
TABLET, EXTENDED RELEASE ORAL
Qty: 90 TABLET | Refills: 3 | Status: SHIPPED | OUTPATIENT
Start: 2022-01-07 | End: 2022-09-26

## 2022-01-07 NOTE — TELEPHONE ENCOUNTER
"Requested Prescriptions   Signed Prescriptions Disp Refills    triamterene-HCTZ (MAXZIDE-25) 37.5-25 MG tablet 90 tablet 3     Sig: TAKE 1 TABLET BY MOUTH EVERY DAY       Diuretics (Including Combos) Protocol Passed - 1/6/2022  6:35 PM        Passed - Blood pressure under 140/90 in past 12 months     BP Readings from Last 3 Encounters:   06/30/21 128/68   03/04/20 114/76   12/06/19 110/80                 Passed - Recent (12 mo) or future (30 days) visit within the authorizing provider's specialty     Patient has had an office visit with the authorizing provider or a provider within the authorizing providers department within the previous 12 mos or has a future within next 30 days. See \"Patient Info\" tab in inbasket, or \"Choose Columns\" in Meds & Orders section of the refill encounter.              Passed - Medication is active on med list        Passed - Patient is age 18 or older        Passed - No active pregancy on record        Passed - Normal serum creatinine on file in past 12 months     Recent Labs   Lab Test 06/30/21  1223   CR 0.99              Passed - Normal serum potassium on file in past 12 months     Recent Labs   Lab Test 06/30/21  1223   POTASSIUM 4.6                    Passed - Normal serum sodium on file in past 12 months     Recent Labs   Lab Test 06/30/21  1223                 Passed - No positive pregnancy test in past 12 months          KLOR-CON 10 MEQ CR tablet 90 tablet 3     Sig: TAKE 1 TABLET BY MOUTH EVERY DAY       Potassium Supplements Protocol Passed - 1/6/2022  6:35 PM        Passed - Recent (12 mo) or future (30 days) visit within the authorizing provider's department     Patient has had an office visit with the authorizing provider or a provider within the authorizing providers department within the previous 12 mos or has a future within next 30 days. See \"Patient Info\" tab in inbasket, or \"Choose Columns\" in Meds & Orders section of the refill encounter.              Passed - " "Medication is active on med list        Passed - Patient is age 18 or older        Passed - Normal serum potassium in past 12 months     Recent Labs   Lab Test 06/30/21  1223   POTASSIUM 4.6                      allopurinol (ZYLOPRIM) 100 MG tablet 90 tablet 3     Sig: TAKE 1 TABLET BY MOUTH EVERY DAY       Gout Agents Protocol Passed - 1/6/2022  6:35 PM        Passed - CBC on file in past 12 months     Recent Labs   Lab Test 01/26/21  1117   WBC 7.7   RBC 4.32   HGB 12.8   HCT 39.7                    Passed - ALT on file in past 12 months     Recent Labs   Lab Test 06/30/21  1223   ALT 28             Passed - Has Uric Acid on file in past 12 months and value is less than 6     Recent Labs   Lab Test 01/26/21  1117   URIC 5.5     If level is 6mg/dL or greater, ok to refill one time and refer to provider.           Passed - Recent (12 mo) or future (30 days) visit within the authorizing provider's specialty     Patient has had an office visit with the authorizing provider or a provider within the authorizing providers department within the previous 12 mos or has a future within next 30 days. See \"Patient Info\" tab in inbasket, or \"Choose Columns\" in Meds & Orders section of the refill encounter.              Passed - Medication is active on med list        Passed - Patient is age 18 or older        Passed - No active pregnancy on record        Passed - Normal serum creatinine on file in the past 12 months     Recent Labs   Lab Test 06/30/21  1223   CR 0.99       Ok to refill medication if creatinine is low          Passed - No positive pregnancy test in past year           Violet Betancourt RN  [facility]     "

## 2022-02-12 DIAGNOSIS — I10 ESSENTIAL HYPERTENSION WITH GOAL BLOOD PRESSURE LESS THAN 140/90: ICD-10-CM

## 2022-02-14 RX ORDER — METOPROLOL SUCCINATE 50 MG/1
TABLET, EXTENDED RELEASE ORAL
Qty: 180 TABLET | Refills: 0 | Status: SHIPPED | OUTPATIENT
Start: 2022-02-14 | End: 2022-08-26

## 2022-02-14 NOTE — TELEPHONE ENCOUNTER
Prescription approved per [facility] Refill Protocol.    Kalpana Neal RN   [facility]

## 2022-03-21 DIAGNOSIS — G62.9 PERIPHERAL POLYNEUROPATHY: ICD-10-CM

## 2022-03-21 DIAGNOSIS — G47.00 INSOMNIA, UNSPECIFIED TYPE: ICD-10-CM

## 2022-03-21 RX ORDER — GABAPENTIN 300 MG/1
CAPSULE ORAL
Qty: 360 CAPSULE | Refills: 0 | Status: SHIPPED | OUTPATIENT
Start: 2022-03-21 | End: 2022-03-30

## 2022-03-21 RX ORDER — ZOLPIDEM TARTRATE 5 MG/1
5 TABLET ORAL
Qty: 30 TABLET | Refills: 2 | Status: SHIPPED | OUTPATIENT
Start: 2022-03-21 | End: 2022-03-30

## 2022-03-21 NOTE — TELEPHONE ENCOUNTER
Patient requesting medication refill.  gabapentin (NEURONTIN) 300 MG capsule   1 capule 800mg instead of 2 capsules of 300mg    zolpidem (AMBIEN) 5 MG tablet   90 day supply     Patient is Leaving for [place] 4/23     Please send to  [facility] [id number] - [place], [place] - [address] [place] [id number]    Upcomming appointment 03/30

## 2022-03-30 ENCOUNTER — VIRTUAL VISIT (OUTPATIENT)
Dept: FAMILY MEDICINE | Facility: CLINIC | Age: 77
End: 2022-03-30
Payer: MEDICARE

## 2022-03-30 DIAGNOSIS — G62.9 PERIPHERAL POLYNEUROPATHY: Primary | ICD-10-CM

## 2022-03-30 DIAGNOSIS — I10 ESSENTIAL HYPERTENSION WITH GOAL BLOOD PRESSURE LESS THAN 140/90: ICD-10-CM

## 2022-03-30 DIAGNOSIS — G47.00 INSOMNIA, UNSPECIFIED TYPE: ICD-10-CM

## 2022-03-30 DIAGNOSIS — E78.5 HYPERLIPIDEMIA LDL GOAL <130: ICD-10-CM

## 2022-03-30 DIAGNOSIS — M10.00 IDIOPATHIC GOUT, UNSPECIFIED CHRONICITY, UNSPECIFIED SITE: ICD-10-CM

## 2022-03-30 PROCEDURE — 99214 OFFICE O/P EST MOD 30 MIN: CPT | Mod: 95 | Performed by: FAMILY MEDICINE

## 2022-03-30 RX ORDER — GABAPENTIN 300 MG/1
600 CAPSULE ORAL 2 TIMES DAILY PRN
Qty: 360 CAPSULE | Refills: 1 | Status: SHIPPED | OUTPATIENT
Start: 2022-03-30 | End: 2022-05-26

## 2022-03-30 RX ORDER — ZOLPIDEM TARTRATE 5 MG/1
5 TABLET ORAL
Qty: 90 TABLET | Refills: 1 | Status: SHIPPED | OUTPATIENT
Start: 2022-03-30 | End: 2022-10-20

## 2022-03-30 RX ORDER — NORTRIPTYLINE HCL 10 MG
10 CAPSULE ORAL 2 TIMES DAILY
Qty: 180 CAPSULE | Refills: 1 | Status: SHIPPED | OUTPATIENT
Start: 2022-03-30 | End: 2022-09-26

## 2022-03-30 NOTE — PROGRESS NOTES
Germania is a 77 year old who is being evaluated via a billable telephone visit.      What phone number would you like to be contacted at? mobile  How would you like to obtain your AVS? Mail a copy    Assessment & Plan     Peripheral polyneuropathy  Worse- add to gabapentin  - gabapentin (NEURONTIN) 300 MG capsule; Take 2 capsules (600 mg) by mouth 2 times daily as needed for neuropathic pain  - nortriptyline (PAMELOR) 10 MG capsule; Take 1 capsule (10 mg) by mouth 2 times daily    Insomnia, unspecified type  stable  - zolpidem (AMBIEN) 5 MG tablet; Take 1 tablet (5 mg) by mouth nightly as needed for sleep    Essential hypertension with goal blood pressure less than 140/90  At goal     Hyperlipidemia LDL goal <130  At goal     BMI 40.0-44.9, adult (H)  stable    Review of external notes as documented elsewhere in note  Ordering of each unique test  Prescription drug management  30 minutes spent on the date of the encounter doing chart review, history and exam, documentation and further activities per the note     BMI:   Estimated body mass index is 41.01 kg/m  as calculated from the following:    Height as of 6/30/21: 1.524 m (5').    Weight as of 6/30/21: 95.3 kg (210 lb).   Weight management plan: Discussed healthy diet and exercise guidelines    Patient Instructions   Continue same medications, followup 3 months for PE        Return in about 3 months (around 6/30/2022) for Physical Exam, Lab Work.    Pedro Mcclelland MD  [facility]    Paulo Solo is a 77 year old who presents for the following health issues    HPI     Insomnia  Onset/Duration: years   Description:   Frequency of insomnia:  nightly  Time to fall asleep (sleep latency): varies  Middle of night awakening:  YES  Early morning awakening:  YES  Progression of Symptoms:  same and intermittent  Accompanying Signs & Symptoms:  Daytime sleepiness/napping: YES- some  Excessive snoring/apnea: YES- some  Restless legs:  no  Waking to urinate: YES- every hr  Chronic pain:  YES- some  Depression symptoms (if yes, do PHQ9): no  Anxiety symptoms (if yes, do MARIOLA-7): no  History:  Prior Insomnia: YES  New stressful situation: no  Precipitating factors:   Caffeine intake: some  OTC decongestants: no  Any new medications: no  Alleviating factors:  Self medicating (alcohol, etc.):  no  Stress-reduction (exercise, yoga, meditation etc): no  Therapies tried and outcome: Ambien -  usually effective      Hyperlipidemia Follow-Up      Are you regularly taking any medication or supplement to lower your cholesterol?   Yes- atorvastatin    Are you having muscle aches or other side effects that you think could be caused by your cholesterol lowering medication?  No    Hypertension Follow-up      Do you check your blood pressure regularly outside of the clinic? No     Are you following a low salt diet? Yes    Are your blood pressures ever more than 140 on the top number (systolic) OR more   than 90 on the bottom number (diastolic), for example 140/90? Yes    Pain History:  When did you first notice your pain? - More than 6 weeks   Have you seen this provider for your pain in the past?   Yes   Where in your body do you have pain? Feet, ankles, knees, hips  Are you seeing anyone else for your pain? No    Chronic Pain Follow Up:    Location of pain: legs  Analgesia/pain control:    - Recent changes:  no    - Overall control: Tolerable with discomfort    - Current treatments: nortriptyline   Adherence:     - Do you ever take more pain medicine than prescribed? No    - When did you take your last dose of pain medicine?     Adverse effects: No   PDMP Review       Value Time User    State PDMP site checked  Yes 4/15/2021 12:16 PM Pedro Mcclelland MD        Last CSA Agreement:   CSA -- Patient Level:    CSA: None found at the patient level.       Last UDS:     How many days per week do you miss taking your medication? 0      Review of Systems    Constitutional, HEENT, cardiovascular, pulmonary, gi and gu systems are negative, except as otherwise noted.      Objective           Vitals:  No vitals were obtained today due to virtual visit.    Physical Exam   mild distress and over weight  PSYCH: Alert and oriented times 3; coherent speech, normal   rate and volume, able to articulate logical thoughts, able   to abstract reason, no tangential thoughts, no hallucinations   or delusions  Her affect is flat and anxious  RESP: No cough, no audible wheezing, able to talk in full sentences  Remainder of exam unable to be completed due to telephone visits        Phone call duration: 30 minutes

## 2022-03-31 PROBLEM — M10.00 IDIOPATHIC GOUT, UNSPECIFIED CHRONICITY, UNSPECIFIED SITE: Status: ACTIVE | Noted: 2022-03-31

## 2022-04-07 DIAGNOSIS — E78.5 HYPERLIPIDEMIA LDL GOAL <130: ICD-10-CM

## 2022-04-07 RX ORDER — PRAVASTATIN SODIUM 10 MG
TABLET ORAL
Qty: 90 TABLET | Refills: 2 | Status: SHIPPED | OUTPATIENT
Start: 2022-04-07 | End: 2022-09-26

## 2022-04-07 NOTE — TELEPHONE ENCOUNTER
"Requested Prescriptions   Signed Prescriptions Disp Refills    pravastatin (PRAVACHOL) 10 MG tablet 90 tablet 2     Sig: TAKE 1 TABLET BY MOUTH EVERY DAY       Statins Protocol Passed - 4/7/2022  7:57 AM        Passed - LDL on file in past 12 months     Recent Labs   Lab Test 06/30/21  1223   *             Passed - No abnormal creatine kinase in past 12 months     No lab results found.             Passed - Recent (12 mo) or future (30 days) visit within the authorizing provider's specialty     Patient has had an office visit with the authorizing provider or a provider within the authorizing providers department within the previous 12 mos or has a future within next 30 days. See \"Patient Info\" tab in inbasket, or \"Choose Columns\" in Meds & Orders section of the refill encounter.              Passed - Medication is active on med list        Passed - Patient is age 18 or older        Passed - No active pregnancy on record        Passed - No positive pregnancy test in past 12 months           Violet Betancourt RN  [place]     "

## 2022-05-25 DIAGNOSIS — B37.31 CANDIDAL VULVOVAGINITIS: ICD-10-CM

## 2022-05-26 DIAGNOSIS — G62.9 PERIPHERAL POLYNEUROPATHY: ICD-10-CM

## 2022-05-26 RX ORDER — TRIAMCINOLONE ACETONIDE 1 MG/G
CREAM TOPICAL DAILY PRN
Qty: 30 G | Refills: 1 | Status: SHIPPED | OUTPATIENT
Start: 2022-05-26 | End: 2022-10-20

## 2022-05-26 RX ORDER — GABAPENTIN 300 MG/1
600 CAPSULE ORAL 2 TIMES DAILY PRN
Qty: 360 CAPSULE | Refills: 1 | Status: SHIPPED | OUTPATIENT
Start: 2022-05-26 | End: 2023-03-14

## 2022-05-26 NOTE — TELEPHONE ENCOUNTER
"Requested Prescriptions   Pending Prescriptions Disp Refills     triamcinolone (KENALOG) 0.1 % external cream [Pharmacy Med Name: TRIAMCINOLONE 0.1% CREAM] 30 g 1     Sig: APPLY TOPICALLY DAILY AS NEEDED FOR IRRITATION       Topical Steroids and Nonsteroidals Protocol Passed - 5/25/2022  9:13 PM        Passed - Patient is age 6 or older        Passed - Authorizing prescriber's most recent note related to this medication read.     If refill request is for ophthalmic use, please forward request to provider for approval.          Passed - High potency steroid not ordered        Passed - Recent (12 mo) or future (30 days) visit within the authorizing provider's specialty     Patient has had an office visit with the authorizing provider or a provider within the authorizing providers department within the previous 12 mos or has a future within next 30 days. See \"Patient Info\" tab in inbasket, or \"Choose Columns\" in Meds & Orders section of the refill encounter.              Passed - Medication is active on med list           Refilled per protocol.  Rosario GALLEGO RN    "

## 2022-05-26 NOTE — TELEPHONE ENCOUNTER
Reason for Call:  Medication or medication refill:    Do you use a [facility]?  Name of the pharmacy and phone number for the current request: [facility] [id number] [place] [facility], [place] - [address] [place] [address]    Name of the medication requested: gabapentin (NEURONTIN) 300 MG capsule    Other request: N/A    Can we leave a detailed message on this number? YES    Phone number patient can be reached at: Home number on file [phone] (home)    Best Time: ANY    Call taken on 5/26/2022 at 10:17 AM by Lisa Solorio

## 2022-07-03 DIAGNOSIS — I10 ESSENTIAL HYPERTENSION WITH GOAL BLOOD PRESSURE LESS THAN 140/90: ICD-10-CM

## 2022-07-04 RX ORDER — LOSARTAN POTASSIUM 50 MG/1
TABLET ORAL
Qty: 90 TABLET | Refills: 2 | Status: SHIPPED | OUTPATIENT
Start: 2022-07-04 | End: 2022-09-26

## 2022-09-26 ENCOUNTER — OFFICE VISIT (OUTPATIENT)
Dept: FAMILY MEDICINE | Facility: CLINIC | Age: 77
End: 2022-09-26
Payer: MEDICARE

## 2022-09-26 ENCOUNTER — LAB (OUTPATIENT)
Dept: LAB | Facility: CLINIC | Age: 77
End: 2022-09-26

## 2022-09-26 ENCOUNTER — TELEPHONE (OUTPATIENT)
Dept: FAMILY MEDICINE | Facility: CLINIC | Age: 77
End: 2022-09-26

## 2022-09-26 VITALS
DIASTOLIC BLOOD PRESSURE: 79 MMHG | HEART RATE: 57 BPM | HEIGHT: 60 IN | BODY MASS INDEX: 42.7 KG/M2 | OXYGEN SATURATION: 96 % | WEIGHT: 217.5 LBS | TEMPERATURE: 98.3 F | SYSTOLIC BLOOD PRESSURE: 144 MMHG

## 2022-09-26 DIAGNOSIS — M79.671 PAIN IN BOTH FEET: ICD-10-CM

## 2022-09-26 DIAGNOSIS — G47.00 INSOMNIA, UNSPECIFIED TYPE: ICD-10-CM

## 2022-09-26 DIAGNOSIS — E78.5 HYPERLIPIDEMIA LDL GOAL <130: ICD-10-CM

## 2022-09-26 DIAGNOSIS — I10 ESSENTIAL HYPERTENSION WITH GOAL BLOOD PRESSURE LESS THAN 140/90: ICD-10-CM

## 2022-09-26 DIAGNOSIS — Z00.00 MEDICARE ANNUAL WELLNESS VISIT, SUBSEQUENT: Primary | ICD-10-CM

## 2022-09-26 DIAGNOSIS — M79.672 PAIN IN BOTH FEET: ICD-10-CM

## 2022-09-26 DIAGNOSIS — I10 ELEVATED BLOOD PRESSURE READING WITH DIAGNOSIS OF HYPERTENSION: ICD-10-CM

## 2022-09-26 DIAGNOSIS — M1A.2790 DRUG-INDUCED CHRONIC GOUT OF FOOT WITHOUT TOPHUS, UNSPECIFIED LATERALITY: ICD-10-CM

## 2022-09-26 DIAGNOSIS — I10 ELEVATED BLOOD PRESSURE READING WITH DIAGNOSIS OF HYPERTENSION: Primary | ICD-10-CM

## 2022-09-26 LAB
CRP SERPL-MCNC: <2.9 MG/L (ref 0–8)
ERYTHROCYTE [SEDIMENTATION RATE] IN BLOOD BY WESTERGREN METHOD: 26 MM/HR (ref 0–30)

## 2022-09-26 PROCEDURE — G0439 PPPS, SUBSEQ VISIT: HCPCS | Performed by: FAMILY MEDICINE

## 2022-09-26 PROCEDURE — 85652 RBC SED RATE AUTOMATED: CPT

## 2022-09-26 PROCEDURE — 36415 COLL VENOUS BLD VENIPUNCTURE: CPT

## 2022-09-26 PROCEDURE — 99214 OFFICE O/P EST MOD 30 MIN: CPT | Mod: 25 | Performed by: FAMILY MEDICINE

## 2022-09-26 PROCEDURE — 80053 COMPREHEN METABOLIC PANEL: CPT

## 2022-09-26 PROCEDURE — 80061 LIPID PANEL: CPT

## 2022-09-26 PROCEDURE — 86140 C-REACTIVE PROTEIN: CPT

## 2022-09-26 PROCEDURE — 84550 ASSAY OF BLOOD/URIC ACID: CPT

## 2022-09-26 RX ORDER — ALLOPURINOL 100 MG/1
100 TABLET ORAL DAILY
Qty: 90 TABLET | Refills: 3 | Status: SHIPPED | OUTPATIENT
Start: 2022-09-26 | End: 2023-07-18

## 2022-09-26 RX ORDER — POTASSIUM CHLORIDE 750 MG/1
10 TABLET, EXTENDED RELEASE ORAL DAILY
Qty: 90 TABLET | Refills: 3 | Status: SHIPPED | OUTPATIENT
Start: 2022-09-26 | End: 2023-09-29

## 2022-09-26 RX ORDER — PRAVASTATIN SODIUM 10 MG
10 TABLET ORAL DAILY
Qty: 90 TABLET | Refills: 3 | Status: SHIPPED | OUTPATIENT
Start: 2022-09-26 | End: 2023-09-29

## 2022-09-26 RX ORDER — LOSARTAN POTASSIUM 100 MG/1
100 TABLET ORAL DAILY
Qty: 90 TABLET | Refills: 3 | Status: SHIPPED | OUTPATIENT
Start: 2022-09-26 | End: 2023-09-29

## 2022-09-26 RX ORDER — TRIAMTERENE/HYDROCHLOROTHIAZID 37.5-25 MG
1 TABLET ORAL DAILY
Qty: 90 TABLET | Refills: 3 | Status: SHIPPED | OUTPATIENT
Start: 2022-09-26 | End: 2023-09-29

## 2022-09-26 RX ORDER — METOPROLOL SUCCINATE 50 MG/1
50 TABLET, EXTENDED RELEASE ORAL 2 TIMES DAILY
Qty: 180 TABLET | Refills: 3 | Status: SHIPPED | OUTPATIENT
Start: 2022-09-26 | End: 2023-09-29

## 2022-09-26 ASSESSMENT — PAIN SCALES - GENERAL: PAINLEVEL: NO PAIN (0)

## 2022-09-26 ASSESSMENT — ENCOUNTER SYMPTOMS
PALPITATIONS: 0
SHORTNESS OF BREATH: 0
CONSTIPATION: 1
HEMATURIA: 0
HEMATOCHEZIA: 0
MYALGIAS: 1
CHILLS: 0
DYSURIA: 0
JOINT SWELLING: 1
BREAST MASS: 0
DIZZINESS: 0
HEADACHES: 0
WEAKNESS: 1
FREQUENCY: 0
ABDOMINAL PAIN: 0
PARESTHESIAS: 0
SORE THROAT: 0
FEVER: 0
EYE PAIN: 0
DIARRHEA: 0
NERVOUS/ANXIOUS: 1
HEARTBURN: 0
COUGH: 0
NAUSEA: 0
ARTHRALGIAS: 1

## 2022-09-26 ASSESSMENT — ACTIVITIES OF DAILY LIVING (ADL): CURRENT_FUNCTION: NO ASSISTANCE NEEDED

## 2022-09-26 NOTE — TELEPHONE ENCOUNTER
Dr. Mcclelland -    Pended script for BP cuff - requested by patient    Thank you  Ольга Rodriguez RN  [facility]

## 2022-09-26 NOTE — PROGRESS NOTES
"SUBJECTIVE:   Germania is a 77 year old who presents for Preventive Visit.    Bilateral foot pain. Gabapentin helps the leg pain. She describes the pain as burning on the bottom of her foot. She has intermittent swelling on the top of her feet so she has to wear a bigger size shoe. Because of this, she feels her arches are out of alignment. Wondering about compression socks. It feels the best in the morning. She has stopped taking the nortriptyline.    Hand pain/swelling.  Sometimes her fingers get stuck. Sometimes they are numb/tingling when she talks on the phone. Wondering about gout check.     She stays up for 3-4 hours after taking her Ambien then she sleeps for 8-9 hours. She has a hard time falling asleep. She cannot sleep if she doesn't take it.     Notes she sometimes has elevated BP at home into the 200s systolic. She doubles her metoprolol dose when this happens.     Patient has been advised of split billing requirements and indicates understanding: Yes     Are you in the first 12 months of your Medicare coverage?  No    Healthy Habits:     In general, how would you rate your overall health?  Good    Frequency of exercise:  6-7 days/week    Duration of exercise:  Greater than 60 minutes    Do you usually eat at least 4 servings of fruit and vegetables a day, include whole grains    & fiber and avoid regularly eating high fat or \"junk\" foods?  Yes    Taking medications regularly:  Yes    Medication side effects:  None    Ability to successfully perform activities of daily living:  No assistance needed    Home Safety:  Throw rugs in the hallway    Hearing Impairment:  No hearing concerns    In the past 6 months, have you been bothered by leaking of urine? Yes    In general, how would you rate your overall mental or emotional health?  Good      PHQ-2 Total Score: 0    Additional concerns today:  No    Do you feel safe in your environment? Yes    Have you ever done Advance Care Planning? (For example, a Health " Directive, POLST, or a discussion with a medical provider or your loved ones about your wishes): No, advance care planning information given to patient to review.  Patient plans to discuss their wishes with loved ones or provider.       Fall risk  Fallen 2 or more times in the past year?: No  Any fall with injury in the past year?: No    Cognitive Screening     Do you have sleep apnea, excessive snoring or daytime drowsiness?: Yes- Takes a nap in the afternoon - she is taking Ambian - Snores    Reviewed and updated as needed this visit by clinical staff    Allergies       Soc Hx        Reviewed and updated as needed this visit by Provider                   Social History     Tobacco Use     Smoking status: Never Smoker     Smokeless tobacco: Never Used   Substance Use Topics     Alcohol use: No     If you drink alcohol do you typically have >3 drinks per day or >7 drinks per week? Not applicable    Alcohol Use 9/26/2022   Prescreen: >3 drinks/day or >7 drinks/week? Not Applicable   Prescreen: >3 drinks/day or >7 drinks/week? -   No flowsheet data found.    Patient Care Team:  Pedro Mcclelland MD as PCP - General (Family Practice)  Pedro Mcclelland MD as Assigned PCP    The following health maintenance items are reviewed in Epic and correct as of today:  Health Maintenance   Topic Date Due     DEXA  Never done     URINE DRUG SCREEN  Never done     ZOSTER IMMUNIZATION (1 of 2) Never done     COVID-19 Vaccine (4 - Booster for Pfizer series) 12/03/2021     INFLUENZA VACCINE (1) 09/01/2022     HEPATITIS C SCREENING  03/30/2023 (Originally 3/8/1963)     ANNUAL REVIEW OF HM ORDERS  03/30/2023     MEDICARE ANNUAL WELLNESS VISIT  09/26/2023     FALL RISK ASSESSMENT  09/26/2023     LIPID  06/30/2026     ADVANCE CARE PLANNING  09/26/2027     DTAP/TDAP/TD IMMUNIZATION (3 - Td or Tdap) 06/30/2031     PHQ-2 (once per calendar year)  Completed     Pneumococcal Vaccine: 65+ Years  Completed     IPV IMMUNIZATION  Aged Out  "    MENINGITIS IMMUNIZATION  Aged Out     HEPATITIS B IMMUNIZATION  Aged Out     Review of Systems   Constitutional: Negative for chills and fever.   HENT: Positive for hearing loss. Negative for congestion, ear pain and sore throat.    Eyes: Negative for pain and visual disturbance.   Respiratory: Negative for cough and shortness of breath.    Cardiovascular: Positive for peripheral edema. Negative for chest pain and palpitations.   Gastrointestinal: Positive for constipation. Negative for abdominal pain, diarrhea, heartburn, hematochezia and nausea.   Breasts:  Negative for tenderness, breast mass and discharge.   Genitourinary: Positive for vaginal discharge. Negative for dysuria, frequency, genital sores, hematuria, pelvic pain, urgency and vaginal bleeding.   Musculoskeletal: Positive for arthralgias, joint swelling and myalgias.   Skin: Positive for rash.   Neurological: Positive for weakness. Negative for dizziness, headaches and paresthesias.   Psychiatric/Behavioral: Negative for mood changes. The patient is nervous/anxious.        OBJECTIVE:   BP (!) 144/79 (BP Location: Left arm, Patient Position: Sitting, Cuff Size: Adult Large)   Pulse 57   Temp 98.3  F (36.8  C) (Temporal)   Ht 1.519 m (4' 11.8\")   Wt 98.7 kg (217 lb 8 oz)   SpO2 96%   BMI 42.76 kg/m   Estimated body mass index is 42.76 kg/m  as calculated from the following:    Height as of this encounter: 1.519 m (4' 11.8\").    Weight as of this encounter: 98.7 kg (217 lb 8 oz).  Physical Exam  GENERAL APPEARANCE: healthy, alert and no distress  EYES: Eyes grossly normal to inspection, PERRL and conjunctivae and sclerae normal  HENT: ear canals and TM's normal, nose and mouth without ulcers or lesions, oropharynx clear and oral mucous membranes moist  NECK: no adenopathy, no asymmetry, masses, or scars and thyroid normal to palpation  RESP: lungs clear to auscultation - no rales, rhonchi or wheezes  CV: regular rate and rhythm, normal S1 S2, no " S3 or S4, no murmur, click or rub, no peripheral edema and peripheral pulses strong  ABDOMEN: soft, nontender, no hepatosplenomegaly, no masses and bowel sounds normal  MS: Tinel and Phalen test negative. Arthritic changes to MCP joint bilateral hands. Mild edema of base of left thumb. Upper extremity strength equal bilaterally.   FEET: monofilament testing negative. Mild edema bilateral ankles.   SKIN: no suspicious lesions or rashes   NEURO: Normal strength and tone, sensory exam grossly normal, mentation intact and speech normal  PSYCH: mentation appears normal and affect normal/bright    Labs reviewed in Epic    ASSESSMENT / PLAN:   Kyler was seen today for wellness visit.    Diagnoses and all orders for this visit:    Encounter for long-term (current) use of medications    Hyperlipidemia LDL goal <130  -     Lipid panel reflex to direct LDL Fasting; Future  -     pravastatin (PRAVACHOL) 10 MG tablet; Take 1 tablet (10 mg) by mouth daily    Essential hypertension with goal blood pressure less than 140/90  Elevated in clinic today. Counseled to avoid doubling metoprolol as her rates run lower. Will increase losartan to 100 mg. She will look into purchasing a new home blood pressure cuff.   -     Comprehensive metabolic panel (BMP + Alb, Alk Phos, ALT, AST, Total. 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 Also considering more systemic issue given bilateral hand and knee pain. Patient agreeable to seeing podiatry.  -     Uric acid; Future  -     CRP, inflammation; Future  -     ESR: Erythrocyte sedimentation rate; Future  -     Orthopedic  Referral; Future      Patient has been advised of split billing requirements and indicates understanding: Yes    COUNSELING:  Reviewed preventive health counseling, as reflected in patient instructions  Special attention given to:       Regular exercise       Healthy diet/nutrition    Estimated body mass index is 42.76 kg/m  as calculated from the following:    Height as of this encounter: 1.519 m (4' 11.8\").    Weight as of this encounter: 98.7 kg (217 lb 8 oz).    Weight management plan: Discussed healthy diet and exercise guidelines    She reports that she has never smoked. She has never used smokeless tobacco.      Appropriate preventive services were discussed with this patient, including applicable screening as appropriate for cardiovascular disease, diabetes, osteopenia/osteoporosis, and glaucoma.  As appropriate for age/gender, discussed screening for colorectal cancer, prostate cancer, breast cancer, and cervical cancer. Checklist reviewing preventive services available has been given to the patient.    Reviewed patients plan of care and provided an AVS. The Basic Care Plan (routine screening as documented in Health Maintenance) for Kyler meets the Care Plan requirement. This Care Plan has been established and reviewed with the Patient.    Counseling Resources:  ATP IV Guidelines  Pooled Cohorts Equation Calculator  Breast Cancer Risk Calculator  Breast Cancer: Medication to Reduce Risk  FRAX Risk Assessment  ICSI Preventive Guidelines  Dietary Guidelines for Americans, 2010  [employer]'s MyPlate  ASA Prophylaxis  Lung CA Screening    Pedro Mcclelland MD  [facility]    Identified Health Risks:  "

## 2022-09-26 NOTE — TELEPHONE ENCOUNTER
Patient called requesting an order for a BP cuff to be sent to her pharmacy [facility] in [place]

## 2022-09-28 LAB
ALBUMIN SERPL-MCNC: 3.9 G/DL (ref 3.4–5)
ALP SERPL-CCNC: 113 U/L (ref 40–150)
ALT SERPL W P-5'-P-CCNC: 28 U/L (ref 0–50)
ANION GAP SERPL CALCULATED.3IONS-SCNC: 7 MMOL/L (ref 3–14)
AST SERPL W P-5'-P-CCNC: 27 U/L (ref 0–45)
BILIRUB SERPL-MCNC: 0.7 MG/DL (ref 0.2–1.3)
BUN SERPL-MCNC: 28 MG/DL (ref 7–30)
CALCIUM SERPL-MCNC: 9.9 MG/DL (ref 8.5–10.1)
CHLORIDE BLD-SCNC: 105 MMOL/L (ref 94–109)
CHOLEST SERPL-MCNC: 237 MG/DL
CO2 SERPL-SCNC: 28 MMOL/L (ref 20–32)
CREAT SERPL-MCNC: 0.95 MG/DL (ref 0.52–1.04)
FASTING STATUS PATIENT QL REPORTED: YES
GFR SERPL CREATININE-BSD FRML MDRD: 61 ML/MIN/1.73M2
GLUCOSE BLD-MCNC: 101 MG/DL (ref 70–99)
HDLC SERPL-MCNC: 65 MG/DL
LDLC SERPL CALC-MCNC: 150 MG/DL
NONHDLC SERPL-MCNC: 172 MG/DL
POTASSIUM BLD-SCNC: 4.7 MMOL/L (ref 3.4–5.3)
PROT SERPL-MCNC: 8 G/DL (ref 6.8–8.8)
SODIUM SERPL-SCNC: 140 MMOL/L (ref 133–144)
TRIGL SERPL-MCNC: 112 MG/DL
URATE SERPL-MCNC: 5.7 MG/DL (ref 2.6–6)

## 2022-10-03 ENCOUNTER — TELEPHONE (OUTPATIENT)
Dept: FAMILY MEDICINE | Facility: CLINIC | Age: 77
End: 2022-10-03

## 2022-10-03 NOTE — TELEPHONE ENCOUNTER
Patient calling with questions about blood pressure cuff and podiatry referral.  Advised that she should be able to get BP cuff from any pharmacy.   Patient wondering about seeing a podiatrist- advised she can see them without a referral if she would like them to look at her feet. Patient not sure she wants to see ortho without advise from podiatry first. Transferred to scheduling for an appointment.  Whitley PRESTON RN

## 2022-10-03 NOTE — TELEPHONE ENCOUNTER
Printed and faxed order to [facility] in [place]    Fax: [phone]    Ольга Rodriguez RN  [facility]

## 2022-10-11 ENCOUNTER — ANCILLARY PROCEDURE (OUTPATIENT)
Dept: GENERAL RADIOLOGY | Facility: CLINIC | Age: 77
End: 2022-10-11
Attending: PODIATRIST
Payer: MEDICARE

## 2022-10-11 ENCOUNTER — OFFICE VISIT (OUTPATIENT)
Dept: PODIATRY | Facility: CLINIC | Age: 77
End: 2022-10-11
Payer: MEDICARE

## 2022-10-11 VITALS
WEIGHT: 217.5 LBS | BODY MASS INDEX: 42.7 KG/M2 | HEIGHT: 60 IN | DIASTOLIC BLOOD PRESSURE: 76 MMHG | SYSTOLIC BLOOD PRESSURE: 124 MMHG

## 2022-10-11 DIAGNOSIS — M79.672 BILATERAL FOOT PAIN: ICD-10-CM

## 2022-10-11 DIAGNOSIS — M19.079 ARTHRITIS OF MIDFOOT: ICD-10-CM

## 2022-10-11 DIAGNOSIS — M79.671 BILATERAL FOOT PAIN: ICD-10-CM

## 2022-10-11 DIAGNOSIS — M21.42 PES PLANUS OF BOTH FEET: ICD-10-CM

## 2022-10-11 DIAGNOSIS — M21.41 PES PLANUS OF BOTH FEET: ICD-10-CM

## 2022-10-11 DIAGNOSIS — M79.672 BILATERAL FOOT PAIN: Primary | ICD-10-CM

## 2022-10-11 DIAGNOSIS — M79.671 BILATERAL FOOT PAIN: Primary | ICD-10-CM

## 2022-10-11 PROCEDURE — 99203 OFFICE O/P NEW LOW 30 MIN: CPT | Performed by: PODIATRIST

## 2022-10-11 PROCEDURE — 73630 X-RAY EXAM OF FOOT: CPT | Mod: TC | Performed by: RADIOLOGY

## 2022-10-11 NOTE — PROGRESS NOTES
ASSESSMENT:  Encounter Diagnoses   Name Primary?     Bilateral foot pain Yes     Arthritis of midfoot      Pes planus of both feet      BMI 40.0-44.9, adult (H)      MEDICAL DECISION MAKING:  I personally reviewed the bilateral foot x-rays with Germania.  There are midfoot degenerative changes largely involving second and third tarsometatarsal joints.  There is joint space narrowing dorsally of the right first metatarsocuneiform joint.    Recommendations for midfoot arthritis and pain:  Supportive athletic type shoe was replaced twice yearly  Referral for custom molded multi density orthoses  Avoidance of barefoot walking  The option of image guided steroid injections into the bilateral second and third tarsometatarsal joints.  She would like time to consider this.    She asked about bilateral foot swelling.  On clinical exam, she has bilateral generalized lower extremity edema.  I recommend she discuss this with Dr. Mcclelland.  It likely does not stem from the foot.    Follow-up on an as-needed basis.    Disclaimer: This note consists of symbols derived from keyboarding, dictation and/or voice recognition software. As a result, there may be errors in the script that have gone undetected. Please consider this when interpreting information found in this chart.    Gt Chin DPM, FACFAS, MS    [facility]      ____________________________________________________________________    HPI:       I was asked by Dr. Pedro Mcclelland to evaluate Germania for pain in both feet.  Chief Complaint: Pain all over  Onset of problem: Years  Pain/ discomfort is described as: Burning and aching  Pain Rating:  10/10   Frequency: Daily  The pain is exacerbated by weightbearing activities  Previous treatment: None  She reports dorsal midfoot pain but also has pain on the bottoms of both feet.  She reports having night feet and difficulty purchasing shoes.  She has not tolerated over-the-counter  prefabricated arch supports.  *  Past Medical History:   Diagnosis Date     BMI 40.0-44.9, adult (H) 11/13/2017     Esophageal reflux     Gastroesophageal reflux     Left knee pain      Nonspecific abnormal electrocardiogram (ECG) (EKG) 8/29/2013   *  *  Past Surgical History:   Procedure Laterality Date     ARTHROPLASTY HIP ANTERIOR Right 8/13/2019    Procedure: RIGHT DIRECT ANTERIOR TOTAL HIP ARTHROPLASTY;  Surgeon: Gt Castillo MD;  Location: [facility] OR     CHOLECYSTECTOMY       HERNIORRHAPHY VENTRAL  4/12/2012    Procedure:HERNIORRHAPHY VENTRAL; ventral hernia repair with mesh; Surgeon:ELOISA INMAN; Location:[facility]     HYSTERECTOMY  5/24/01    uterine prolapse/ant. repair   *  *  Current Outpatient Medications   Medication Sig Dispense Refill     allopurinol (ZYLOPRIM) 100 MG tablet Take 1 tablet (100 mg) by mouth daily 90 tablet 3     aspirin (ASA) 325 MG EC tablet Take 325 mg by mouth daily at 2 pm       cyclobenzaprine (FLEXERIL) 10 MG tablet TAKE 1/2 TO 1 TABLET (5-10 MG) BY MOUTH 3 TIMES DAILY AS NEEDED FOR MUSCLE SPASMS 90 tablet 0     FLUZONE HIGH-DOSE QUADRIVALENT 0.7 ML SIOBHAN injection PHARMACY ADMINISTERED       gabapentin (NEURONTIN) 300 MG capsule Take 2 capsules (600 mg) by mouth 2 times daily as needed for neuropathic pain 360 capsule 1     ibuprofen (ADVIL/MOTRIN) 600 MG tablet TAKE 1 TABLET (600 MG) BY MOUTH 2 TIMES DAILY AS NEEDED FOR MODERATE PAIN 60 tablet 3     losartan (COZAAR) 100 MG tablet Take 1 tablet (100 mg) by mouth daily 90 tablet 3     metoprolol succinate ER (TOPROL XL) 50 MG 24 hr tablet Take 1 tablet (50 mg) by mouth 2 times daily 180 tablet 3     multivitamin (CENTRUM SILVER) tablet Take 1 tablet by mouth daily       order for DME Equipment being ordered: Walker Wheels () and Walker ()  Treatment Diagnosis: Impaired gait 1 each 0     oxyCODONE (ROXICODONE) 5 MG tablet Take 1-2 tablets (5-10 mg) by mouth every 4 hours as needed 100 tablet 0     potassium chloride ER  "(KLOR-CON) 10 MEQ CR tablet Take 1 tablet (10 mEq) by mouth daily 90 tablet 3     pravastatin (PRAVACHOL) 10 MG tablet Take 1 tablet (10 mg) by mouth daily 90 tablet 3     senna-docusate (SENOKOT-S/PERICOLACE) 8.6-50 MG tablet Take 1 tablet by mouth 2 times daily 40 tablet 0     triamcinolone (KENALOG) 0.1 % external cream APPLY TOPICALLY DAILY AS NEEDED FOR IRRITATION 30 g 1     triamterene-HCTZ (MAXZIDE-25) 37.5-25 MG tablet Take 1 tablet by mouth daily 90 tablet 3     zolpidem (AMBIEN) 5 MG tablet Take 1 tablet (5 mg) by mouth nightly as needed for sleep 90 tablet 1         EXAM:    Vitals: /72   Ht 1.519 m (4' 11.8\")   Wt 98.7 kg (217 lb 8 oz)   BMI 42.76 kg/m    BMI: Body mass index is 42.76 kg/m .    Constitutional:  Kyler Whitlock is in no apparent distress, appears well-nourished.  Cooperative with history and physical exam.    Vascular:  Pedal pulses are palpable for both the DP and PT arteries.  CFT < 3 sec.  No edema.      Neuro: Light touch sensation is intact to the L4, L5, S1 distributions  No evidence of weakness, spasticity, or contracture in the lower extremities.     Derm: Normal texture and turgor.  No erythema, ecchymosis, or cyanosis.  No open lesions.     Musculoskeletal:    Lower extremity muscle strength is normal. Decrease in medial longitudinal arch with weight bearing.     X-Ray Findings:  I personally reviewed the bilateral foot images.  Please see comments above.        "

## 2022-10-11 NOTE — PATIENT INSTRUCTIONS
Thank you for choosing [facility] Podiatry / Foot & Ankle Surgery!    DR. BLACKBURN'S CLINIC LOCATIONS:     [facility] TRIAGE LINE: [phone]   [address] APPOINTMENTS: [phone]   [place] [address] RADIOLOGY: [phone]   (Every other Tues - Wed - Fri PM) SET UP SURGERY: [phone]    BILLING QUESTIONS: [phone]   [facility] FAX: [phone]   [address]    [place] [address]    (Thurs & Fri AM)      [facility] LOCATIONS  [facility]  [address]  [place] [address]  Phone: [phone]  Fax: [phone] [facility]  [address]  [place] [address]  Phone: [phone]   Fax: [phone]   [facility]  [address]  [place] [address]  Phone: [phone]  Fax: [phone] [facility]  [address]  [place] [address]  Phone: [phone]   Fax: [phone]   [facility]   [address]  [place] [address]  Phone: [phone]  Fax: [phone] * Please call any location listed to make an appointment for a casting/fitting. Your referral was sent to their central office and they will all have the order on file.       OSTEOARTHRITIS OF THE FOOT & ANKLE  Osteoarthritis is a condition characterized by the breakdown and eventual loss of cartilage in one or more joints. Cartilage (the connective tissue found at the end of the bones in the joints) protects and cushions the bones during movement. When cartilage deteriorates or is lost, symptoms develop that can restrict one s ability to easily perform daily activities.  Osteoarthritis is also known as degenerative arthritis, reflecting its nature to develop as part of the aging process. As the most common form of arthritis, osteoarthritis affects millions of Americans. Some people refer to osteoarthritis simply as arthritis, even  though there are many different types of arthritis.  Osteoarthritis appears at various joints throughout the body, including the hands, feet, spine, hips, and knees. In the foot, the disease most frequently occurs in the big toe, although it is also often found in the midfoot and ankle.  CAUSES  Osteoarthritis is considered a  wear and tear  disease because the cartilage in the joint wears down with repeated stress and use over time. As the cartilage deteriorates and gets thinner, the bones lose their protective covering and eventually may rub together, causing pain and inflammation of the joint.  An injury may also lead to osteoarthritis, although it may take months or years after the injury for the condition to develop. For example, osteoarthritis in the big toe is often caused by kicking or jamming the toe, or by dropping something on the toe. Osteoarthritis in the midfoot is often caused by dropping something on it, or by a sprain or fracture. In the ankle, osteoarthritis is usually caused by a fracture and occasionally by a severe sprain.  Sometimes osteoarthritis develops as a result of abnormal foot mechanics such as flat feet or high arches. A flat foot causes less stability in the ligaments (bands of tissue that connect bones), resulting in excessive strain on the joints, which can cause arthritis. A high arch is rigid and lacks mobility, causing a jamming of joints that creates an increased risk of arthritis.  SYMPTOMS  People with osteoarthritis in the foot or ankle experience, in varying degrees, one or more of the following: Pain and stiffness in the joint, swelling in or near the joint, or difficulty walking or bending the joint.   Some patients with osteoarthritis also develop a bone spur (a bony protrusion) at the affected joint. Shoe pressure may cause pain at the site of a bone spur, and in some cases blisters or calluses may form over its surface. Bone spurs can also limit the movement of the  joint.    DIAGNOSIS  In diagnosing osteoarthritis, the foot and ankle surgeon will examine the foot thoroughly, looking for swelling in the joint, limited mobility, and pain with movement. In some cases, deformity and/or enlargement (spur) of the joint may be noted. X-rays may be ordered to evaluate the extent of the disease.  NON-SURGICAL TREATMENT  To help relieve symptoms, the surgeon may begin treating osteoarthritis with one or more of the following non-surgical approaches:  Oral medications. Nonsteroidal anti-inflammatory drugs (NSAIDs), such as ibuprofen, are often helpful in reducing the inflammation and pain. Occasionally a prescription for a steroid medication is needed to adequately reduce symptoms.   Orthotic devices. Custom orthotic devices (shoe inserts) are often prescribed to provide support to improve the foot s mechanics or cushioning to help minimize pain.   Bracing. Bracing, which restricts motion and supports the joint, can reduce pain during walking and help prevent further deformity.   Immobilization. Protecting the foot from movement by wearing a cast or removable cast-boot may be necessary to allow the inflammation to resolve.   Steroid injections. In some cases, steroid injections are applied to the affected joint to deliver anti-inflammatory medication.   Physical therapy. Exercises to strengthen the muscles, especially when the osteoarthritis occurs in the ankle, may give the patient greater stability and help avoid injury that might worsen the condition.   DO I NEED SURGERY?  When osteoarthritis has progressed substantially or failed to improve with non-surgical treatment, surgery may be recommended. In advanced cases, surgery may be the only option. The goal of surgery is to decrease pain and improve function. The foot and ankle surgeon will consider a number of factors when selecting the procedure best suited to the patient s condition and lifestyle.

## 2022-10-11 NOTE — LETTER
10/11/2022         RE: Kyler Whitlock  [address]  [facility] [address]        Dear Colleague,    Thank you for referring your patient, Kyler Whitlock, to the [facility]. Please see a copy of my visit note below.    ASSESSMENT:  Encounter Diagnoses   Name Primary?     Bilateral foot pain Yes     Arthritis of midfoot      Pes planus of both feet      BMI 40.0-44.9, adult (H)      MEDICAL DECISION MAKING:  I personally reviewed the bilateral foot x-rays with Germania.  There are midfoot degenerative changes largely involving second and third tarsometatarsal joints.  There is joint space narrowing dorsally of the right first metatarsocuneiform joint.    Recommendations for midfoot arthritis and pain:  Supportive athletic type shoe was replaced twice yearly  Referral for custom molded multi density orthoses  Avoidance of barefoot walking  The option of image guided steroid injections into the bilateral second and third tarsometatarsal joints.  She would like time to consider this.    She asked about bilateral foot swelling.  On clinical exam, she has bilateral generalized lower extremity edema.  I recommend she discuss this with Dr. Mcclelland.  It likely does not stem from the foot.    Follow-up on an as-needed basis.    Disclaimer: This note consists of symbols derived from keyboarding, dictation and/or voice recognition software. As a result, there may be errors in the script that have gone undetected. Please consider this when interpreting information found in this chart.    Gt Chin DPM, FACFAS, [facility] Department of Podiatry/Foot & Ankle Surgery      ____________________________________________________________________    HPI:       I was asked by Dr. Pedro Mcclelland to evaluate Germania for pain in both feet.  Chief Complaint: Pain all over  Onset of problem: Years  Pain/ discomfort is described as: Burning and aching  Pain Rating:  10/10   Frequency:  Daily  The pain is exacerbated by weightbearing activities  Previous treatment: None  She reports dorsal midfoot pain but also has pain on the bottoms of both feet.  She reports having night feet and difficulty purchasing shoes.  She has not tolerated over-the-counter prefabricated arch supports.  *  Past Medical History:   Diagnosis Date     BMI 40.0-44.9, adult (H) 11/13/2017     Esophageal reflux     Gastroesophageal reflux     Left knee pain      Nonspecific abnormal electrocardiogram (ECG) (EKG) 8/29/2013   *  *  Past Surgical History:   Procedure Laterality Date     ARTHROPLASTY HIP ANTERIOR Right 8/13/2019    Procedure: RIGHT DIRECT ANTERIOR TOTAL HIP ARTHROPLASTY;  Surgeon: Gt Castillo MD;  Location: [facility] OR     CHOLECYSTECTOMY       HERNIORRHAPHY VENTRAL  4/12/2012    Procedure:HERNIORRHAPHY VENTRAL; ventral hernia repair with mesh; Surgeon:ELOISA INMAN; Location:[facility]     HYSTERECTOMY  5/24/01    uterine prolapse/ant. repair   *  *  Current Outpatient Medications   Medication Sig Dispense Refill     allopurinol (ZYLOPRIM) 100 MG tablet Take 1 tablet (100 mg) by mouth daily 90 tablet 3     aspirin (ASA) 325 MG EC tablet Take 325 mg by mouth daily at 2 pm       cyclobenzaprine (FLEXERIL) 10 MG tablet TAKE 1/2 TO 1 TABLET (5-10 MG) BY MOUTH 3 TIMES DAILY AS NEEDED FOR MUSCLE SPASMS 90 tablet 0     FLUZONE HIGH-DOSE QUADRIVALENT 0.7 ML SIOBHAN injection PHARMACY ADMINISTERED       gabapentin (NEURONTIN) 300 MG capsule Take 2 capsules (600 mg) by mouth 2 times daily as needed for neuropathic pain 360 capsule 1     ibuprofen (ADVIL/MOTRIN) 600 MG tablet TAKE 1 TABLET (600 MG) BY MOUTH 2 TIMES DAILY AS NEEDED FOR MODERATE PAIN 60 tablet 3     losartan (COZAAR) 100 MG tablet Take 1 tablet (100 mg) by mouth daily 90 tablet 3     metoprolol succinate ER (TOPROL XL) 50 MG 24 hr tablet Take 1 tablet (50 mg) by mouth 2 times daily 180 tablet 3     multivitamin (CENTRUM SILVER) tablet Take 1 tablet by mouth daily   "     order for DME Equipment being ordered: Walker Wheels () and Walker ()  Treatment Diagnosis: Impaired gait 1 each 0     oxyCODONE (ROXICODONE) 5 MG tablet Take 1-2 tablets (5-10 mg) by mouth every 4 hours as needed 100 tablet 0     potassium chloride ER (KLOR-CON) 10 MEQ CR tablet Take 1 tablet (10 mEq) by mouth daily 90 tablet 3     pravastatin (PRAVACHOL) 10 MG tablet Take 1 tablet (10 mg) by mouth daily 90 tablet 3     senna-docusate (SENOKOT-S/PERICOLACE) 8.6-50 MG tablet Take 1 tablet by mouth 2 times daily 40 tablet 0     triamcinolone (KENALOG) 0.1 % external cream APPLY TOPICALLY DAILY AS NEEDED FOR IRRITATION 30 g 1     triamterene-HCTZ (MAXZIDE-25) 37.5-25 MG tablet Take 1 tablet by mouth daily 90 tablet 3     zolpidem (AMBIEN) 5 MG tablet Take 1 tablet (5 mg) by mouth nightly as needed for sleep 90 tablet 1         EXAM:    Vitals: /72   Ht 1.519 m (4' 11.8\")   Wt 98.7 kg (217 lb 8 oz)   BMI 42.76 kg/m    BMI: Body mass index is 42.76 kg/m .    Constitutional:  Kyler Whitlock is in no apparent distress, appears well-nourished.  Cooperative with history and physical exam.    Vascular:  Pedal pulses are palpable for both the DP and PT arteries.  CFT < 3 sec.  No edema.      Neuro: Light touch sensation is intact to the L4, L5, S1 distributions  No evidence of weakness, spasticity, or contracture in the lower extremities.     Derm: Normal texture and turgor.  No erythema, ecchymosis, or cyanosis.  No open lesions.     Musculoskeletal:    Lower extremity muscle strength is normal. Decrease in medial longitudinal arch with weight bearing.     X-Ray Findings:  I personally reviewed the bilateral foot images.  Please see comments above.            Again, thank you for allowing me to participate in the care of your patient.        Sincerely,        Gt Chin, LEELA    " decreased ability to use arms for pushing/pulling/decreased ability to use legs for bridging/pushing

## 2022-10-18 ENCOUNTER — TELEPHONE (OUTPATIENT)
Dept: FAMILY MEDICINE | Facility: CLINIC | Age: 77
End: 2022-10-18

## 2022-10-18 DIAGNOSIS — J34.89 SORE IN NOSTRIL: Primary | ICD-10-CM

## 2022-10-18 DIAGNOSIS — J31.0 CHRONIC NONALLERGIC RHINITIS: ICD-10-CM

## 2022-10-18 NOTE — TELEPHONE ENCOUNTER
"Calling in requesting referral for ENT due to chronic, recurrent sore at base of nose/in nostril.   States this started last winter and was there all winter long, did go away over the summer. Sore that starts at bottom/base of nose and \"is scabby and scaly\". Read online could be dry skin so tried hydrocortisone and Vaseline  but they did not really help.Was there all winter long. Same nostril. No pus, bleeding, or drainage. Can't see it from the outside.    Routing to PCP to see if pt should be seen in clinic first or maybe derm?    Rosario GALLEGO RN      "

## 2022-11-03 ENCOUNTER — TELEPHONE (OUTPATIENT)
Dept: FAMILY MEDICINE | Facility: CLINIC | Age: 77
End: 2022-11-03

## 2022-11-03 NOTE — TELEPHONE ENCOUNTER
Pt calling asking if it is ok for her to fly being that she is having some discomfort when she puts her hearing aid in. Discussed about increased fluid in the ear and or wax. Pt does not feel she needs to be seen, just wanted to make sure it was safe for her to fly.    Elsa Elder RN  [facility]

## 2023-01-13 DIAGNOSIS — B37.31 CANDIDAL VULVOVAGINITIS: ICD-10-CM

## 2023-01-13 RX ORDER — TRIAMCINOLONE ACETONIDE 1 MG/G
CREAM TOPICAL DAILY PRN
Qty: 30 G | Refills: 1 | Status: SHIPPED | OUTPATIENT
Start: 2023-01-13 | End: 2023-04-11

## 2023-01-13 NOTE — TELEPHONE ENCOUNTER
"Requested Prescriptions   Pending Prescriptions Disp Refills     triamcinolone (KENALOG) 0.1 % external cream [Pharmacy Med Name: TRIAMCINOLONE 0.1% CREAM] 30 g 1     Sig: APPLY TOPICALLY DAILY AS NEEDED FOR IRRITATION       Topical Steroids and Nonsteroidals Protocol Passed - 1/13/2023  3:27 PM        Passed - Patient is age 6 or older        Passed - Authorizing prescriber's most recent note related to this medication read.     If refill request is for ophthalmic use, please forward request to provider for approval.          Passed - High potency steroid not ordered        Passed - Recent (12 mo) or future (30 days) visit within the authorizing provider's specialty     Patient has had an office visit with the authorizing provider or a provider within the authorizing providers department within the previous 12 mos or has a future within next 30 days. See \"Patient Info\" tab in inbasket, or \"Choose Columns\" in Meds & Orders section of the refill encounter.              Passed - Medication is active on med list         Prescription approved per [facility] Refill Protocol.    Ambrocio Nravaez RN   [facility]      "

## 2023-04-10 DIAGNOSIS — B37.31 CANDIDAL VULVOVAGINITIS: ICD-10-CM

## 2023-04-11 RX ORDER — TRIAMCINOLONE ACETONIDE 1 MG/G
CREAM TOPICAL DAILY PRN
Qty: 30 G | Refills: 1 | Status: SHIPPED | OUTPATIENT
Start: 2023-04-11 | End: 2023-10-23

## 2023-04-11 NOTE — TELEPHONE ENCOUNTER
"Requested Prescriptions   Pending Prescriptions Disp Refills     triamcinolone (KENALOG) 0.1 % external cream [Pharmacy Med Name: TRIAMCINOLONE 0.1% CREAM] 30 g 1     Sig: APPLY TOPICALLY DAILY AS NEEDED FOR IRRITATION       Topical Steroids and Nonsteroidals Protocol Passed - 4/10/2023 11:22 PM        Passed - Patient is age 6 or older        Passed - Authorizing prescriber's most recent note related to this medication read.     If refill request is for ophthalmic use, please forward request to provider for approval.          Passed - High potency steroid not ordered        Passed - Recent (12 mo) or future (30 days) visit within the authorizing provider's specialty     Patient has had an office visit with the authorizing provider or a provider within the authorizing providers department within the previous 12 mos or has a future within next 30 days. See \"Patient Info\" tab in inbasket, or \"Choose Columns\" in Meds & Orders section of the refill encounter.              Passed - Medication is active on med list           Prescription approved per [facility] Refill Protocol.    Pt was seen on 09/26/22    Elsa Elder RN  [facility]   "

## 2023-06-22 DIAGNOSIS — M15.9 OSTEOARTHRITIS OF MULTIPLE JOINTS, UNSPECIFIED OSTEOARTHRITIS TYPE: ICD-10-CM

## 2023-06-23 RX ORDER — CYCLOBENZAPRINE HCL 10 MG
TABLET ORAL
Qty: 90 TABLET | Refills: 0 | Status: SHIPPED | OUTPATIENT
Start: 2023-06-23 | End: 2023-09-29

## 2023-09-06 ENCOUNTER — TRANSFERRED RECORDS (OUTPATIENT)
Dept: HEALTH INFORMATION MANAGEMENT | Facility: CLINIC | Age: 78
End: 2023-09-06
Payer: MEDICARE

## 2023-09-29 ENCOUNTER — OFFICE VISIT (OUTPATIENT)
Dept: FAMILY MEDICINE | Facility: CLINIC | Age: 78
End: 2023-09-29
Payer: MEDICARE

## 2023-09-29 VITALS
OXYGEN SATURATION: 98 % | HEIGHT: 60 IN | HEART RATE: 70 BPM | DIASTOLIC BLOOD PRESSURE: 78 MMHG | SYSTOLIC BLOOD PRESSURE: 138 MMHG | RESPIRATION RATE: 11 BRPM | TEMPERATURE: 97.2 F | BODY MASS INDEX: 41.52 KG/M2 | WEIGHT: 211.5 LBS

## 2023-09-29 DIAGNOSIS — M17.0 OSTEOARTHRITIS OF BOTH KNEES, UNSPECIFIED OSTEOARTHRITIS TYPE: ICD-10-CM

## 2023-09-29 DIAGNOSIS — E78.5 HYPERLIPIDEMIA LDL GOAL <130: ICD-10-CM

## 2023-09-29 DIAGNOSIS — R53.83 TIRED: ICD-10-CM

## 2023-09-29 DIAGNOSIS — G47.00 INSOMNIA, UNSPECIFIED TYPE: ICD-10-CM

## 2023-09-29 DIAGNOSIS — M10.00 IDIOPATHIC GOUT, UNSPECIFIED CHRONICITY, UNSPECIFIED SITE: ICD-10-CM

## 2023-09-29 DIAGNOSIS — Z97.4 WEARS HEARING AID IN BOTH EARS: ICD-10-CM

## 2023-09-29 DIAGNOSIS — I89.0 LYMPHEDEMA OF BOTH LOWER EXTREMITIES: ICD-10-CM

## 2023-09-29 DIAGNOSIS — I10 ESSENTIAL HYPERTENSION WITH GOAL BLOOD PRESSURE LESS THAN 140/90: ICD-10-CM

## 2023-09-29 DIAGNOSIS — Z00.00 ENCOUNTER FOR PREVENTATIVE ADULT HEALTH CARE EXAMINATION: Primary | ICD-10-CM

## 2023-09-29 LAB
ALBUMIN SERPL BCG-MCNC: 4.2 G/DL (ref 3.5–5.2)
ALP SERPL-CCNC: 79 U/L (ref 35–104)
ALT SERPL W P-5'-P-CCNC: 18 U/L (ref 0–50)
ANION GAP SERPL CALCULATED.3IONS-SCNC: 15 MMOL/L (ref 7–15)
AST SERPL W P-5'-P-CCNC: 35 U/L (ref 0–45)
BILIRUB SERPL-MCNC: 0.5 MG/DL
BUN SERPL-MCNC: 24.6 MG/DL (ref 8–23)
CALCIUM SERPL-MCNC: 10.1 MG/DL (ref 8.8–10.2)
CHLORIDE SERPL-SCNC: 98 MMOL/L (ref 98–107)
CHOLEST SERPL-MCNC: 218 MG/DL
CREAT SERPL-MCNC: 1.09 MG/DL (ref 0.51–0.95)
DEPRECATED HCO3 PLAS-SCNC: 25 MMOL/L (ref 22–29)
EGFRCR SERPLBLD CKD-EPI 2021: 51 ML/MIN/1.73M2
ERYTHROCYTE [DISTWIDTH] IN BLOOD BY AUTOMATED COUNT: 13.2 % (ref 10–15)
GLUCOSE SERPL-MCNC: 90 MG/DL (ref 70–99)
HCT VFR BLD AUTO: 39.9 % (ref 35–47)
HDLC SERPL-MCNC: 60 MG/DL
HGB BLD-MCNC: 12.8 G/DL (ref 11.7–15.7)
LDLC SERPL CALC-MCNC: 144 MG/DL
MCH RBC QN AUTO: 29.3 PG (ref 26.5–33)
MCHC RBC AUTO-ENTMCNC: 32.1 G/DL (ref 31.5–36.5)
MCV RBC AUTO: 91 FL (ref 78–100)
NONHDLC SERPL-MCNC: 158 MG/DL
PLATELET # BLD AUTO: 210 10E3/UL (ref 150–450)
POTASSIUM SERPL-SCNC: 4.5 MMOL/L (ref 3.4–5.3)
PROT SERPL-MCNC: 7.3 G/DL (ref 6.4–8.3)
RBC # BLD AUTO: 4.37 10E6/UL (ref 3.8–5.2)
SODIUM SERPL-SCNC: 138 MMOL/L (ref 135–145)
TRIGL SERPL-MCNC: 70 MG/DL
TSH SERPL DL<=0.005 MIU/L-ACNC: 2.3 UIU/ML (ref 0.3–4.2)
URATE SERPL-MCNC: 5.9 MG/DL (ref 2.4–5.7)
WBC # BLD AUTO: 5.2 10E3/UL (ref 4–11)

## 2023-09-29 PROCEDURE — G0439 PPPS, SUBSEQ VISIT: HCPCS | Performed by: FAMILY MEDICINE

## 2023-09-29 PROCEDURE — 80061 LIPID PANEL: CPT | Performed by: FAMILY MEDICINE

## 2023-09-29 PROCEDURE — 85027 COMPLETE CBC AUTOMATED: CPT | Performed by: FAMILY MEDICINE

## 2023-09-29 PROCEDURE — 84550 ASSAY OF BLOOD/URIC ACID: CPT | Performed by: FAMILY MEDICINE

## 2023-09-29 PROCEDURE — 84443 ASSAY THYROID STIM HORMONE: CPT | Performed by: FAMILY MEDICINE

## 2023-09-29 PROCEDURE — 99214 OFFICE O/P EST MOD 30 MIN: CPT | Mod: 25 | Performed by: FAMILY MEDICINE

## 2023-09-29 PROCEDURE — 80053 COMPREHEN METABOLIC PANEL: CPT | Performed by: FAMILY MEDICINE

## 2023-09-29 PROCEDURE — 36415 COLL VENOUS BLD VENIPUNCTURE: CPT | Performed by: FAMILY MEDICINE

## 2023-09-29 RX ORDER — ZOLPIDEM TARTRATE 5 MG/1
5 TABLET ORAL
Qty: 90 TABLET | Refills: 1 | Status: SHIPPED | OUTPATIENT
Start: 2023-09-29 | End: 2024-04-12

## 2023-09-29 RX ORDER — LOSARTAN POTASSIUM 100 MG/1
100 TABLET ORAL DAILY
Qty: 90 TABLET | Refills: 3 | Status: SHIPPED | OUTPATIENT
Start: 2023-09-29

## 2023-09-29 RX ORDER — ALLOPURINOL 100 MG/1
100 TABLET ORAL DAILY
Qty: 90 TABLET | Refills: 3 | Status: SHIPPED | OUTPATIENT
Start: 2023-09-29 | End: 2023-12-09

## 2023-09-29 RX ORDER — IBUPROFEN 600 MG/1
600 TABLET, FILM COATED ORAL 2 TIMES DAILY PRN
Qty: 60 TABLET | Refills: 5 | Status: SHIPPED | OUTPATIENT
Start: 2023-09-29 | End: 2023-11-07 | Stop reason: SINTOL

## 2023-09-29 RX ORDER — CYCLOBENZAPRINE HCL 10 MG
TABLET ORAL
Qty: 90 TABLET | Refills: 3 | Status: SHIPPED | OUTPATIENT
Start: 2023-09-29

## 2023-09-29 RX ORDER — PRAVASTATIN SODIUM 10 MG
10 TABLET ORAL DAILY
Qty: 90 TABLET | Refills: 3 | Status: SHIPPED | OUTPATIENT
Start: 2023-09-29 | End: 2023-11-07 | Stop reason: DRUGHIGH

## 2023-09-29 RX ORDER — TRIAMTERENE/HYDROCHLOROTHIAZID 37.5-25 MG
1 TABLET ORAL DAILY
Qty: 90 TABLET | Refills: 3 | Status: SHIPPED | OUTPATIENT
Start: 2023-09-29 | End: 2023-11-07

## 2023-09-29 RX ORDER — POTASSIUM CHLORIDE 750 MG/1
10 TABLET, EXTENDED RELEASE ORAL DAILY
Qty: 90 TABLET | Refills: 3 | Status: SHIPPED | OUTPATIENT
Start: 2023-09-29 | End: 2024-07-08

## 2023-09-29 RX ORDER — METOPROLOL SUCCINATE 50 MG/1
50 TABLET, EXTENDED RELEASE ORAL 2 TIMES DAILY
Qty: 180 TABLET | Refills: 3 | Status: SHIPPED | OUTPATIENT
Start: 2023-09-29 | End: 2024-10-04

## 2023-09-29 RX ORDER — GABAPENTIN 300 MG/1
600 CAPSULE ORAL 2 TIMES DAILY PRN
Qty: 360 CAPSULE | Refills: 3 | Status: SHIPPED | OUTPATIENT
Start: 2023-09-29

## 2023-09-29 ASSESSMENT — ENCOUNTER SYMPTOMS
CHILLS: 0
ABDOMINAL PAIN: 0
PARESTHESIAS: 0
FEVER: 0
WEAKNESS: 0
DIARRHEA: 0
ARTHRALGIAS: 1
CONSTIPATION: 1
NAUSEA: 0
FREQUENCY: 0
SHORTNESS OF BREATH: 0
BREAST MASS: 0
DIZZINESS: 1
HEMATURIA: 0
PALPITATIONS: 0
HEADACHES: 0
HEMATOCHEZIA: 0
NERVOUS/ANXIOUS: 0
MYALGIAS: 0
JOINT SWELLING: 1
HEARTBURN: 0
EYE PAIN: 0
DYSURIA: 0
COUGH: 0
SORE THROAT: 0

## 2023-09-29 ASSESSMENT — PAIN SCALES - GENERAL: PAINLEVEL: NO PAIN (0)

## 2023-09-29 ASSESSMENT — ACTIVITIES OF DAILY LIVING (ADL): CURRENT_FUNCTION: NO ASSISTANCE NEEDED

## 2023-09-29 NOTE — PROGRESS NOTES
"SUBJECTIVE:   Germania is a 78 year old who presents for Preventive Visit.        9/29/2023    10:55 AM   Additional Questions   Roomed by Adina Vasquez         9/29/2023    11:07 AM   Patient Reported Additional Medications   Patient reports taking the following new medications flaxseed oil 1000 mg 500mg omega 3, Vitamin C gummies 250 mg, Turmeric 1500 mg, B complex, Magnesium citrate 200mg, D3 2000 IU, Aspercream Arthritis lidocaine   Pt states that she doesn't know how to use her blood pressure monitor at home    Are you in the first 12 months of your Medicare coverage?  No    Healthy Habits:     In general, how would you rate your overall health?  Good    Frequency of exercise:  6-7 days/week    Duration of exercise:  45-60 minutes    Do you usually eat at least 4 servings of fruit and vegetables a day, include whole grains    & fiber and avoid regularly eating high fat or \"junk\" foods?  Yes    Taking medications regularly:  Yes    Medication side effects:  None    Ability to successfully perform activities of daily living:  No assistance needed    Home Safety:  Throw rugs in the hallway    Hearing Impairment:  Difficulty following a conversation in a noisy restaurant or crowded room, feel that people are mumbling or not speaking clearly, difficulty following dialogue in the theater, need to ask people to speak up or repeat themselves, difficulty understanding soft or whispered speech and difficulty understanding speech on the telephone    In the past 6 months, have you been bothered by leaking of urine? Yes    In general, how would you rate your overall mental or emotional health?  Good    Additional concerns today:  No      Today's PHQ-2 Score:       9/29/2023    11:03 AM   PHQ-2 ( 1999 [employer])   Q1: Little interest or pleasure in doing things 0   Q2: Feeling down, depressed or hopeless 0   PHQ-2 Score 0   Q1: Little interest or pleasure in doing things Not at all   Q2: Feeling down, depressed or hopeless Not at " all   PHQ-2 Score 0           Have you ever done Advance Care Planning? (For example, a Health Directive, POLST, or a discussion with a medical provider or your loved ones about your wishes): No, advance care planning information given to patient to review.  Patient plans to discuss their wishes with loved ones or provider.       Fall risk  Fallen 2 or more times in the past year?: No  Any fall with injury in the past year?: No    Cognitive Screening   1) Repeat 3 items (Leader, Season, Table)    2) Clock draw: NORMAL  3) 3 item recall: Recalls 3 objects  Results: 3 items recalled: COGNITIVE IMPAIRMENT LESS LIKELY    Mini-CogTM Copyright S Ingrid. Licensed by the author for use in [facility]; reprinted with permission (oliver@[facility]). All rights reserved.      Do you have sleep apnea, excessive snoring or daytime drowsiness? : yes    Reviewed and updated as needed this visit by clinical staff   Tobacco  Allergies  Meds              Reviewed and updated as needed this visit by Provider                 Social History     Tobacco Use    Smoking status: Never    Smokeless tobacco: Never   Substance Use Topics    Alcohol use: No         9/29/2023    11:03 AM   Alcohol Use   Prescreen: >3 drinks/day or >7 drinks/week? Not Applicable          No data to display              Do you have a current opioid prescription? No  Do you use any other controlled substances or medications that are not prescribed by a provider? None    Osteoarthritis Knees-occasionally left knee pain will cause it to give out.  Orthopedist says patient needs to lose pounds before having surgery.  She has lost 15 of the 30.  Right knee pain is just as bad but does not give out.  Right-handed, uses cane in that hand.  Takes ibuprofen 600 mg twice daily, not sure if it helps.    Bilateral hearing aids-just got, thinks they are helping    Gout-takes allopurinol 100 mg daily with normal uric acid's for some years    Insomnia-zolpidem still  seems to help    Hyperlipidemia Follow-Up    Are you regularly taking any medication or supplement to lower your cholesterol?   Yes- pravastatin  Are you having muscle aches or other side effects that you think could be caused by your cholesterol lowering medication?  No    Hypertension Follow-up    Do you check your blood pressure regularly outside of the clinic? No   Are you following a low salt diet? No  Are your blood pressures ever more than 140 on the top number (systolic) OR more   than 90 on the bottom number (diastolic), for example 140/90? No    GriefFollowup  How are you doing with your grief since your last visit? Worsened, especially when family leaves.  Spouse  around 20 years ago.  Are you having other symptoms that might be associated? Yes:  Insomnia  Have you had a significant life event?  Grief or Loss and Health Concerns   Are you feeling anxious or having panic attacks?   No  Do you have any concerns with your use of alcohol or other drugs? No    Social History     Tobacco Use    Smoking status: Never    Smokeless tobacco: Never   Vaping Use    Vaping Use: Never used   Substance Use Topics    Alcohol use: No    Drug use: No         2016    10:34 AM 2018    11:15 AM   PHQ   PHQ-9 Total Score 7 8   Q9: Thoughts of better off dead/self-harm past 2 weeks Not at all Not at all         2016    10:34 AM   MARIOLA-7 SCORE   Total Score 1     Current providers sharing in care for this patient include:   Patient Care Team:  Pedro Mcclelland MD as PCP - General (Family Practice)  Pedro Mcclelland MD as Assigned PCP  Gt Chin DPM as Assigned Surgical Provider    The following health maintenance items are reviewed in Epic and correct as of today:  Health Maintenance   Topic Date Due    DEXA  Never done    URINE DRUG SCREEN  Never done    ZOSTER IMMUNIZATION (1 of 2) Never done    INFLUENZA VACCINE (1) 2023    COVID-19 Vaccine (2023- season) 2023     "MEDICARE ANNUAL WELLNESS VISIT  09/29/2024    ANNUAL REVIEW OF HM ORDERS  09/29/2024    FALL RISK ASSESSMENT  09/29/2024    LIPID  09/29/2028    ADVANCE CARE PLANNING  09/29/2028    DTAP/TDAP/TD IMMUNIZATION (2 - Td or Tdap) 06/30/2031    PHQ-2 (once per calendar year)  Completed    Pneumococcal Vaccine: 65+ Years  Completed    IPV IMMUNIZATION  Aged Out    HPV IMMUNIZATION  Aged Out    MENINGITIS IMMUNIZATION  Aged Out     Lab work is in process  Labs reviewed in EPIC    Mammogram Screening - Patient over age 75, has elected to discontinue screenings.  Pertinent mammograms are reviewed under the imaging tab.    Review of Systems   Constitutional:  Negative for chills and fever.   HENT:  Positive for hearing loss. Negative for congestion, ear pain and sore throat.    Eyes:  Positive for visual disturbance. Negative for pain.   Respiratory:  Negative for cough and shortness of breath.    Cardiovascular:  Positive for peripheral edema. Negative for chest pain and palpitations.   Gastrointestinal:  Positive for constipation. Negative for abdominal pain, diarrhea, heartburn, hematochezia and nausea.   Breasts:  Positive for tenderness. Negative for breast mass and discharge.   Genitourinary:  Positive for genital sores. Negative for dysuria, frequency, hematuria, pelvic pain, urgency, vaginal bleeding and vaginal discharge.   Musculoskeletal:  Positive for arthralgias and joint swelling. Negative for myalgias.   Skin:  Negative for rash.   Neurological:  Positive for dizziness. Negative for weakness, headaches and paresthesias.   Psychiatric/Behavioral:  Negative for mood changes. The patient is not nervous/anxious.      OBJECTIVE:   /78   Pulse 70   Temp 97.2  F (36.2  C) (Temporal)   Resp 11   Ht 1.525 m (5' 0.04\")   Wt 95.9 kg (211 lb 8 oz)   SpO2 98%   BMI 41.25 kg/m   Estimated body mass index is 41.25 kg/m  as calculated from the following:    Height as of this encounter: 1.525 m (5' 0.04\").    Weight " as of this encounter: 95.9 kg (211 lb 8 oz).  Physical Exam  GENERAL APPEARANCE: morbidly obese, elderly, and fatigued  EYES: Eyes grossly normal to inspection, PERRL and conjunctivae and sclerae normal  HENT: ear canals and TM's normal, nose and mouth without ulcers or lesions, oropharynx clear and oral mucous membranes moist  NECK: no adenopathy, no asymmetry, masses, or scars and thyroid normal to palpation  RESP: lungs clear to auscultation - no rales, rhonchi or wheezes  CV: regular rate and rhythm, normal S1 S2, no S3 or S4, no murmur, click or rub, no peripheral edema and peripheral pulses strong  ABDOMEN: soft, nontender, no hepatosplenomegaly, no masses and bowel sounds normal  MS: RUE exam reveals nonpitting swelling, LUE exam reveals same  SKIN: no suspicious lesions or rashes  NEURO: Normal strength and tone, sensory exam grossly normal, mentation intact and speech normal  PSYCH: mentation appears normal and affect pleasant, flat    Diagnostic Test Results:  Labs reviewed in Epic    ASSESSMENT / PLAN:       ICD-10-CM    1. Encounter for preventative adult health care examination  Z00.00       2. Tired  R53.83 Comprehensive metabolic panel (BMP + Alb, Alk Phos, ALT, AST, Total. Bili, TP)     CBC with platelets     TSH with free T4 reflex     Comprehensive metabolic panel (BMP + Alb, Alk Phos, ALT, AST, Total. Bili, TP)     CBC with platelets     TSH with free T4 reflex      3. Insomnia, unspecified type  G47.00 zolpidem (AMBIEN) 5 MG tablet      4. Osteoarthritis of both knees, unspecified osteoarthritis type  M17.0 ibuprofen (ADVIL/MOTRIN) 600 MG tablet      5. Lymphedema of both lower extremities  I89.0       6. BMI 40.0-44.9, adult (H)  Z68.41       7. Essential hypertension with goal blood pressure less than 140/90  I10 potassium chloride ER (KLOR-CON) 10 MEQ CR tablet      8. Hyperlipidemia LDL goal <130  E78.5 Lipid panel reflex to direct LDL Fasting     pravastatin (PRAVACHOL) 10 MG tablet      "Lipid panel reflex to direct LDL Fasting      9. Idiopathic gout, unspecified chronicity, unspecified site  M10.00 Uric acid     Uric acid      10. Wears hearing aid in both ears  Z97.4           COUNSELING:  Reviewed preventive health counseling, as reflected in patient instructions       Regular exercise       Healthy diet/nutrition       Vision screening      BMI:   Estimated body mass index is 41.25 kg/m  as calculated from the following:    Height as of this encounter: 1.525 m (5' 0.04\").    Weight as of this encounter: 95.9 kg (211 lb 8 oz).   Weight management plan: Discussed healthy diet and exercise guidelines      She reports that she has never smoked. She has never used smokeless tobacco.      Appropriate preventive services were discussed with this patient, including applicable screening as appropriate for fall prevention, nutrition, physical activity, Tobacco-use cessation, weight loss and cognition.  Checklist reviewing preventive services available has been given to the patient.    Reviewed patients plan of care and provided an AVS. The Basic Care Plan (routine screening as documented in Health Maintenance) for Kyler meets the Care Plan requirement. This Care Plan has been established and reviewed with the Patient.  Patient Instructions   Mail copy of labs (no MyChart)    Pedro Mcclelland MD  [facility]    Identified Health Risks:  I have reviewed Opioid Use Disorder and Substance Use Disorder risk factors and made any needed referrals.   "

## 2023-10-01 NOTE — RESULT ENCOUNTER NOTE
Triage- notify patient:     Please send patient letter and copy of labs:    Normal CBC and thyroid  Uric acid slightly elevated, kidney function slightly decreased, LDL 'bad' cholesterol better, but not at goal <130.     Recommendations-  1. Stop taking ibuprofen regularly twice daily, as it can be toxic to you kidneys. Schedule an appointment to see me in 2 months to recheck kidney blood and urine tests, take extra strength acetaminophen 500 mg two doses 3-4 x daily (not to exceed 4000 mg/24 hrs) OK to take a rare ibuprofen dose if needed for bad pain.  2. Cut blood pressure/diuretic medication hydrochlorothiazide/triamterene in half, 1/2 once daily. This along with allopurinol, will help reduced elevated uric acid. Please check home blood pressure/pulse readings, and call me with them, goal <140/90  3. Please pravastatin from 10 to 20 mg daily.    Contact if questions - continue good job with cutting calories/losing weight.    Thanks Pedro RAE

## 2023-10-20 ENCOUNTER — TELEPHONE (OUTPATIENT)
Dept: FAMILY MEDICINE | Facility: CLINIC | Age: 78
End: 2023-10-20
Payer: MEDICARE

## 2023-10-20 NOTE — TELEPHONE ENCOUNTER
Pt calling stating that she has she does not believe that she has been taking the pravastatin. Pt was told that she has been taking this medication according to our records since 2021. Pt states that it was the pharmacy is saying as well and told her she has been picking them up. The pt is saying that she does not have record of taking the medication. She is questioning if she should be taking 10mg (which is currently ordered) or 20 mg (unable to see a 20 mg ordered)? Worried to take 20 mg if she has not been taking the 10 mg?    Please advise, thanks     Elsa Elder RN  [facility]

## 2023-10-23 DIAGNOSIS — B37.31 CANDIDAL VULVOVAGINITIS: ICD-10-CM

## 2023-10-23 RX ORDER — TRIAMCINOLONE ACETONIDE 1 MG/G
CREAM TOPICAL DAILY PRN
Qty: 30 G | Refills: 1 | Status: SHIPPED | OUTPATIENT
Start: 2023-10-23 | End: 2023-12-04

## 2023-10-23 NOTE — TELEPHONE ENCOUNTER
LVM for pt to call to clarify medication dosing.   Please advise that pt should be taking 10mg of pravastatin.   Thanks,   Karl Perry RN  [facility]

## 2023-10-24 NOTE — TELEPHONE ENCOUNTER
Called pt and relayed message. Pt stated that she will only be taking the 10 mg.     Elsa Elder RN  [facility]

## 2023-11-07 ENCOUNTER — TELEPHONE (OUTPATIENT)
Dept: FAMILY MEDICINE | Facility: CLINIC | Age: 78
End: 2023-11-07
Payer: MEDICARE

## 2023-11-07 DIAGNOSIS — M10.00 IDIOPATHIC GOUT, UNSPECIFIED CHRONICITY, UNSPECIFIED SITE: Primary | ICD-10-CM

## 2023-11-07 DIAGNOSIS — E78.5 HYPERLIPIDEMIA LDL GOAL <130: ICD-10-CM

## 2023-11-07 DIAGNOSIS — R79.89 ELEVATED SERUM CREATININE: Primary | ICD-10-CM

## 2023-11-07 DIAGNOSIS — I10 ESSENTIAL HYPERTENSION WITH GOAL BLOOD PRESSURE LESS THAN 140/90: ICD-10-CM

## 2023-11-07 RX ORDER — TRIAMTERENE/HYDROCHLOROTHIAZID 37.5-25 MG
0.5 TABLET ORAL DAILY
Start: 2023-11-07

## 2023-11-29 ENCOUNTER — LAB (OUTPATIENT)
Dept: LAB | Facility: CLINIC | Age: 78
End: 2023-11-29
Payer: MEDICARE

## 2023-11-29 DIAGNOSIS — M10.00 IDIOPATHIC GOUT, UNSPECIFIED CHRONICITY, UNSPECIFIED SITE: ICD-10-CM

## 2023-11-29 DIAGNOSIS — E78.5 HYPERLIPIDEMIA LDL GOAL <130: ICD-10-CM

## 2023-11-29 DIAGNOSIS — R79.89 ELEVATED SERUM CREATININE: ICD-10-CM

## 2023-11-29 LAB
ANION GAP SERPL CALCULATED.3IONS-SCNC: 12 MMOL/L (ref 7–15)
BUN SERPL-MCNC: 24.2 MG/DL (ref 8–23)
CALCIUM SERPL-MCNC: 10.7 MG/DL (ref 8.8–10.2)
CHLORIDE SERPL-SCNC: 98 MMOL/L (ref 98–107)
CREAT SERPL-MCNC: 1.04 MG/DL (ref 0.51–0.95)
DEPRECATED HCO3 PLAS-SCNC: 29 MMOL/L (ref 22–29)
EGFRCR SERPLBLD CKD-EPI 2021: 54 ML/MIN/1.73M2
GLUCOSE SERPL-MCNC: 91 MG/DL (ref 70–99)
LDLC SERPL DIRECT ASSAY-MCNC: 122 MG/DL
POTASSIUM SERPL-SCNC: 5.5 MMOL/L (ref 3.4–5.3)
SODIUM SERPL-SCNC: 139 MMOL/L (ref 135–145)
URATE SERPL-MCNC: 6.4 MG/DL (ref 2.4–5.7)

## 2023-11-29 PROCEDURE — 83721 ASSAY OF BLOOD LIPOPROTEIN: CPT

## 2023-11-29 PROCEDURE — 84550 ASSAY OF BLOOD/URIC ACID: CPT

## 2023-11-29 PROCEDURE — 36415 COLL VENOUS BLD VENIPUNCTURE: CPT

## 2023-11-29 PROCEDURE — 80048 BASIC METABOLIC PNL TOTAL CA: CPT

## 2023-12-04 ENCOUNTER — OFFICE VISIT (OUTPATIENT)
Dept: FAMILY MEDICINE | Facility: CLINIC | Age: 78
End: 2023-12-04
Payer: MEDICARE

## 2023-12-04 VITALS
HEIGHT: 60 IN | WEIGHT: 211.42 LBS | TEMPERATURE: 97.3 F | BODY MASS INDEX: 41.51 KG/M2 | DIASTOLIC BLOOD PRESSURE: 77 MMHG | OXYGEN SATURATION: 95 % | RESPIRATION RATE: 15 BRPM | SYSTOLIC BLOOD PRESSURE: 127 MMHG | HEART RATE: 53 BPM

## 2023-12-04 DIAGNOSIS — M17.12 PRIMARY OSTEOARTHRITIS OF LEFT KNEE: ICD-10-CM

## 2023-12-04 DIAGNOSIS — N18.31 STAGE 3A CHRONIC KIDNEY DISEASE (H): Primary | ICD-10-CM

## 2023-12-04 DIAGNOSIS — N95.2 ATROPHIC VAGINITIS: ICD-10-CM

## 2023-12-04 PROBLEM — N18.2 CHRONIC KIDNEY DISEASE, STAGE 2 (MILD): Status: ACTIVE | Noted: 2023-12-04

## 2023-12-04 LAB
CREAT UR-MCNC: 102 MG/DL
MICROALBUMIN UR-MCNC: 22.7 MG/L
MICROALBUMIN/CREAT UR: 22.25 MG/G CR (ref 0–25)

## 2023-12-04 PROCEDURE — 82043 UR ALBUMIN QUANTITATIVE: CPT | Performed by: FAMILY MEDICINE

## 2023-12-04 PROCEDURE — 99214 OFFICE O/P EST MOD 30 MIN: CPT | Performed by: FAMILY MEDICINE

## 2023-12-04 PROCEDURE — 82570 ASSAY OF URINE CREATININE: CPT | Performed by: FAMILY MEDICINE

## 2023-12-04 RX ORDER — TRIAMCINOLONE ACETONIDE 1 MG/G
CREAM TOPICAL DAILY PRN
Qty: 30 G | Refills: 5 | Status: SHIPPED | OUTPATIENT
Start: 2023-12-04

## 2023-12-04 ASSESSMENT — PAIN SCALES - GENERAL: PAINLEVEL: NO PAIN (0)

## 2023-12-04 NOTE — PROGRESS NOTES
Assessment & Plan     Stage 3a chronic kidney disease (H)  Confirmed past 2 years   - Albumin Random Urine Quantitative with Creat Ratio; Future  - Albumin Random Urine Quantitative with Creat Ratio    Atrophic vaginitis  Needs refill  - triamcinolone (KENALOG) 0.1 % external cream; Apply topically daily as needed for irritation    BMI 40.0-44.9, adult (H)  Needs to lose 30# to have knee replacement  - Semaglutide-Weight Management (WEGOVY) 0.25 MG/0.5ML pen; Inject 0.25 mg Subcutaneous once a week    Review of external notes as documented elsewhere in note  Ordering of each unique test  Prescription drug management  30 minutes spent by me on the date of the encounter doing chart review, history and exam, documentation and further activities per the note     Patient Instructions   Will contact with labs    Continue current meds     Check cost of semaglutide; fillable 1 month paper PRESCRIPTION given.        Pedro Mcclelland MD  [facility]    Paulo Solo is a 78 year old, presenting for the following health issues:  Results      12/4/2023     9:25 AM   Additional Questions   Roomed by Adina Vasquez         12/4/2023     9:32 AM   Patient Reported Additional Medications   Patient reports taking the following new medications Vitamin C 250 mg, turmeric curcumin 1,500 mg, B complex, Magnesium citrate 200 mg, D3 2000 IU       History of Present Illness       Reason for visit:  Lab results    She eats 2-3 servings of fruits and vegetables daily.She consumes 0 sweetened beverage(s) daily.She exercises with enough effort to increase her heart rate 30 to 60 minutes per day.  She exercises with enough effort to increase her heart rate 5 days per week.   She is taking medications regularly.     Chronic Kidney Disease Follow-up    Do you take any over the counter pain medicine?: Yes  What over the counter medicine are you taking for your pain?:  acetaminophen didn't help    How often do you  "take this medicine?:  A few times a month    Left Knee OSTEOARTHRITIS- TKA after losing 30#    Review of Systems   Constitutional, HEENT, cardiovascular, pulmonary, gi and gu systems are negative, except as otherwise noted.      Objective    /77   Pulse 53   Temp 97.3  F (36.3  C) (Temporal)   Resp 15   Ht 1.525 m (5' 0.04\")   Wt 95.9 kg (211 lb 6.7 oz)   SpO2 95%   BMI 41.24 kg/m    Body mass index is 41.24 kg/m .  Physical Exam   GENERAL: mild distress, obese, frail, and elderly  RESP: lungs clear to auscultation - no rales, rhonchi or wheezes  CV: regular rate and rhythm, normal S1 S2, no S3 or S4, no murmur, click or rub, no peripherasadl edema and peripheral pulses strong  MS: LLE exam shows edema to knees, normal ROM  PSYCH: mentation appears normal, judgement and insight intact              "

## 2023-12-05 NOTE — PATIENT INSTRUCTIONS
Will contact with labs    Continue current meds     Check cost of semaglutide; fillable 1 month paper PRESCRIPTION given.

## 2023-12-09 DIAGNOSIS — M10.00 IDIOPATHIC GOUT, UNSPECIFIED CHRONICITY, UNSPECIFIED SITE: Primary | ICD-10-CM

## 2023-12-09 RX ORDER — ALLOPURINOL 100 MG/1
200 TABLET ORAL DAILY
Qty: 180 TABLET | Refills: 3 | Status: SHIPPED | OUTPATIENT
Start: 2023-12-09

## 2023-12-09 NOTE — RESULT ENCOUNTER NOTE
Please notify patient:   LDL 'bad' cholesterol is not at goal <100. Recommend reduced meat, eggs, dairy fats. Recheck in 1 year. Please send copy of labs to patient.    Thanks Pedro Mcclelland MD

## 2023-12-23 NOTE — TELEPHONE ENCOUNTER
Requested Prescriptions   Pending Prescriptions Disp Refills     gabapentin (NEURONTIN) 300 MG capsule [Pharmacy Med Name: GABAPENTIN 300 MG CAPSULE] 360 capsule 1     Sig: TAKE 1 CAPSULE (300 MG) BY MOUTH 4 TIMES DAILY AS NEEDED       There is no refill protocol information for this order              Last Written Prescription Date:  12/5/18  Last Fill Quantity: 30,   # refills: 1  Last Office Visit: 12/12/18  Future Office visit:       Routing refill request to provider for review/approval because:  Drug not on the G, P or [facility] refill protocol or controlled substance    
Reading material offered by provider's office

## 2024-01-08 ENCOUNTER — MEDICAL CORRESPONDENCE (OUTPATIENT)
Dept: HEALTH INFORMATION MANAGEMENT | Facility: CLINIC | Age: 79
End: 2024-01-08

## 2024-03-19 ENCOUNTER — TRANSFERRED RECORDS (OUTPATIENT)
Dept: HEALTH INFORMATION MANAGEMENT | Facility: CLINIC | Age: 79
End: 2024-03-19
Payer: MEDICARE

## 2024-04-01 ENCOUNTER — OFFICE VISIT (OUTPATIENT)
Dept: FAMILY MEDICINE | Facility: CLINIC | Age: 79
End: 2024-04-01
Payer: MEDICARE

## 2024-04-01 VITALS
OXYGEN SATURATION: 99 % | WEIGHT: 164 LBS | TEMPERATURE: 97.1 F | HEART RATE: 52 BPM | RESPIRATION RATE: 13 BRPM | DIASTOLIC BLOOD PRESSURE: 66 MMHG | HEIGHT: 59 IN | SYSTOLIC BLOOD PRESSURE: 135 MMHG | BODY MASS INDEX: 33.06 KG/M2

## 2024-04-01 DIAGNOSIS — N18.31 STAGE 3A CHRONIC KIDNEY DISEASE (H): ICD-10-CM

## 2024-04-01 DIAGNOSIS — I10 ESSENTIAL HYPERTENSION WITH GOAL BLOOD PRESSURE LESS THAN 140/90: ICD-10-CM

## 2024-04-01 DIAGNOSIS — Z01.818 PREOP GENERAL PHYSICAL EXAM: Primary | ICD-10-CM

## 2024-04-01 DIAGNOSIS — M17.12 PRIMARY OSTEOARTHRITIS OF LEFT KNEE: ICD-10-CM

## 2024-04-01 LAB
ALBUMIN SERPL BCG-MCNC: 4.1 G/DL (ref 3.5–5.2)
ALBUMIN UR-MCNC: NEGATIVE MG/DL
ALP SERPL-CCNC: 72 U/L (ref 40–150)
ALT SERPL W P-5'-P-CCNC: 15 U/L (ref 0–50)
ANION GAP SERPL CALCULATED.3IONS-SCNC: 10 MMOL/L (ref 7–15)
APPEARANCE UR: CLEAR
AST SERPL W P-5'-P-CCNC: 30 U/L (ref 0–45)
BACTERIA #/AREA URNS HPF: ABNORMAL /HPF
BILIRUB SERPL-MCNC: 0.5 MG/DL
BILIRUB UR QL STRIP: NEGATIVE
BUN SERPL-MCNC: 26.2 MG/DL (ref 8–23)
CALCIUM SERPL-MCNC: 9.7 MG/DL (ref 8.8–10.2)
CHLORIDE SERPL-SCNC: 103 MMOL/L (ref 98–107)
COLOR UR AUTO: YELLOW
CREAT SERPL-MCNC: 0.84 MG/DL (ref 0.51–0.95)
DEPRECATED HCO3 PLAS-SCNC: 26 MMOL/L (ref 22–29)
EGFRCR SERPLBLD CKD-EPI 2021: 69 ML/MIN/1.73M2
ERYTHROCYTE [DISTWIDTH] IN BLOOD BY AUTOMATED COUNT: 13.9 % (ref 10–15)
GLUCOSE SERPL-MCNC: 94 MG/DL (ref 70–99)
GLUCOSE UR STRIP-MCNC: NEGATIVE MG/DL
HCT VFR BLD AUTO: 37.9 % (ref 35–47)
HGB BLD-MCNC: 12.1 G/DL (ref 11.7–15.7)
HGB UR QL STRIP: NEGATIVE
KETONES UR STRIP-MCNC: NEGATIVE MG/DL
LEUKOCYTE ESTERASE UR QL STRIP: ABNORMAL
MCH RBC QN AUTO: 29.8 PG (ref 26.5–33)
MCHC RBC AUTO-ENTMCNC: 31.9 G/DL (ref 31.5–36.5)
MCV RBC AUTO: 93 FL (ref 78–100)
NITRATE UR QL: NEGATIVE
PH UR STRIP: 6 [PH] (ref 5–7)
PLATELET # BLD AUTO: 196 10E3/UL (ref 150–450)
POTASSIUM SERPL-SCNC: 5.3 MMOL/L (ref 3.4–5.3)
PROT SERPL-MCNC: 6.9 G/DL (ref 6.4–8.3)
RBC # BLD AUTO: 4.06 10E6/UL (ref 3.8–5.2)
RBC #/AREA URNS AUTO: ABNORMAL /HPF
SODIUM SERPL-SCNC: 139 MMOL/L (ref 135–145)
SP GR UR STRIP: 1.02 (ref 1–1.03)
SQUAMOUS #/AREA URNS AUTO: ABNORMAL /LPF
UROBILINOGEN UR STRIP-ACNC: 0.2 E.U./DL
WBC # BLD AUTO: 5.1 10E3/UL (ref 4–11)
WBC #/AREA URNS AUTO: ABNORMAL /HPF
WBC CLUMPS #/AREA URNS HPF: ABNORMAL /HPF

## 2024-04-01 PROCEDURE — 36415 COLL VENOUS BLD VENIPUNCTURE: CPT | Performed by: FAMILY MEDICINE

## 2024-04-01 PROCEDURE — 81001 URINALYSIS AUTO W/SCOPE: CPT | Performed by: FAMILY MEDICINE

## 2024-04-01 PROCEDURE — 85027 COMPLETE CBC AUTOMATED: CPT | Performed by: FAMILY MEDICINE

## 2024-04-01 PROCEDURE — 93000 ELECTROCARDIOGRAM COMPLETE: CPT | Performed by: FAMILY MEDICINE

## 2024-04-01 PROCEDURE — 80053 COMPREHEN METABOLIC PANEL: CPT | Performed by: FAMILY MEDICINE

## 2024-04-01 PROCEDURE — 99214 OFFICE O/P EST MOD 30 MIN: CPT | Mod: 25 | Performed by: FAMILY MEDICINE

## 2024-04-01 ASSESSMENT — PAIN SCALES - GENERAL: PAINLEVEL: SEVERE PAIN (7)

## 2024-04-01 NOTE — PROGRESS NOTES
Preoperative Evaluation  [facility]  [address] [place], [place] [address]E SO  [address]  [facility] [address]  Phone: [phone]  Fax: [phone]  Primary Provider: Pedro Mcclelland  Pre-op Performing Provider: DIETER GARCIA  Apr 1, 2024       Germania is a 79 year old, presenting for the following:  Pre-Op Exam        4/1/2024    10:51 AM   Additional Questions   Roomed by Kenyatta Blankenship         4/1/2024    10:51 AM   Patient Reported Additional Medications   Patient reports taking the following new medications Pravastatin Sodium 10mg     Surgical Information  Surgery/Procedure: left total knee arthroplasty   Surgery Location: [facility]  Surgeon: Gt Castillo MD   Surgery Date: 4/15/2024   Time of Surgery: 9:30 AM   Where patient plans to recover: At home with daughter-in law. Granddaughter will be taking to therapy classes  Fax number for surgical facility: Note does not need to be faxed, will be available electronically in Epic.    Assessment & Plan     The proposed surgical procedure is considered LOW risk.    Preop general physical exam  Ok for procedure  - EKG 12-lead complete w/read - Clinics  - UA with Microscopic reflex to Culture - lab collect; Future  - Comprehensive metabolic panel (BMP + Alb, Alk Phos, ALT, AST, Total. Bili, TP); Future  - CBC with platelets; Future  - UA with Microscopic reflex to Culture - lab collect  - Comprehensive metabolic panel (BMP + Alb, Alk Phos, ALT, AST, Total. Bili, TP)  - CBC with platelets    Primary osteoarthritis of left knee  Worsening pain    Essential hypertension with goal blood pressure less than 140/90  Well controlled   - Comprehensive metabolic panel (BMP + Alb, Alk Phos, ALT, AST, Total. Bili, TP); Future  - Comprehensive metabolic panel (BMP + Alb, Alk Phos, ALT, AST, Total. Bili, TP)    Stage 3a chronic kidney disease (H)  recheck    56}          - No identified additional risk factors other than  previously addressed    Antiplatelet or Anticoagulation Medication Instructions   - Patient is on no antiplatelet or anticoagulation medications.    Additional Medication Instructions  Patient is to take all scheduled medications on the day of surgery    Recommendation  APPROVAL GIVEN to proceed with proposed procedure, without further diagnostic evaluation.    Review of prior external note(s) from - Outside records from [facility]  18 minutes spent by me on the date of the encounter doing chart review, history and exam, documentation and further activities per the note    Subjective       HPI related to upcoming procedure:   Encounter Diagnoses   Name Primary?    Preop general physical exam Yes    Primary osteoarthritis of left knee     Essential hypertension with goal blood pressure less than 140/90     Stage 3a chronic kidney disease (H)              4/1/2024    10:50 AM   Preop Questions   1. Have you ever had a heart attack or stroke? No   2. Have you ever had surgery on your heart or blood vessels, such as a stent placement, a coronary artery bypass, or surgery on an artery in your head, neck, heart, or legs? No   3. Do you have chest pain with activity? No   4. Do you have a history of  heart failure? No   5. Do you currently have a cold, bronchitis or symptoms of other infection? No   6. Do you have a cough, shortness of breath, or wheezing? No   7. Do you or anyone in your family have previous history of blood clots? YES -    8. Do you or does anyone in your family have a serious bleeding problem such as prolonged bleeding following surgeries or cuts? No   9. Have you ever had problems with anemia or been told to take iron pills? No   10. Have you had any abnormal blood loss such as black, tarry or bloody stools, or abnormal vaginal bleeding? No   11. Have you ever had a blood transfusion? No   12. Are you willing to have a blood transfusion if it is medically needed before, during, or after your surgery? Yes    13. Have you or any of your relatives ever had problems with anesthesia? YES - nasuea   14. Do you have sleep apnea, excessive snoring or daytime drowsiness? YES - unchanged   14a. Do you have a CPAP machine? No   15. Do you have any artifical heart valves or other implanted medical devices like a pacemaker, defibrillator, or continuous glucose monitor? No   16. Do you have artificial joints? YES -    17. Are you allergic to latex? No       Health Care Directive  Patient does not have a Health Care Directive or Living Will: Patient states has Advance Directive and will bring in a copy to clinic.    Preoperative Review of    reviewed - no record of controlled substances prescribed.      Status of Chronic Conditions:  HYPERTENSION - Patient has longstanding history of HTN , currently denies any symptoms referable to elevated blood pressure. Specifically denies chest pain, palpitations, dyspnea, orthopnea, PND or peripheral edema. Blood pressure readings have been in normal range. Current medication regimen is as listed below. Patient denies any side effects of medication.     RENAL INSUFFICIENCY - Patient has a longstanding history of moderate-severe chronic renal insufficiency. Last Cr sent today.     Patient Active Problem List    Diagnosis Date Noted    Chronic kidney disease, stage 2 (mild) 12/04/2023     Priority: Medium    Wears hearing aid in both ears 09/29/2023     Priority: Medium    Idiopathic gout, unspecified chronicity, unspecified site 03/31/2022     Priority: Medium    Lymphedema of both lower extremities 07/01/2021     Priority: Medium    Status post total hip replacement, right 08/13/2019     Priority: Medium    Tired 12/16/2018     Priority: Medium    Peripheral polyneuropathy 04/02/2018     Priority: Medium    BMI 40.0-44.9, adult (H) 11/13/2017     Priority: Medium    Essential hypertension with goal blood pressure less than 140/90 11/11/2016     Priority: Medium    Insomnia, unspecified  type 08/01/2016     Priority: Medium     #20 Rx 4 x yearly      Decreased hearing, bilateral 06/20/2016     Priority: Medium    Gastroesophageal reflux disease, esophagitis presence not specified 06/20/2016     Priority: Medium     IMO Regulatory Load OCT 2020      Family history of thyroid disease 04/25/2014     Priority: Medium    Nonspecific abnormal electrocardiogram (ECG) (EKG) 08/29/2013     Priority: Medium    Hyperlipidemia LDL goal <130 08/14/2013     Priority: Medium    Primary osteoarthritis of left knee      Priority: Medium      Past Medical History:   Diagnosis Date    BMI 40.0-44.9, adult (H) 11/13/2017    Esophageal reflux     Gastroesophageal reflux    Left knee pain     Nonspecific abnormal electrocardiogram (ECG) (EKG) 08/29/2013    Wears hearing aid in both ears 09/29/2023     Past Surgical History:   Procedure Laterality Date    ARTHROPLASTY HIP ANTERIOR Right 8/13/2019    Procedure: RIGHT DIRECT ANTERIOR TOTAL HIP ARTHROPLASTY;  Surgeon: Gt Castillo MD;  Location: [facility] OR    CHOLECYSTECTOMY      HERNIORRHAPHY VENTRAL  4/12/2012    Procedure:HERNIORRHAPHY VENTRAL; ventral hernia repair with mesh; Surgeon:ELOISA INMAN; Location:[facility]    HYSTERECTOMY  5/24/01    uterine prolapse/ant. repair     Current Outpatient Medications   Medication Sig Dispense Refill    allopurinol (ZYLOPRIM) 100 MG tablet Take 2 tablets (200 mg) by mouth daily 180 tablet 3    aspirin (ASA) 325 MG EC tablet Take 325 mg by mouth daily at 2 pm      cyclobenzaprine (FLEXERIL) 10 MG tablet TAKE 1/2 TO 1 TABLET (5-10 MG) BY MOUTH 3 TIMES DAILY AS NEEDED FOR MUSCLE SPASMS 90 tablet 3    FLUZONE HIGH-DOSE QUADRIVALENT 0.7 ML SIOBHAN injection PHARMACY ADMINISTERED      gabapentin (NEURONTIN) 300 MG capsule Take 2 capsules (600 mg) by mouth 2 times daily as needed for neuropathic pain 360 capsule 3    losartan (COZAAR) 100 MG tablet Take 1 tablet (100 mg) by mouth daily 90 tablet 3    metoprolol succinate ER (TOPROL XL) 50 MG  "24 hr tablet Take 1 tablet (50 mg) by mouth 2 times daily 180 tablet 3    multivitamin (CENTRUM SILVER) tablet Take 1 tablet by mouth daily      potassium chloride ER (KLOR-CON) 10 MEQ CR tablet Take 1 tablet (10 mEq) by mouth daily 90 tablet 3    Semaglutide-Weight Management (WEGOVY) 0.25 MG/0.5ML pen Inject 0.25 mg Subcutaneous once a week 2 mL 0    triamcinolone (KENALOG) 0.1 % external cream Apply topically daily as needed for irritation 30 g 5    triamterene-HCTZ (MAXZIDE-25) 37.5-25 MG tablet Take 0.5 tablets by mouth daily      zolpidem (AMBIEN) 5 MG tablet Take 1 tablet (5 mg) by mouth nightly as needed for sleep 90 tablet 1       No Known Allergies     Social History     Tobacco Use    Smoking status: Never    Smokeless tobacco: Never   Substance Use Topics    Alcohol use: No       History   Drug Use No         Review of Systems    Review of Systems  Constitutional, HEENT, cardiovascular, pulmonary, gi and gu systems are negative, except as otherwise noted.    Objective    /66 (BP Location: Right arm, Patient Position: Sitting, Cuff Size: Adult Regular)   Pulse 52   Temp 97.1  F (36.2  C) (Temporal)   Resp 13   Ht 1.499 m (4' 11\")   Wt 74.4 kg (164 lb)   SpO2 99%   BMI 33.12 kg/m     Estimated body mass index is 33.12 kg/m  as calculated from the following:    Height as of this encounter: 1.499 m (4' 11\").    Weight as of this encounter: 74.4 kg (164 lb).  Physical Exam  GENERAL: alert and no distress  EYES: Eyes grossly normal to inspection, PERRL and conjunctivae and sclerae normal  HENT: ear canals and TM's normal, nose and mouth without ulcers or lesions  NECK: no adenopathy, no asymmetry, masses, or scars  RESP: lungs clear to auscultation - no rales, rhonchi or wheezes  CV: regular rate and rhythm, normal S1 S2, no S3 or S4, no murmur, click or rub, no peripheral edema  ABDOMEN: soft, nontender, no hepatosplenomegaly, no masses and bowel sounds normal  MS: no gross musculoskeletal " defects noted, no edema  SKIN: no suspicious lesions or rashes  NEURO: Normal strength and tone, mentation intact and speech normal  PSYCH: mentation appears normal, affect normal/bright  LYMPH: no cervical, supraclavicular, axillary, or inguinal adenopathy    Recent Labs   Lab Test 11/29/23  1033 09/29/23  1217   HGB  --  12.8   PLT  --  210    138   POTASSIUM 5.5* 4.5   CR 1.04* 1.09*        Diagnostics  Labs pending at this time.  Results will be reviewed when available.   EKG: sinus bradycardia, normal axis, normal intervals, no acute ST/T changes c/w ischemia, no LVH by voltage criteria, unchanged from previous tracings    Revised Cardiac Risk Index (RCRI)  The patient has the following serious cardiovascular risks for perioperative complications:   - No serious cardiac risks = 0 points     RCRI Interpretation: 0 points: Class I (very low risk - 0.4% complication rate)         Signed Electronically by: Woody Larkin MD  Copy of this evaluation report is provided to requesting physician.

## 2024-04-02 ENCOUNTER — TELEPHONE (OUTPATIENT)
Dept: SCHEDULING | Facility: CLINIC | Age: 79
End: 2024-04-02
Payer: MEDICARE

## 2024-04-02 NOTE — TELEPHONE ENCOUNTER
Forms/Letter Request    Type of form/letter:  Pre-op form    Have you been seen for this request: Yes 4/1    Do we have the form/letter: No, daughter can email if need be    When is form/letter needed by: ASAP    How would you like the form/letter returned: Fax Pt to provide fax upon call    Patient Notified form requests are processed in 3-5 business days:Yes    Okay to leave a detailed message?: Yes at Home number on file [phone] (home)

## 2024-04-05 ENCOUNTER — TELEPHONE (OUTPATIENT)
Dept: FAMILY MEDICINE | Facility: CLINIC | Age: 79
End: 2024-04-05
Payer: MEDICARE

## 2024-04-05 DIAGNOSIS — E78.5 HYPERLIPIDEMIA LDL GOAL <130: Primary | ICD-10-CM

## 2024-04-05 RX ORDER — PRAVASTATIN SODIUM 20 MG
20 TABLET ORAL DAILY
Qty: 90 TABLET | Refills: 3 | Status: SHIPPED | OUTPATIENT
Start: 2024-04-05

## 2024-04-05 NOTE — TELEPHONE ENCOUNTER
OK to stay off baby aspirin. Tumeric OK now, stop 1 week before surgery.  Pravastatin 10 mg was stopped last fall; instead should be taking 20 mg daily. OK to skip the morning of surgery  Order signed, please notify, thanks Pedro

## 2024-04-05 NOTE — TELEPHONE ENCOUNTER
Pt calling regarding pravastin 10 mg. She was told at her last appointment that it was discontinued in October? She states that she has been taking it, because she did not realize. Do you want pt to be taking this medication? Please advise    What is pt supposed to stop taking beside Asprin? Pt is also taking Tumeric that is not on her med list.  Can she continue to take this prior to surgery ? Thanks    Elsa Elder RN  [facility]

## 2024-04-12 DIAGNOSIS — G47.00 INSOMNIA, UNSPECIFIED TYPE: ICD-10-CM

## 2024-04-12 RX ORDER — ZOLPIDEM TARTRATE 5 MG/1
5 TABLET ORAL
Qty: 90 TABLET | Refills: 1 | Status: SHIPPED | OUTPATIENT
Start: 2024-04-12

## 2024-04-14 ENCOUNTER — ANESTHESIA EVENT (OUTPATIENT)
Dept: SURGERY | Facility: CLINIC | Age: 79
End: 2024-04-14
Payer: MEDICARE

## 2024-04-14 RX ORDER — ASPIRIN 325 MG
325 TABLET, DELAYED RELEASE (ENTERIC COATED) ORAL DAILY
Status: CANCELLED | OUTPATIENT
Start: 2024-04-14

## 2024-04-14 NOTE — ANESTHESIA PREPROCEDURE EVALUATION
Anesthesia Pre-Procedure Evaluation    Patient: Kyler Whitlock   MRN: [id number] : 1945        Procedure : Procedure(s):  left total knee arthroplasty          Past Medical History:   Diagnosis Date    BMI 40.0-44.9, adult (H) 2017    Chronic kidney disease, stage 2 (mild)     Esophageal reflux     Gastroesophageal reflux    Essential hypertension with goal blood pressure less than 140/90     GERD (gastroesophageal reflux disease)     Hyperlipidemia LDL goal <130     Idiopathic gout     Insomnia     Left knee pain     Lymphedema of both lower extremities     Nonspecific abnormal electrocardiogram (ECG) (EKG) 2013    Peripheral polyneuropathy     PONV (postoperative nausea and vomiting)     Primary osteoarthritis of left knee     Wears hearing aid in both ears 2023      Past Surgical History:   Procedure Laterality Date    ARTHROPLASTY HIP ANTERIOR Right 2019    Procedure: RIGHT DIRECT ANTERIOR TOTAL HIP ARTHROPLASTY;  Surgeon: Gt Castillo MD;  Location:  OR    CHOLECYSTECTOMY      HERNIORRHAPHY VENTRAL  2012    Procedure:HERNIORRHAPHY VENTRAL; ventral hernia repair with mesh; Surgeon:ELOISA INMAN; Location: [facility]    HYSTERECTOMY  01    uterine prolapse/ant. repair      No Known Allergies   Social History     Tobacco Use    Smoking status: Never    Smokeless tobacco: Never   Substance Use Topics    Alcohol use: No      Wt Readings from Last 1 Encounters:   24 74.4 kg (164 lb)        EKG  Sinus Bradycardia   -Nonspecific T-abnormality.    ABNORMAL  Anesthesia Evaluation   Pt has had prior anesthetic.     History of anesthetic complications (slow to wake up)  - PONV.      ROS/MED HX  ENT/Pulmonary: Comment: Hearing aids     (+)     JACKLYN risk factors, snores loudly, hypertension,                              (-) recent URI   Neurologic:     (+)    peripheral neuropathy, - peripheral polyneuropathy.                        (-) no seizures and migraines  "  Cardiovascular: Comment: Lymph edema of lower extremities     2019 - normal dobutamine stress echo    (+) Dyslipidemia hypertension- -   -  - -                                   (-) CHF, orthopnea/PND and arrhythmias   METS/Exercise Tolerance:     Hematologic: Comments: Sister has an issue with blood clots      Musculoskeletal: Comment: Gout   (+)  arthritis,             GI/Hepatic:     (+) GERD,                   Renal/Genitourinary:     (+) renal disease (CKD - stage 3), type: CRI,            Endo:     (+)               Obesity,    (-) Type II DM and thyroid disease   Psychiatric/Substance Use:  - neg psychiatric ROS     Infectious Disease:  - neg infectious disease ROS     Malignancy:  - neg malignancy ROS     Other:            Physical Exam    Airway  airway exam normal      Mallampati: II   TM distance: > 3 FB   Neck ROM: full   Mouth opening: > 3 cm    Respiratory Devices and Support         Dental     Comment: Dentures top and bottom    (+) Edentulous      Cardiovascular   cardiovascular exam normal       Rhythm and rate: regular and normal     Pulmonary   pulmonary exam normal        breath sounds clear to auscultation           OUTSIDE LABS:  CBC:   Lab Results   Component Value Date    WBC 5.1 04/01/2024    WBC 5.2 09/29/2023    HGB 12.1 04/01/2024    HGB 12.8 09/29/2023    HCT 37.9 04/01/2024    HCT 39.9 09/29/2023     04/01/2024     09/29/2023     BMP:   Lab Results   Component Value Date     04/01/2024     11/29/2023    POTASSIUM 5.3 04/01/2024    POTASSIUM 5.5 (H) 11/29/2023    CHLORIDE 103 04/01/2024    CHLORIDE 98 11/29/2023    CO2 26 04/01/2024    CO2 29 11/29/2023    BUN 26.2 (H) 04/01/2024    BUN 24.2 (H) 11/29/2023    CR 0.84 04/01/2024    CR 1.04 (H) 11/29/2023    GLC 94 04/01/2024    GLC 91 11/29/2023     COAGS: No results found for: \"PTT\", \"INR\", \"FIBR\"  POC:   Lab Results   Component Value Date     (H) 08/15/2019     HEPATIC:   Lab Results   Component Value " "Date    ALBUMIN 4.1 04/01/2024    PROTTOTAL 6.9 04/01/2024    ALT 15 04/01/2024    AST 30 04/01/2024    ALKPHOS 72 04/01/2024    BILITOTAL 0.5 04/01/2024     OTHER:   Lab Results   Component Value Date    RICO 9.7 04/01/2024    TSH 2.30 09/29/2023    T4 1.08 07/08/2016    T4 10.3 07/08/2016    CRP <2.9 09/26/2022    SED 26 09/26/2022       Anesthesia Plan    ASA Status:  3    NPO Status:  NPO Appropriate    Anesthesia Type: Spinal.              Consents    Anesthesia Plan(s) and associated risks, benefits, and realistic alternatives discussed. Questions answered and patient/representative(s) expressed understanding.     - Discussed:     - Discussed with:  Patient            Postoperative Care    Pain management: Peripheral nerve block (Single Shot).   PONV prophylaxis: Ondansetron (or other 5HT-3), Dexamethasone or Solumedrol     Comments:               Gt Rodriguez MD    I have reviewed the pertinent notes and labs in the chart from the past 30 days and (re)examined the patient.  Any updates or changes from those notes are reflected in this note.              # Obesity: Estimated body mass index is 33.12 kg/m  as calculated from the following:    Height as of 4/1/24: 1.499 m (4' 11\").    Weight as of 4/1/24: 74.4 kg (164 lb).      "

## 2024-04-15 ENCOUNTER — HOSPITAL ENCOUNTER (OUTPATIENT)
Facility: CLINIC | Age: 79
Discharge: HOME | End: 2024-04-16
Attending: ORTHOPAEDIC SURGERY | Admitting: ORTHOPAEDIC SURGERY
Payer: MEDICARE

## 2024-04-15 ENCOUNTER — APPOINTMENT (OUTPATIENT)
Dept: PHYSICAL THERAPY | Facility: CLINIC | Age: 79
End: 2024-04-15
Attending: ORTHOPAEDIC SURGERY
Payer: MEDICARE

## 2024-04-15 ENCOUNTER — ANESTHESIA (OUTPATIENT)
Dept: SURGERY | Facility: CLINIC | Age: 79
End: 2024-04-15
Payer: MEDICARE

## 2024-04-15 ENCOUNTER — APPOINTMENT (OUTPATIENT)
Dept: GENERAL RADIOLOGY | Facility: CLINIC | Age: 79
End: 2024-04-15
Attending: ORTHOPAEDIC SURGERY
Payer: MEDICARE

## 2024-04-15 DIAGNOSIS — Z96.652 STATUS POST TOTAL LEFT KNEE REPLACEMENT: Primary | ICD-10-CM

## 2024-04-15 PROBLEM — I89.0 LYMPHEDEMA OF BOTH LOWER EXTREMITIES: Status: ACTIVE | Noted: 2021-07-01

## 2024-04-15 LAB
CREAT SERPL-MCNC: 0.85 MG/DL (ref 0.51–0.95)
EGFRCR SERPLBLD CKD-EPI 2021: 68 ML/MIN/1.73M2
GLUCOSE SERPL-MCNC: 109 MG/DL (ref 70–99)
INR PPP: 1.05 (ref 0.85–1.15)
POTASSIUM SERPL-SCNC: 4.4 MMOL/L (ref 3.4–5.3)

## 2024-04-15 PROCEDURE — 97116 GAIT TRAINING THERAPY: CPT | Mod: GP

## 2024-04-15 PROCEDURE — 710N000009 HC RECOVERY PHASE 1, LEVEL 1, PER MIN: Performed by: ORTHOPAEDIC SURGERY

## 2024-04-15 PROCEDURE — 250N000013 HC RX MED GY IP 250 OP 250 PS 637: Performed by: ORTHOPAEDIC SURGERY

## 2024-04-15 PROCEDURE — 82565 ASSAY OF CREATININE: CPT | Performed by: ANESTHESIOLOGY

## 2024-04-15 PROCEDURE — 36415 COLL VENOUS BLD VENIPUNCTURE: CPT | Performed by: ANESTHESIOLOGY

## 2024-04-15 PROCEDURE — 258N000003 HC RX IP 258 OP 636: Performed by: ORTHOPAEDIC SURGERY

## 2024-04-15 PROCEDURE — C1776 JOINT DEVICE (IMPLANTABLE): HCPCS | Performed by: ORTHOPAEDIC SURGERY

## 2024-04-15 PROCEDURE — 99100 ANES PT EXTEME AGE<1 YR&>70: CPT | Performed by: NURSE ANESTHETIST, CERTIFIED REGISTERED

## 2024-04-15 PROCEDURE — 85610 PROTHROMBIN TIME: CPT | Performed by: ANESTHESIOLOGY

## 2024-04-15 PROCEDURE — 272N000001 HC OR GENERAL SUPPLY STERILE: Performed by: ORTHOPAEDIC SURGERY

## 2024-04-15 PROCEDURE — 258N000003 HC RX IP 258 OP 636: Performed by: ANESTHESIOLOGY

## 2024-04-15 PROCEDURE — 999N000141 HC STATISTIC PRE-PROCEDURE NURSING ASSESSMENT: Performed by: ORTHOPAEDIC SURGERY

## 2024-04-15 PROCEDURE — 97530 THERAPEUTIC ACTIVITIES: CPT | Mod: GP

## 2024-04-15 PROCEDURE — 97110 THERAPEUTIC EXERCISES: CPT | Mod: GP

## 2024-04-15 PROCEDURE — 250N000011 HC RX IP 250 OP 636: Performed by: ORTHOPAEDIC SURGERY

## 2024-04-15 PROCEDURE — 250N000009 HC RX 250: Performed by: ANESTHESIOLOGY

## 2024-04-15 PROCEDURE — 999N000065 XR KNEE PORT LEFT 1/2 VIEWS: Mod: LT

## 2024-04-15 PROCEDURE — 250N000009 HC RX 250: Performed by: ORTHOPAEDIC SURGERY

## 2024-04-15 PROCEDURE — 82947 ASSAY GLUCOSE BLOOD QUANT: CPT | Performed by: ANESTHESIOLOGY

## 2024-04-15 PROCEDURE — 258N000003 HC RX IP 258 OP 636: Performed by: NURSE ANESTHETIST, CERTIFIED REGISTERED

## 2024-04-15 PROCEDURE — 370N000017 HC ANESTHESIA TECHNICAL FEE, PER MIN: Performed by: ORTHOPAEDIC SURGERY

## 2024-04-15 PROCEDURE — 27447 TOTAL KNEE ARTHROPLASTY: CPT | Performed by: ANESTHESIOLOGY

## 2024-04-15 PROCEDURE — 360N000077 HC SURGERY LEVEL 4, PER MIN: Performed by: ORTHOPAEDIC SURGERY

## 2024-04-15 PROCEDURE — 97161 PT EVAL LOW COMPLEX 20 MIN: CPT | Mod: GP

## 2024-04-15 PROCEDURE — 99207 PR NO CHARGE LOS: CPT | Performed by: INTERNAL MEDICINE

## 2024-04-15 PROCEDURE — 27447 TOTAL KNEE ARTHROPLASTY: CPT | Performed by: NURSE ANESTHETIST, CERTIFIED REGISTERED

## 2024-04-15 PROCEDURE — 250N000013 HC RX MED GY IP 250 OP 250 PS 637: Performed by: HOSPITALIST

## 2024-04-15 PROCEDURE — 84132 ASSAY OF SERUM POTASSIUM: CPT | Performed by: ANESTHESIOLOGY

## 2024-04-15 PROCEDURE — 258N000001 HC RX 258: Performed by: ORTHOPAEDIC SURGERY

## 2024-04-15 PROCEDURE — 250N000011 HC RX IP 250 OP 636: Performed by: ANESTHESIOLOGY

## 2024-04-15 PROCEDURE — 250N000009 HC RX 250: Performed by: NURSE ANESTHETIST, CERTIFIED REGISTERED

## 2024-04-15 PROCEDURE — C1713 ANCHOR/SCREW BN/BN,TIS/BN: HCPCS | Performed by: ORTHOPAEDIC SURGERY

## 2024-04-15 PROCEDURE — 250N000011 HC RX IP 250 OP 636: Performed by: NURSE ANESTHETIST, CERTIFIED REGISTERED

## 2024-04-15 DEVICE — IMPLANTABLE DEVICE
Type: IMPLANTABLE DEVICE | Site: KNEE | Status: FUNCTIONAL
Brand: PERSONA®

## 2024-04-15 DEVICE — IMPLANTABLE DEVICE
Type: IMPLANTABLE DEVICE | Site: KNEE | Status: FUNCTIONAL
Brand: PERSONA® VIVACIT-E®

## 2024-04-15 DEVICE — FULL DOSE BONE CEMENT, 10 PACK CATALOG NUMBER IS 6191-1-010
Type: IMPLANTABLE DEVICE | Site: KNEE | Status: FUNCTIONAL
Brand: SIMPLEX

## 2024-04-15 DEVICE — IMPLANTABLE DEVICE
Type: IMPLANTABLE DEVICE | Site: KNEE | Status: FUNCTIONAL
Brand: PERSONA® NATURAL TIBIA®

## 2024-04-15 RX ORDER — PROPOFOL 10 MG/ML
INJECTION, EMULSION INTRAVENOUS PRN
Status: DISCONTINUED | OUTPATIENT
Start: 2024-04-15 | End: 2024-04-15

## 2024-04-15 RX ORDER — HYDROMORPHONE HCL IN WATER/PF 6 MG/30 ML
0.4 PATIENT CONTROLLED ANALGESIA SYRINGE INTRAVENOUS
Status: DISCONTINUED | OUTPATIENT
Start: 2024-04-15 | End: 2024-04-16 | Stop reason: HOSPADM

## 2024-04-15 RX ORDER — ONDANSETRON 2 MG/ML
4 INJECTION INTRAMUSCULAR; INTRAVENOUS EVERY 30 MIN PRN
Status: DISCONTINUED | OUTPATIENT
Start: 2024-04-15 | End: 2024-04-15 | Stop reason: HOSPADM

## 2024-04-15 RX ORDER — LIDOCAINE HYDROCHLORIDE 20 MG/ML
INJECTION, SOLUTION INFILTRATION; PERINEURAL PRN
Status: DISCONTINUED | OUTPATIENT
Start: 2024-04-15 | End: 2024-04-15

## 2024-04-15 RX ORDER — OXYCODONE HYDROCHLORIDE 5 MG/1
10 TABLET ORAL EVERY 4 HOURS PRN
Status: DISCONTINUED | OUTPATIENT
Start: 2024-04-15 | End: 2024-04-16 | Stop reason: HOSPADM

## 2024-04-15 RX ORDER — SODIUM CHLORIDE, SODIUM LACTATE, POTASSIUM CHLORIDE, CALCIUM CHLORIDE 600; 310; 30; 20 MG/100ML; MG/100ML; MG/100ML; MG/100ML
INJECTION, SOLUTION INTRAVENOUS CONTINUOUS
Status: DISCONTINUED | OUTPATIENT
Start: 2024-04-15 | End: 2024-04-15 | Stop reason: HOSPADM

## 2024-04-15 RX ORDER — METHOCARBAMOL 500 MG/1
500 TABLET, FILM COATED ORAL EVERY 6 HOURS PRN
Status: DISCONTINUED | OUTPATIENT
Start: 2024-04-15 | End: 2024-04-16 | Stop reason: HOSPADM

## 2024-04-15 RX ORDER — NALOXONE HYDROCHLORIDE 0.4 MG/ML
0.1 INJECTION, SOLUTION INTRAMUSCULAR; INTRAVENOUS; SUBCUTANEOUS
Status: DISCONTINUED | OUTPATIENT
Start: 2024-04-15 | End: 2024-04-15 | Stop reason: HOSPADM

## 2024-04-15 RX ORDER — ASPIRIN 325 MG
325 TABLET, DELAYED RELEASE (ENTERIC COATED) ORAL DAILY
Status: DISCONTINUED | OUTPATIENT
Start: 2024-04-15 | End: 2024-04-16 | Stop reason: HOSPADM

## 2024-04-15 RX ORDER — HYDROMORPHONE HCL IN WATER/PF 6 MG/30 ML
0.2 PATIENT CONTROLLED ANALGESIA SYRINGE INTRAVENOUS EVERY 5 MIN PRN
Status: DISCONTINUED | OUTPATIENT
Start: 2024-04-15 | End: 2024-04-15 | Stop reason: HOSPADM

## 2024-04-15 RX ORDER — LOSARTAN POTASSIUM 100 MG/1
100 TABLET ORAL DAILY
Status: DISCONTINUED | OUTPATIENT
Start: 2024-04-16 | End: 2024-04-16 | Stop reason: HOSPADM

## 2024-04-15 RX ORDER — ACETAMINOPHEN 325 MG/1
650 TABLET ORAL EVERY 4 HOURS PRN
Status: DISCONTINUED | OUTPATIENT
Start: 2024-04-18 | End: 2024-04-16 | Stop reason: HOSPADM

## 2024-04-15 RX ORDER — SODIUM CHLORIDE, SODIUM LACTATE, POTASSIUM CHLORIDE, CALCIUM CHLORIDE 600; 310; 30; 20 MG/100ML; MG/100ML; MG/100ML; MG/100ML
INJECTION, SOLUTION INTRAVENOUS CONTINUOUS
Status: DISCONTINUED | OUTPATIENT
Start: 2024-04-15 | End: 2024-04-16 | Stop reason: HOSPADM

## 2024-04-15 RX ORDER — ONDANSETRON 4 MG/1
4 TABLET, ORALLY DISINTEGRATING ORAL EVERY 30 MIN PRN
Status: DISCONTINUED | OUTPATIENT
Start: 2024-04-15 | End: 2024-04-15 | Stop reason: HOSPADM

## 2024-04-15 RX ORDER — ACETAMINOPHEN 325 MG/1
975 TABLET ORAL EVERY 8 HOURS
Qty: 27 TABLET | Refills: 0 | Status: DISCONTINUED | OUTPATIENT
Start: 2024-04-15 | End: 2024-04-16 | Stop reason: HOSPADM

## 2024-04-15 RX ORDER — PROPOFOL 10 MG/ML
INJECTION, EMULSION INTRAVENOUS CONTINUOUS PRN
Status: DISCONTINUED | OUTPATIENT
Start: 2024-04-15 | End: 2024-04-15

## 2024-04-15 RX ORDER — BISACODYL 10 MG
10 SUPPOSITORY, RECTAL RECTAL DAILY PRN
Status: DISCONTINUED | OUTPATIENT
Start: 2024-04-18 | End: 2024-04-16 | Stop reason: HOSPADM

## 2024-04-15 RX ORDER — CYCLOBENZAPRINE HCL 10 MG
10 TABLET ORAL EVERY 8 HOURS PRN
Status: DISCONTINUED | OUTPATIENT
Start: 2024-04-15 | End: 2024-04-16 | Stop reason: HOSPADM

## 2024-04-15 RX ORDER — NALOXONE HYDROCHLORIDE 0.4 MG/ML
0.4 INJECTION, SOLUTION INTRAMUSCULAR; INTRAVENOUS; SUBCUTANEOUS
Status: DISCONTINUED | OUTPATIENT
Start: 2024-04-15 | End: 2024-04-16 | Stop reason: HOSPADM

## 2024-04-15 RX ORDER — ALLOPURINOL 100 MG/1
200 TABLET ORAL DAILY
Status: DISCONTINUED | OUTPATIENT
Start: 2024-04-16 | End: 2024-04-16 | Stop reason: HOSPADM

## 2024-04-15 RX ORDER — METOPROLOL SUCCINATE 50 MG/1
50 TABLET, EXTENDED RELEASE ORAL 2 TIMES DAILY
Status: DISCONTINUED | OUTPATIENT
Start: 2024-04-15 | End: 2024-04-16 | Stop reason: HOSPADM

## 2024-04-15 RX ORDER — ONDANSETRON 2 MG/ML
INJECTION INTRAMUSCULAR; INTRAVENOUS PRN
Status: DISCONTINUED | OUTPATIENT
Start: 2024-04-15 | End: 2024-04-15

## 2024-04-15 RX ORDER — PROCHLORPERAZINE MALEATE 5 MG
5 TABLET ORAL EVERY 6 HOURS PRN
Status: DISCONTINUED | OUTPATIENT
Start: 2024-04-15 | End: 2024-04-16 | Stop reason: HOSPADM

## 2024-04-15 RX ORDER — ACETAMINOPHEN 325 MG/1
650 TABLET ORAL EVERY 4 HOURS PRN
Qty: 100 TABLET | Refills: 0 | Status: SHIPPED | OUTPATIENT
Start: 2024-04-15

## 2024-04-15 RX ORDER — ASPIRIN 325 MG
325 TABLET, DELAYED RELEASE (ENTERIC COATED) ORAL DAILY
Qty: 30 TABLET | Refills: 0 | Status: SHIPPED | OUTPATIENT
Start: 2024-04-15 | End: 2024-04-16

## 2024-04-15 RX ORDER — POTASSIUM CHLORIDE 750 MG/1
10 TABLET, EXTENDED RELEASE ORAL DAILY
Status: DISCONTINUED | OUTPATIENT
Start: 2024-04-16 | End: 2024-04-16 | Stop reason: HOSPADM

## 2024-04-15 RX ORDER — TRANEXAMIC ACID 650 MG/1
1950 TABLET ORAL ONCE
Status: COMPLETED | OUTPATIENT
Start: 2024-04-15 | End: 2024-04-15

## 2024-04-15 RX ORDER — LIDOCAINE 40 MG/G
CREAM TOPICAL
Status: DISCONTINUED | OUTPATIENT
Start: 2024-04-15 | End: 2024-04-15 | Stop reason: HOSPADM

## 2024-04-15 RX ORDER — POLYETHYLENE GLYCOL 3350 17 G/17G
17 POWDER, FOR SOLUTION ORAL DAILY
Status: DISCONTINUED | OUTPATIENT
Start: 2024-04-16 | End: 2024-04-16 | Stop reason: HOSPADM

## 2024-04-15 RX ORDER — OXYCODONE HYDROCHLORIDE 5 MG/1
5-10 TABLET ORAL EVERY 4 HOURS PRN
Qty: 30 TABLET | Refills: 0 | Status: SHIPPED | OUTPATIENT
Start: 2024-04-15 | End: 2024-10-02

## 2024-04-15 RX ORDER — CEFAZOLIN SODIUM/WATER 2 G/20 ML
2 SYRINGE (ML) INTRAVENOUS SEE ADMIN INSTRUCTIONS
Status: DISCONTINUED | OUTPATIENT
Start: 2024-04-15 | End: 2024-04-15 | Stop reason: HOSPADM

## 2024-04-15 RX ORDER — CEFAZOLIN SODIUM 1 G/3ML
1 INJECTION, POWDER, FOR SOLUTION INTRAMUSCULAR; INTRAVENOUS EVERY 8 HOURS
Qty: 10 ML | Refills: 0 | Status: COMPLETED | OUTPATIENT
Start: 2024-04-15 | End: 2024-04-16

## 2024-04-15 RX ORDER — LIDOCAINE 40 MG/G
CREAM TOPICAL
Status: DISCONTINUED | OUTPATIENT
Start: 2024-04-15 | End: 2024-04-16 | Stop reason: HOSPADM

## 2024-04-15 RX ORDER — HYDROXYZINE HYDROCHLORIDE 10 MG/1
10 TABLET, FILM COATED ORAL EVERY 6 HOURS PRN
Qty: 30 TABLET | Refills: 0 | Status: SHIPPED | OUTPATIENT
Start: 2024-04-15 | End: 2024-10-02

## 2024-04-15 RX ORDER — HYDROXYZINE HYDROCHLORIDE 10 MG/1
10 TABLET, FILM COATED ORAL EVERY 6 HOURS PRN
Status: DISCONTINUED | OUTPATIENT
Start: 2024-04-15 | End: 2024-04-16 | Stop reason: HOSPADM

## 2024-04-15 RX ORDER — DIPHENHYDRAMINE HCL 12.5MG/5ML
12.5 LIQUID (ML) ORAL EVERY 6 HOURS PRN
Status: DISCONTINUED | OUTPATIENT
Start: 2024-04-15 | End: 2024-04-16 | Stop reason: HOSPADM

## 2024-04-15 RX ORDER — FENTANYL CITRATE 0.05 MG/ML
50 INJECTION, SOLUTION INTRAMUSCULAR; INTRAVENOUS EVERY 5 MIN PRN
Status: DISCONTINUED | OUTPATIENT
Start: 2024-04-15 | End: 2024-04-15 | Stop reason: HOSPADM

## 2024-04-15 RX ORDER — NALOXONE HYDROCHLORIDE 0.4 MG/ML
0.2 INJECTION, SOLUTION INTRAMUSCULAR; INTRAVENOUS; SUBCUTANEOUS
Status: DISCONTINUED | OUTPATIENT
Start: 2024-04-15 | End: 2024-04-16 | Stop reason: HOSPADM

## 2024-04-15 RX ORDER — HYDROMORPHONE HCL IN WATER/PF 6 MG/30 ML
0.2 PATIENT CONTROLLED ANALGESIA SYRINGE INTRAVENOUS
Status: DISCONTINUED | OUTPATIENT
Start: 2024-04-15 | End: 2024-04-16 | Stop reason: HOSPADM

## 2024-04-15 RX ORDER — ASPIRIN 325 MG
325 TABLET, DELAYED RELEASE (ENTERIC COATED) ORAL DAILY
Status: DISCONTINUED | OUTPATIENT
Start: 2024-04-15 | End: 2024-04-15

## 2024-04-15 RX ORDER — MAGNESIUM HYDROXIDE 1200 MG/15ML
LIQUID ORAL PRN
Status: DISCONTINUED | OUTPATIENT
Start: 2024-04-15 | End: 2024-04-15 | Stop reason: HOSPADM

## 2024-04-15 RX ORDER — FENTANYL CITRATE 0.05 MG/ML
25 INJECTION, SOLUTION INTRAMUSCULAR; INTRAVENOUS EVERY 5 MIN PRN
Status: DISCONTINUED | OUTPATIENT
Start: 2024-04-15 | End: 2024-04-15 | Stop reason: HOSPADM

## 2024-04-15 RX ORDER — ONDANSETRON 2 MG/ML
4 INJECTION INTRAMUSCULAR; INTRAVENOUS EVERY 6 HOURS PRN
Status: DISCONTINUED | OUTPATIENT
Start: 2024-04-15 | End: 2024-04-16 | Stop reason: HOSPADM

## 2024-04-15 RX ORDER — TRIAMTERENE/HYDROCHLOROTHIAZID 37.5-25 MG
1 TABLET ORAL
Status: DISCONTINUED | OUTPATIENT
Start: 2024-04-17 | End: 2024-04-16 | Stop reason: HOSPADM

## 2024-04-15 RX ORDER — BUPIVACAINE HYDROCHLORIDE 7.5 MG/ML
INJECTION, SOLUTION INTRASPINAL
Status: COMPLETED | OUTPATIENT
Start: 2024-04-15 | End: 2024-04-15

## 2024-04-15 RX ORDER — OXYCODONE HYDROCHLORIDE 5 MG/1
5 TABLET ORAL EVERY 4 HOURS PRN
Status: DISCONTINUED | OUTPATIENT
Start: 2024-04-15 | End: 2024-04-16 | Stop reason: HOSPADM

## 2024-04-15 RX ORDER — ZOLPIDEM TARTRATE 5 MG/1
5 TABLET ORAL
Status: DISCONTINUED | OUTPATIENT
Start: 2024-04-15 | End: 2024-04-16 | Stop reason: HOSPADM

## 2024-04-15 RX ORDER — AMOXICILLIN 250 MG
1-2 CAPSULE ORAL 2 TIMES DAILY
Qty: 30 TABLET | Refills: 0 | Status: SHIPPED | OUTPATIENT
Start: 2024-04-15

## 2024-04-15 RX ORDER — CEFAZOLIN SODIUM/WATER 2 G/20 ML
2 SYRINGE (ML) INTRAVENOUS
Status: COMPLETED | OUTPATIENT
Start: 2024-04-15 | End: 2024-04-15

## 2024-04-15 RX ORDER — GABAPENTIN 300 MG/1
600 CAPSULE ORAL 2 TIMES DAILY PRN
Status: DISCONTINUED | OUTPATIENT
Start: 2024-04-15 | End: 2024-04-16 | Stop reason: HOSPADM

## 2024-04-15 RX ORDER — HYDROMORPHONE HCL IN WATER/PF 6 MG/30 ML
0.4 PATIENT CONTROLLED ANALGESIA SYRINGE INTRAVENOUS EVERY 5 MIN PRN
Status: DISCONTINUED | OUTPATIENT
Start: 2024-04-15 | End: 2024-04-15 | Stop reason: HOSPADM

## 2024-04-15 RX ORDER — ONDANSETRON 4 MG/1
4 TABLET, ORALLY DISINTEGRATING ORAL EVERY 6 HOURS PRN
Status: DISCONTINUED | OUTPATIENT
Start: 2024-04-15 | End: 2024-04-16 | Stop reason: HOSPADM

## 2024-04-15 RX ORDER — DEXMEDETOMIDINE HYDROCHLORIDE 4 UG/ML
INJECTION, SOLUTION INTRAVENOUS PRN
Status: DISCONTINUED | OUTPATIENT
Start: 2024-04-15 | End: 2024-04-15

## 2024-04-15 RX ORDER — EPHEDRINE SULFATE 50 MG/ML
INJECTION, SOLUTION INTRAMUSCULAR; INTRAVENOUS; SUBCUTANEOUS PRN
Status: DISCONTINUED | OUTPATIENT
Start: 2024-04-15 | End: 2024-04-15

## 2024-04-15 RX ORDER — AMOXICILLIN 250 MG
1 CAPSULE ORAL 2 TIMES DAILY
Status: DISCONTINUED | OUTPATIENT
Start: 2024-04-15 | End: 2024-04-16 | Stop reason: HOSPADM

## 2024-04-15 RX ORDER — DEXAMETHASONE SODIUM PHOSPHATE 4 MG/ML
INJECTION, SOLUTION INTRA-ARTICULAR; INTRALESIONAL; INTRAMUSCULAR; INTRAVENOUS; SOFT TISSUE PRN
Status: DISCONTINUED | OUTPATIENT
Start: 2024-04-15 | End: 2024-04-15

## 2024-04-15 RX ADMIN — ASPIRIN 325 MG: 325 TABLET, COATED ORAL at 14:45

## 2024-04-15 RX ADMIN — METOPROLOL SUCCINATE 50 MG: 50 TABLET, EXTENDED RELEASE ORAL at 21:42

## 2024-04-15 RX ADMIN — LIDOCAINE HYDROCHLORIDE 40 MG: 20 INJECTION, SOLUTION INFILTRATION; PERINEURAL at 09:17

## 2024-04-15 RX ADMIN — ACETAMINOPHEN 975 MG: 325 TABLET, FILM COATED ORAL at 21:42

## 2024-04-15 RX ADMIN — DEXMEDETOMIDINE HYDROCHLORIDE 4 MCG: 200 INJECTION INTRAVENOUS at 09:17

## 2024-04-15 RX ADMIN — ACETAMINOPHEN 975 MG: 325 TABLET, FILM COATED ORAL at 14:44

## 2024-04-15 RX ADMIN — TRANEXAMIC ACID 1950 MG: 650 TABLET ORAL at 08:30

## 2024-04-15 RX ADMIN — Medication 2 G: at 09:14

## 2024-04-15 RX ADMIN — BUPIVACAINE HYDROCHLORIDE IN DEXTROSE 1.6 ML: 7.5 INJECTION, SOLUTION SUBARACHNOID at 09:21

## 2024-04-15 RX ADMIN — CEFAZOLIN 1 G: 1 INJECTION, POWDER, FOR SOLUTION INTRAMUSCULAR; INTRAVENOUS at 18:06

## 2024-04-15 RX ADMIN — SENNOSIDES AND DOCUSATE SODIUM 1 TABLET: 50; 8.6 TABLET ORAL at 21:42

## 2024-04-15 RX ADMIN — SODIUM CHLORIDE, POTASSIUM CHLORIDE, SODIUM LACTATE AND CALCIUM CHLORIDE: 600; 310; 30; 20 INJECTION, SOLUTION INTRAVENOUS at 18:13

## 2024-04-15 RX ADMIN — DEXAMETHASONE SODIUM PHOSPHATE 10 MG: 4 INJECTION, SOLUTION INTRA-ARTICULAR; INTRALESIONAL; INTRAMUSCULAR; INTRAVENOUS; SOFT TISSUE at 09:32

## 2024-04-15 RX ADMIN — HYDROXYZINE HYDROCHLORIDE 10 MG: 10 TABLET ORAL at 21:42

## 2024-04-15 RX ADMIN — MIDAZOLAM 1 MG: 1 INJECTION INTRAMUSCULAR; INTRAVENOUS at 08:12

## 2024-04-15 RX ADMIN — SODIUM CHLORIDE, POTASSIUM CHLORIDE, SODIUM LACTATE AND CALCIUM CHLORIDE: 600; 310; 30; 20 INJECTION, SOLUTION INTRAVENOUS at 10:59

## 2024-04-15 RX ADMIN — BUPIVACAINE HYDROCHLORIDE 15 ML: 5 INJECTION, SOLUTION EPIDURAL; INTRACAUDAL at 08:16

## 2024-04-15 RX ADMIN — PROPOFOL 20 MG: 10 INJECTION, EMULSION INTRAVENOUS at 09:18

## 2024-04-15 RX ADMIN — ONDANSETRON 4 MG: 2 INJECTION INTRAMUSCULAR; INTRAVENOUS at 10:50

## 2024-04-15 RX ADMIN — PHENYLEPHRINE HYDROCHLORIDE 0.3 MCG/KG/MIN: 10 INJECTION INTRAVENOUS at 09:37

## 2024-04-15 RX ADMIN — Medication 5 MG: at 09:37

## 2024-04-15 RX ADMIN — SODIUM CHLORIDE, POTASSIUM CHLORIDE, SODIUM LACTATE AND CALCIUM CHLORIDE: 600; 310; 30; 20 INJECTION, SOLUTION INTRAVENOUS at 14:45

## 2024-04-15 RX ADMIN — PROPOFOL 100 MCG/KG/MIN: 10 INJECTION, EMULSION INTRAVENOUS at 09:21

## 2024-04-15 RX ADMIN — SODIUM CHLORIDE, POTASSIUM CHLORIDE, SODIUM LACTATE AND CALCIUM CHLORIDE: 600; 310; 30; 20 INJECTION, SOLUTION INTRAVENOUS at 08:31

## 2024-04-15 RX ADMIN — DEXMEDETOMIDINE HYDROCHLORIDE 8 MCG: 200 INJECTION INTRAVENOUS at 09:15

## 2024-04-15 ASSESSMENT — ENCOUNTER SYMPTOMS
ORTHOPNEA: 0
DYSRHYTHMIAS: 0
SEIZURES: 0

## 2024-04-15 ASSESSMENT — ACTIVITIES OF DAILY LIVING (ADL)
ADLS_ACUITY_SCORE: 27
ADLS_ACUITY_SCORE: 26
ADLS_ACUITY_SCORE: 28
ADLS_ACUITY_SCORE: 27
ADLS_ACUITY_SCORE: 28
ADLS_ACUITY_SCORE: 26
ADLS_ACUITY_SCORE: 28
ADLS_ACUITY_SCORE: 26
ADLS_ACUITY_SCORE: 28
ADLS_ACUITY_SCORE: 26
ADLS_ACUITY_SCORE: 26
ADLS_ACUITY_SCORE: 28
ADLS_ACUITY_SCORE: 27
ADLS_ACUITY_SCORE: 39

## 2024-04-15 NOTE — BRIEF OP NOTE
[facility]    Brief Operative Note    Pre-operative diagnosis: Degenerative arthritis of left knee [M17.12]  Post-operative diagnosis Same as pre-operative diagnosis    Procedure: left total knee arthroplasty, Left - Knee    Surgeon: Surgeons and Role:     * Gt Castillo MD - Primary     * Sherice Ramirez - Assisting  Anesthesia: Spinal with Block   Estimated Blood Loss: Less than 100 ml    Drains: None  Specimens:   ID Type Source Tests Collected by Time Destination   A : Bone and tissue Tissue Knee, Left OR DOCUMENTATION ONLY Gt Castillo MD 4/15/2024  9:46 AM      Findings:   Advanced DJD left knee .  Complications: None.  Implants:   Implant Name Type Inv. Item Serial No.  Lot No. LRB No. Used Action   BONE CEMENT SIMPLEX FULL DOSE 6191-1-001 - [id number] Cement, Bone BONE CEMENT SIMPLEX FULL DOSE 6191-1-001  [employer] [id number] Left 1 Implanted   BONE CEMENT SIMPLEX FULL DOSE 6191-1-001 - [id number] Cement, Bone BONE CEMENT SIMPLEX FULL DOSE 6191-1-001  [employer] [id number] Left 1 Implanted   IMP TIBIAL ZIM PSN NP STM 5DEG SZ DL -631-01 - [id number] Total Joint Component/Insert IMP TIBIAL ZIM PSN NP STM 5DEG SZ DL -934-01  [employer] [id number] Left 1 Implanted   IMP PATELLA ZIM KNEE ALL LEELA 32MM -247-32 - [id number] Total Joint Component/Insert IMP PATELLA ZIM KNEE ALL LEELA 32MM -038-32  [employer] [id number] Left 1 Implanted   KNEE FEMUR CR CEMENT CCR STD SZ 6 L - [id number] Total Joint Component/Insert KNEE FEMUR CR CEMENT CCR STD SZ 6 L  [employer] [id number] Left 1 Implanted   SURFACE ARTC 16MM PERSONA GLADYS CNGR 6-7 C-D KN LT VIVACIT-E - [id number] Total Joint Component/Insert SURFACE ARTC 16MM P[employer] CNGR 6-7 C-D KN LT VIVACIT-E  [employer] [id number] Left 1 Implanted

## 2024-04-15 NOTE — PROGRESS NOTES
04/15/24 1600   Appointment Info   Signing Clinician's Name / Credentials (PT) Thai Griffith DPT   Rehab Comments (PT) WBAT LLE, ROM as zakia   Living Environment   People in Home alone   Current Living Arrangements house   Home Accessibility stairs to enter home   Number of Stairs, Main Entrance 3   Stair Railings, Main Entrance railing on right side (ascending)   Transportation Anticipated family or friend will provide   Living Environment Comments Pt lives in house along but will have daughter in law come stay with her for 3 weeks for assist, 3 TITO with all needs met first floor   Self-Care   Usual Activity Tolerance good   Current Activity Tolerance moderate   Equipment Currently Used at Home cane, straight   Fall history within last six months no   Activity/Exercise/Self-Care Comment At baseline pt is Mod-I using SPC, has walkers to use at home, IND with all ADL's at baseline   General Information   Onset of Illness/Injury or Date of Surgery 04/15/24   Referring Physician Gt Castillo MD   Patient/Family Therapy Goals Statement (PT) go home   Pertinent History of Current Problem (include personal factors and/or comorbidities that impact the POC) Pt is a 79 y.o. female s/p L TKA on 4/15/2024, POD#0   Existing Precautions/Restrictions fall;weight bearing   Weight-Bearing Status - LLE weight-bearing as tolerated   Weight-Bearing Status - RLE full weight-bearing   Cognition   Affect/Mental Status (Cognition) WNL   Orientation Status (Cognition) oriented x 4   Pain Assessment   Patient Currently in Pain No   Integumentary/Edema   Integumentary/Edema no deficits were identifed   Posture    Posture Forward head position   Range of Motion (ROM)   Range of Motion ROM deficits secondary to surgical procedure;ROM deficits secondary to pain;ROM deficits secondary to weakness   ROM Comment AROM ~5-45   Strength (Manual Muscle Testing)   Strength (Manual Muscle Testing) Able to perform R SLR;Deficits observed  during functional mobility   Bed Mobility   Comment, (Bed Mobility) supine<>sit SBA   Transfers   Comment, (Transfers) sit<>stand with FWW CGA   Gait/Stairs (Locomotion)   [place] Level (Gait) contact guard   Assistive Device (Gait) walker, front-wheeled   Distance in Feet (Gait) 10'   Pattern (Gait) step-through   Deviations/Abnormal Patterns (Gait) antalgic;festinating/shuffling;gait speed decreased;stride length decreased;weight shifting decreased   Maintains Weight-bearing Status (Gait) able to maintain   Balance   Balance Comments impaired dynamic balance   Sensory Examination   Sensory Perception patient reports no sensory changes   Clinical Impression   Criteria for Skilled Therapeutic Intervention Yes, treatment indicated   PT Diagnosis (PT) impaired gait   Influenced by the following impairments pain, deficits with ROM, strength, balance   Functional limitations due to impairments bed mobility, transfers, amb, ADL's   Clinical Presentation (PT Evaluation Complexity) stable   Clinical Presentation Rationale PMH and clinical judgement   Clinical Decision Making (Complexity) low complexity   Planned Therapy Interventions (PT) balance training;bed mobility training;cryotherapy;gait training;home exercise program;patient/family education;ROM (range of motion);stair training;strengthening;transfer training;progressive activity/exercise   Risk & Benefits of therapy have been explained evaluation/treatment results reviewed;care plan/treatment goals reviewed;risks/benefits reviewed;current/potential barriers reviewed;participants voiced agreement with care plan;participants included;patient   PT Total Evaluation Time   PT Eval, Low Complexity Minutes (11867) 12   Physical Therapy Goals   PT Frequency 2x/day   PT Predicted Duration/Target Date for Goal Attainment 04/16/24   PT Goals Bed Mobility;Transfers;Gait;Stairs   PT: Bed Mobility Supervision/stand-by assist;Supine to/from sit;Rolling;Bridging   PT:  Transfers Supervision/stand-by assist;Sit to/from stand;Assistive device   PT: Gait Supervision/stand-by assist;Rolling walker;150 feet   PT: Stairs Minimal assist;3 stairs;Rail on right   Interventions   Interventions Quick Adds Gait Training;Therapeutic Activity;Therapeutic Procedure   Therapeutic Procedure/Exercise   Ther. Procedure: strength, endurance, ROM, flexibillity Minutes (69309) 5   Symptoms Noted During/After Treatment none   Treatment Detail/Skilled Intervention Educated on post surgical benefits of TE on circulation, functional ROM, and strength. Left pt with TE handout. With pt in supine cued for AP's x 20, on LLE: quad sets x 10, heel slides x 10   Therapeutic Activity   Therapeutic Activities: dynamic activities to improve functional performance Minutes (24815) 20   Symptoms Noted During/After Treatment Fatigue   Treatment Detail/Skilled Intervention Greeted pt supine in bed. Eval completed. Educated on orders for WBAT, ROM to flexion tolerance, and importance of keeping knee straight when resting and demonstrated how to do so. With HOB elevated supine>sit SBA cues for sequencing and use of bedrail pt moving very slowly. After walking with HOB elevated sit>supine CGA. Pt able to reposition in bed Mod-I using bedrail. Sit<>stand with FWW CGA, cues for safe walker and hand placement and pt able to demo back well. Pt amb to BR and performed stand<>sit from RTS using FWW and SBA cues for set up and hand placement. Increased time for line management and room set up   Gait Training   Gait Training Minutes (22770) 14   Symptoms Noted During/After Treatment (Gait Training) fatigue   Treatment Detail/Skilled Intervention With pt standing EOB, cues for pre gait marching in place standing in FWW with CGA, pt able to clear feet well enough to walk away from bed. Pt amb into hallway ~125' with FWW and CGA progresing to close SBA. Pt moves with very slow gait speed and shuffled step-through pattern with short  step lengths. Appears steady with no overt LOB. Cues for safe walker placement and forward gaze   Distance in Feet 125'   [place] Level (Gait Training) stand-by assist   Physical Assistance Level (Gait Training) supervision   Weight Bearing (Gait Training) weight-bearing as tolerated   Assistive Device (Gait Training) rolling walker   PT Discharge Planning   PT Plan review/progress HEP, progress gait with FWW, trial steps, progress transfers and bed mobility   PT Discharge Recommendation (DC Rec)   (defer to ortho)   PT Rationale for DC Rec Pt below baseline but moving well and using safe technique, was able to amb with FWW. Anticipate pt will progress and by discharge be at/near SBA bed mobility, SBA transfers with FWW, SBA amb with FWW, Dev for steps. Pt will have good 24/7 support at home from daughter in law   PT Brief overview of current status CGA-SBA with FWW   Total Session Time   Timed Code Treatment Minutes 39   Total Session Time (sum of timed and untimed services) 51

## 2024-04-15 NOTE — ANESTHESIA PROCEDURE NOTES
Adductor canal Procedure Note    Pre-Procedure   Staff -        Anesthesiologist:  Gt Rodriguez MD       Performed By: Anesthesiologist       Location: pre-op       Pre-Anesthestic Checklist: patient identified, IV checked, site marked, risks and benefits discussed, informed consent, monitors and equipment checked, pre-op evaluation, at physician/surgeon's request and post-op pain management  Timeout:       Correct Patient: Yes        Correct Procedure: Yes        Correct Site: Yes        Correct Position: Yes        Correct Laterality: Yes        Site Marked: Yes  Procedure Documentation  Procedure: Adductor canal       Laterality: left       Patient Position: supine       Skin prep: Chloraprep       Local skin infiltrated with 3 mL of 1% lidocaine.        Needle Type: insulated and short bevel       Needle Gauge: 21.        Needle Length (millimeters): 100        Ultrasound guided       1. Ultrasound was used to identify targeted nerve, plexus, vascular marker, or fascial plane and place a needle adjacent to it in real-time.       2. Ultrasound was used to visualize the spread of anesthetic in close proximity to the above referenced structure.       3. A permanent image is entered into the patient's record.       4. The visualized anatomic structures appeared normal.       5. There were no apparent abnormal pathologic findings.    Assessment/Narrative         The placement was negative for: blood aspirated, painful injection and site bleeding       Paresthesias: No.       Bolus given via needle. no blood aspirated via catheter.        Secured via.        Insertion/Infusion Method: Single Shot       Complications: none    Medication(s) Administered   Bupivacaine 0.5% w/ 1:400K Epi (Injection) - Injection   15 mL - 4/15/2024 8:16:00 AM   Comments:  Ultrasound Interpretation, Peripheral Nerve Block    1. Under ultrasound guidance, the needle was inserted and placed in close proximity to the target nerve(s).  2.  "Ultrasound was also used to visualize the spread of the anesthetic in close proximity to the nerve(s) being blocked.  Local anesthetic was administered in incremental doses, with intermittent negative aspiration.    3. The nerve(s) appeared anatomically normal.  4. There were no apparent abnormal pathological findings.  5. A permanent ultrasound image was saved in the patient's record.    Pt tolerated well.    No complications.      The surgeon has given a verbal order transferring care of this patient to me for the performance of a regional analgesia block for post-op pain control. It is requested of me because I am uniquely trained and qualified to perform this block and the surgeon is neither trained nor qualified to perform this procedure.      FOR [facility] ([facility] [place]) ONLY:   Pain Team Contact information: please page the Pain Team Via [employer]. Search \"Pain\". During daytime hours, please page the attending first. At night please page the resident first.      "

## 2024-04-15 NOTE — OP NOTE
Procedure Date: 04/15/24     PREOPERATIVE DIAGNOSIS:  Degenerative arthritis of the left knee.    POSTOPERATIVE DIAGNOSIS:  Degenerative arthritis of the left knee.     PROCEDURE PERFORMED:  left total knee arthroplasty.     SURGEON:  Gt Castillo MD     FIRST ASSISTANT:  IDA Zheng     SECOND ASSISTANT: Sherice Ramirez ATC     DESCRIPTION OF PROCEDURE:  The patient was brought to the operating room and given an adductor canal block.  They were then given a Spinal anesthetic.  Their left knee and left lower extremity were then prepped and draped in the usual sterile fashion.  The entire procedure was performed without the use of a tourniquet.  An incision was made over the anterior aspect of the knee.  This was carried down through the subcutaneous tissues, down to the fascia.  A standard median parapatellar approach was taken to the joint, which revealed advanced degenerative changes consistent with the pre-operative imaging studies.  The fat pad was excised.  Menisci were excised.  The ACL was excised.  A drill hole was then made in the distal femur and using the intramedullary guide set to 5 degrees from the anatomic axis, the femur was cut to accept a size 6 Persona cruciate retaining femoral component.  The tibia was cut using the external alignment guide to accept a size D Persona tibial component and the patella was cut to accept a size 32 patella.  The knee was then trialed.  The alignment appeared satisfactory.  Ligaments were stable and balanced.  Trial components were removed.  The tibia was punched.  We then injected the posterior capsule with the periarticular analgesic cocktail.  Hemostasis was obtained.  The knee was then thoroughly irrigated and dried and the real components were cemented into place.  While the cement was hardening, we irrigated the knee with a dilute solution of Betadine.  It was allowed to sit within the knee for 3 minutes and was thoroughly irrigated from the knee  with a liter of normal saline.  Once the cement had hardened, all extraneous cement was removed.  We trialed the knee, and it appeared that a 16 mm insert gave the best fit.  A real 16 mm tibial polyethylene insert was then snapped into the tibial component.  The knee had full range of motion.  The patella tracked well.  The wound was then irrigated once again with saline.  The fascia was closed with interrupted 0 Vicryl sutures.  The skin was closed with 2-0 Vicryl subcutaneous sutures and staples.  A sterile compressive dressing was applied to the knee.  The patient was then awakened from anesthesia and transferred to postanesthesia recovery in satisfactory condition.     It should be noted that my assistants were necessary throughout the entire procedure to assist with retraction and positioning.     Gt Castillo MD

## 2024-04-15 NOTE — ANESTHESIA CARE TRANSFER NOTE
Patient: Kyler Whitlock    Procedure: Procedure(s):  left total knee arthroplasty       Diagnosis: Degenerative arthritis of left knee [M17.12]  Diagnosis Additional Information: No value filed.    Anesthesia Type:   Spinal     Note:    Oropharynx: oropharynx clear of all foreign objects and spontaneously breathing  Level of Consciousness: awake  Oxygen Supplementation: face mask  Level of Supplemental Oxygen (L/min / FiO2): 6  Independent Airway: airway patency satisfactory and stable  Dentition: dentition unchanged  Vital Signs Stable: post-procedure vital signs reviewed and stable  Report to RN Given: handoff report given  Patient transferred to: PACU  Comments: Pt to PACU with O2 via mask, airway patent, VSS. Report to RN.  Handoff Report: Identifed the Patient, Identified the Reponsible Provider, Reviewed the pertinent medical history, Discussed the surgical course, Reviewed Intra-OP anesthesia mangement and issues during anesthesia, Set expectations for post-procedure period and Allowed opportunity for questions and acknowledgement of understanding  Vitals:  Vitals Value Taken Time   /60 04/15/24 1134   Temp     Pulse 58 04/15/24 1136   Resp 22 04/15/24 1136   SpO2 100 % 04/15/24 1136   Vitals shown include unfiled device data.    Electronically Signed By: CHIKI Burks CRNA  April 15, 2024  11:37 AM

## 2024-04-15 NOTE — PROGRESS NOTES
Patient vital signs are at baseline: No, Is bradycardiac  Patient able to ambulate as they were prior to admission or with assist devices provided by therapies during their stay:  No,  Reason:  Pt hasn't been OOB yet  Patient MUST void prior to discharge:  No,  Reason: Pt is DTV  Patient able to tolerate oral intake:  Yes  Pain has adequate pain control using Oral analgesics:  Yes  Does patient have an identified :  Yes  Has goal D/C date and time been discussed with patient:  Yes

## 2024-04-15 NOTE — ANESTHESIA PROCEDURE NOTES
"Intrathecal Procedure Note    Pre-Procedure   Staff -        Anesthesiologist:  Gt Rodriguez MD       Performed By: anesthesiologist       Location: OR       Pre-Anesthestic Checklist: patient identified, IV checked, site marked, risks and benefits discussed, informed consent, monitors and equipment checked and pre-op evaluation  Timeout:       Correct Patient: Yes        Correct Procedure: Yes        Correct Site: Yes        Correct Position: Yes   Procedure Documentation  Procedure: intrathecal       Patient Position: sitting       Patient Prep/Sterile Barriers: sterile gloves, mask, patient draped       Skin prep: Betadine       Insertion Site: L3-4. (midline approach).       Needle Gauge: 24.        Needle Length (Inches): 4        Spinal Needle Type: Pencan       Introducer used       Introducer: 20 G       # of attempts: 1 and  # of redirects:  0    Assessment/Narrative         Paresthesias: No.       CSF fluid: clear.    Medication(s) Administered   0.75% Hyperbaric Bupivacaine (Intrathecal) - Intrathecal   1.6 mL - 4/15/2024 9:21:00 AM   Comments:  Patient tolerated procedure well   No complications      FOR [facility] ([facility] [place]) ONLY:   Pain Team Contact information: please page the Pain Team Via [employer]. Search \"Pain\". During daytime hours, please page the attending first. At night please page the resident first.      "

## 2024-04-16 ENCOUNTER — APPOINTMENT (OUTPATIENT)
Dept: OCCUPATIONAL THERAPY | Facility: CLINIC | Age: 79
End: 2024-04-16
Attending: ORTHOPAEDIC SURGERY
Payer: MEDICARE

## 2024-04-16 ENCOUNTER — APPOINTMENT (OUTPATIENT)
Dept: PHYSICAL THERAPY | Facility: CLINIC | Age: 79
End: 2024-04-16
Attending: ORTHOPAEDIC SURGERY
Payer: MEDICARE

## 2024-04-16 VITALS
SYSTOLIC BLOOD PRESSURE: 140 MMHG | DIASTOLIC BLOOD PRESSURE: 51 MMHG | TEMPERATURE: 98.4 F | HEIGHT: 59 IN | OXYGEN SATURATION: 98 % | RESPIRATION RATE: 18 BRPM | WEIGHT: 171 LBS | HEART RATE: 53 BPM | BODY MASS INDEX: 34.47 KG/M2

## 2024-04-16 LAB
GLUCOSE BLDC GLUCOMTR-MCNC: 117 MG/DL (ref 70–99)
HGB BLD-MCNC: 9.5 G/DL (ref 11.7–15.7)

## 2024-04-16 PROCEDURE — 36415 COLL VENOUS BLD VENIPUNCTURE: CPT | Performed by: ORTHOPAEDIC SURGERY

## 2024-04-16 PROCEDURE — 258N000003 HC RX IP 258 OP 636: Performed by: ORTHOPAEDIC SURGERY

## 2024-04-16 PROCEDURE — 99207 PR NO CHARGE LOS: CPT | Performed by: INTERNAL MEDICINE

## 2024-04-16 PROCEDURE — 97116 GAIT TRAINING THERAPY: CPT | Mod: GP,CQ | Performed by: PHYSICAL THERAPY ASSISTANT

## 2024-04-16 PROCEDURE — 82962 GLUCOSE BLOOD TEST: CPT

## 2024-04-16 PROCEDURE — 250N000011 HC RX IP 250 OP 636: Performed by: ORTHOPAEDIC SURGERY

## 2024-04-16 PROCEDURE — 85018 HEMOGLOBIN: CPT | Performed by: ORTHOPAEDIC SURGERY

## 2024-04-16 PROCEDURE — 97530 THERAPEUTIC ACTIVITIES: CPT | Mod: GP,CQ | Performed by: PHYSICAL THERAPY ASSISTANT

## 2024-04-16 PROCEDURE — 250N000013 HC RX MED GY IP 250 OP 250 PS 637: Performed by: ORTHOPAEDIC SURGERY

## 2024-04-16 PROCEDURE — 97165 OT EVAL LOW COMPLEX 30 MIN: CPT | Mod: GO

## 2024-04-16 PROCEDURE — 97535 SELF CARE MNGMENT TRAINING: CPT | Mod: GO

## 2024-04-16 PROCEDURE — 97110 THERAPEUTIC EXERCISES: CPT | Mod: GP,CQ | Performed by: PHYSICAL THERAPY ASSISTANT

## 2024-04-16 PROCEDURE — 250N000013 HC RX MED GY IP 250 OP 250 PS 637: Performed by: INTERNAL MEDICINE

## 2024-04-16 RX ADMIN — POTASSIUM CHLORIDE 10 MEQ: 750 TABLET, EXTENDED RELEASE ORAL at 09:46

## 2024-04-16 RX ADMIN — ACETAMINOPHEN 975 MG: 325 TABLET, FILM COATED ORAL at 06:01

## 2024-04-16 RX ADMIN — ALLOPURINOL 200 MG: 100 TABLET ORAL at 09:45

## 2024-04-16 RX ADMIN — SENNOSIDES AND DOCUSATE SODIUM 1 TABLET: 50; 8.6 TABLET ORAL at 09:46

## 2024-04-16 RX ADMIN — LOSARTAN POTASSIUM 100 MG: 100 TABLET, FILM COATED ORAL at 09:45

## 2024-04-16 RX ADMIN — POLYETHYLENE GLYCOL 3350 17 G: 17 POWDER, FOR SOLUTION ORAL at 09:45

## 2024-04-16 RX ADMIN — SODIUM CHLORIDE, POTASSIUM CHLORIDE, SODIUM LACTATE AND CALCIUM CHLORIDE: 600; 310; 30; 20 INJECTION, SOLUTION INTRAVENOUS at 02:37

## 2024-04-16 RX ADMIN — ASPIRIN 325 MG: 325 TABLET, COATED ORAL at 09:45

## 2024-04-16 RX ADMIN — CEFAZOLIN 1 G: 1 INJECTION, POWDER, FOR SOLUTION INTRAMUSCULAR; INTRAVENOUS at 00:19

## 2024-04-16 ASSESSMENT — ACTIVITIES OF DAILY LIVING (ADL)
ADLS_ACUITY_SCORE: 27
ADLS_ACUITY_SCORE: 29

## 2024-04-16 NOTE — PROGRESS NOTES
"POD# 1    SUBJECTIVE  Pain well managed  Mobility improving    OBJECTIVE:   /50 (BP Location: Left arm, Cuff Size: Adult Regular)   Pulse 54   Temp 98.3  F (36.8  C) (Oral)   Resp 20   Ht 1.499 m (4' 11\")   Wt 77.6 kg (171 lb)   SpO2 96%   BMI 34.54 kg/m     Hemoglobin   Date Value Ref Range Status   04/01/2024 12.1 11.7 - 15.7 g/dL Final   01/26/2021 12.8 11.7 - 15.7 g/dL Final   ]   Wound: Dressing dry  Hgb pending    ASSESSMENT   Stable    PLAN   Mobilize in PT  Home later today  Follow up 2 weeks post-op    Gt Castillo MD   "

## 2024-04-16 NOTE — PROGRESS NOTES
04/16/24 0900   Appointment Info   Signing Clinician's Name / Credentials (OT) Alena Stephens OTR/L   Rehab Comments (OT) L TKA, WBAT   Living Environment   People in Home alone   Current Living Arrangements house   Home Accessibility stairs to enter home   Number of Stairs, Main Entrance 3   Stair Railings, Main Entrance railing on right side (ascending)   Transportation Anticipated family or friend will provide   Living Environment Comments Pt lives in house and daughter in law plans to come and stay w/ her for a few weeks to assist, 3 TITO with all needs met on first floor.  (has a tub shower w/ grab bar)   Self-Care   Usual Activity Tolerance good   Current Activity Tolerance moderate   Equipment Currently Used at Home cane, straight   Fall history within last six months no   Activity/Exercise/Self-Care Comment Pt ambulates w/ SPC normally but has a variety of walkers at home too. IND w/ all ADLs at baseline. Has a tub shower w/ a grab bar   Instrumental Activities of Daily Living (IADL)   IADL Comments IND at baseline but DIL will be staying w/ patient for 3 weeks to help with anything as needed.   General Information   Onset of Illness/Injury or Date of Surgery 04/15/24   Referring Physician Gt Castillo MD   Patient/Family Therapy Goal Statement (OT) return home   Additional Occupational Profile Info/Pertinent History of Current Problem s/p TKA POD1   Existing Precautions/Restrictions fall   Left Lower Extremity (Weight-bearing Status) weight-bearing as tolerated (WBAT)   General Observations and Info still w/ ACE wrap on at time of session, RN was unsure if it should be removed   Cognitive Status Examination   Orientation Status orientation to person, place and time   Cognitive Status Comments mild safety awareness deficits, MD entered room and discussed medication recommendations and recommendation to stop taking a certain medication while also taking pain medications, pt w/ some insight deificits  and still wanting to take them.   Visual Perception   Impact of Vision Impairment on Function (Vision) glasses full time   Sensory   Sensory Comments unable to assess LLE sensation d/t ace wrap, pt not expressing any sensory concerns   Pain Assessment   Patient Currently in Pain No   Range of Motion Comprehensive   Comment, General Range of Motion BUE WFL   Strength Comprehensive (MMT)   Comment, General Manual Muscle Testing (MMT) Assessment BUE WFL, LLE impaired s/p TKA   Coordination   Coordination Comments no deficits   Bed Mobility   Comment (Bed Mobility) SBA from flat position w/o bed rail   Transfers   Transfer Comments CGA-SBA   Balance   Balance Comments no deficits observed, some confusion on if pt should be using FWW vs 4WW at home, pt understood PT recs but daughter reports feeling that she should be ok to use 4WW, defer recs to PT   Activities of Daily Living   BADL Assessment/Intervention lower body dressing   Lower Body Dressing Assessment/Training   Comment, (Lower Body Dressing) VC and set up, Min A to don L sock over ace wrap   Clinical Impression   Criteria for Skilled Therapeutic Interventions Met (OT) Yes, treatment indicated   OT Diagnosis impaired ADL IND   OT Problem List-Impairments impacting ADL problems related to;strength;mobility;post-surgical precautions   Assessment of Occupational Performance 1-3 Performance Deficits   Identified Performance Deficits dressing, driving, home mgmt   Planned Therapy Interventions (OT) ADL retraining;home program guidelines   Clinical Decision Making Complexity (OT) problem focused assessment/low complexity   OT Total Evaluation Time   OT Eval, Low Complexity Minutes (28620) 9   OT Goals   Therapy Frequency (OT) One time eval and treatment   OT Predicted Duration/Target Date for Goal Attainment 04/16/24   OT Goals Lower Body Dressing;Toilet Transfer/Toileting   OT: Lower Body Dressing Supervision/stand-by assist   OT: Toilet Transfer/Toileting  Supervision/stand-by assist   Interventions   Interventions Quick Adds Self-Care/Home Management   Self-Care/Home Management   Self-Care/Home Mgmt/ADL, Compensatory, Meal Prep Minutes (88391) 23   Treatment Detail/Skilled Intervention OT: SBA supine > sit EOB w/ HOB flat and removed bed rails. Edu on dressing technique, pt askingabout aCE wrap, called RN and RN was unsure if it should be removed, allowed pt to dress over ACE wrap. SBA for most of dressing after edu on technique, ambulates short distance in room w/ FWW, x4 STS throughout dressing SBA using FWW for stability, Min A to don L sock over ACE wrap and don shoe but pt able to reach and tie, expect no concern w/ LB dsg; DIL present at end of session. All education provided, pt has grab bar in shower, all questions answered, sitting up in chair w/ chair alarm on at end of session. MD present for brief period of time during session and MD discussing med recs; pt w/ some limited insight into medication side effects and recommendation d/t med interaction. Defer further rec to medical team, reinforced edu that MD provided for increased carry over.   OT Discharge Planning   OT Plan discharge, all IP OT goals met   OT Discharge Recommendation (DC Rec)   (defer  to ortho)   OT Rationale for DC Rec Pt functioning below baseline s/p TKA but has good family support for IADLs as needed. Owns FWW.   Total Session Time   Timed Code Treatment Minutes 23   Total Session Time (sum of timed and untimed services) 32

## 2024-04-16 NOTE — PROGRESS NOTES
Consult ordered for medical care of patient.    Patient VSS and pain controlled per notes.     Home meds reviewed and reconciled appropriately.     Formal consult to be completed in morning.        IRISH TOWNSEND M.D.

## 2024-04-16 NOTE — ANESTHESIA POSTPROCEDURE EVALUATION
Patient: Kyler Whitlock    Procedure: Procedure(s):  left total knee arthroplasty       Anesthesia Type:  Spinal    Note:     Postop Pain Control: Uneventful            Sign Out: Well controlled pain   PONV: No   Neuro/Psych: Uneventful            Sign Out: Acceptable/Baseline neuro status   Airway/Respiratory: Uneventful            Sign Out: Acceptable/Baseline resp. status   CV/Hemodynamics: Uneventful            Sign Out: Acceptable CV status; No obvious hypovolemia; No obvious fluid overload   Other NRE: NONE   DID A NON-ROUTINE EVENT OCCUR? No           Last vitals:  Vitals Value Taken Time   /68 04/15/24 1230   Temp 36.3  C (97.4  F) 04/15/24 1212   Pulse 52 04/15/24 1253   Resp 14 04/15/24 1253   SpO2 100 % 04/15/24 1253   Vitals shown include unfiled device data.    Electronically Signed By: Gt Rodriguez MD  April 15, 2024  7:04 PM

## 2024-04-16 NOTE — PROGRESS NOTES

## 2024-04-16 NOTE — CONSULTS
"Consult received for \"Hospitalist Consult\"     Briefly met with patient. She has no active medical complaints or concerns at this time. Currently working with OT and doing well, anticipated to discharge home later today. Completed discharge medication navigator to resume PTA medications. Discussed with her risks of falls with polypharmacy, encouraged to omit zolpidem if taking pain medication before bed. No concerns for discharge from hospitalist perspective. Please contact if new medical issues or concerns arise before discharge.     No charge note.     Chapin Tony MD   Hospitalist  [phone]   "

## 2024-04-16 NOTE — PLAN OF CARE
Goal Outcome Evaluation:       Patient has been discharged to home April 16, 2024 1:06 PM. Discharge instructions and education reviewed, medication schedule and follow up appts discussed. Questions answered. PIV removed. All belongings and medications sent with pt. Dtr for transport. Left by wheelchair to door 6.

## 2024-04-30 ENCOUNTER — TRANSFERRED RECORDS (OUTPATIENT)
Dept: HEALTH INFORMATION MANAGEMENT | Facility: CLINIC | Age: 79
End: 2024-04-30
Payer: MEDICARE

## 2024-05-11 NOTE — TELEPHONE ENCOUNTER
Prescription approved per [facility].    Kalpana Neal RN   [facility]         Attending Only

## 2024-05-31 ENCOUNTER — TRANSFERRED RECORDS (OUTPATIENT)
Dept: HEALTH INFORMATION MANAGEMENT | Facility: CLINIC | Age: 79
End: 2024-05-31
Payer: MEDICARE

## 2024-07-07 DIAGNOSIS — I10 ESSENTIAL HYPERTENSION WITH GOAL BLOOD PRESSURE LESS THAN 140/90: ICD-10-CM

## 2024-07-08 RX ORDER — POTASSIUM CHLORIDE 750 MG/1
10 TABLET, EXTENDED RELEASE ORAL DAILY
Qty: 90 TABLET | Refills: 3 | Status: SHIPPED | OUTPATIENT
Start: 2024-07-08

## 2024-08-16 ENCOUNTER — MEDICAL CORRESPONDENCE (OUTPATIENT)
Dept: HEALTH INFORMATION MANAGEMENT | Facility: CLINIC | Age: 79
End: 2024-08-16

## 2024-10-02 ENCOUNTER — OFFICE VISIT (OUTPATIENT)
Dept: FAMILY MEDICINE | Facility: CLINIC | Age: 79
End: 2024-10-02
Payer: MEDICARE

## 2024-10-02 VITALS
HEART RATE: 56 BPM | HEIGHT: 60 IN | WEIGHT: 178 LBS | OXYGEN SATURATION: 99 % | BODY MASS INDEX: 34.95 KG/M2 | RESPIRATION RATE: 18 BRPM | DIASTOLIC BLOOD PRESSURE: 86 MMHG | SYSTOLIC BLOOD PRESSURE: 152 MMHG | TEMPERATURE: 98.6 F

## 2024-10-02 DIAGNOSIS — G62.9 PERIPHERAL POLYNEUROPATHY: ICD-10-CM

## 2024-10-02 DIAGNOSIS — E78.5 HYPERLIPIDEMIA LDL GOAL <130: ICD-10-CM

## 2024-10-02 DIAGNOSIS — Z00.00 ENCOUNTER FOR PREVENTIVE HEALTH EXAMINATION: Primary | ICD-10-CM

## 2024-10-02 DIAGNOSIS — E66.01 MORBID OBESITY (H): ICD-10-CM

## 2024-10-02 DIAGNOSIS — G47.00 INSOMNIA, UNSPECIFIED TYPE: ICD-10-CM

## 2024-10-02 DIAGNOSIS — M10.00 IDIOPATHIC GOUT, UNSPECIFIED CHRONICITY, UNSPECIFIED SITE: ICD-10-CM

## 2024-10-02 DIAGNOSIS — I10 ELEVATED BLOOD PRESSURE READING IN OFFICE WITH DIAGNOSIS OF HYPERTENSION: ICD-10-CM

## 2024-10-02 DIAGNOSIS — E87.5 HYPERKALEMIA: ICD-10-CM

## 2024-10-02 LAB
ANION GAP SERPL CALCULATED.3IONS-SCNC: 8 MMOL/L (ref 7–15)
BUN SERPL-MCNC: 24.7 MG/DL (ref 8–23)
CALCIUM SERPL-MCNC: 9.9 MG/DL (ref 8.8–10.4)
CHLORIDE SERPL-SCNC: 104 MMOL/L (ref 98–107)
CHOLEST SERPL-MCNC: 182 MG/DL
CREAT SERPL-MCNC: 0.88 MG/DL (ref 0.51–0.95)
EGFRCR SERPLBLD CKD-EPI 2021: 66 ML/MIN/1.73M2
ERYTHROCYTE [DISTWIDTH] IN BLOOD BY AUTOMATED COUNT: 14.3 % (ref 10–15)
FASTING STATUS PATIENT QL REPORTED: YES
FASTING STATUS PATIENT QL REPORTED: YES
GLUCOSE SERPL-MCNC: 95 MG/DL (ref 70–99)
HCO3 SERPL-SCNC: 28 MMOL/L (ref 22–29)
HCT VFR BLD AUTO: 37.2 % (ref 35–47)
HDLC SERPL-MCNC: 79 MG/DL
HGB BLD-MCNC: 12 G/DL (ref 11.7–15.7)
LDLC SERPL CALC-MCNC: 92 MG/DL
MCH RBC QN AUTO: 29.1 PG (ref 26.5–33)
MCHC RBC AUTO-ENTMCNC: 32.3 G/DL (ref 31.5–36.5)
MCV RBC AUTO: 90 FL (ref 78–100)
NONHDLC SERPL-MCNC: 103 MG/DL
PLATELET # BLD AUTO: 220 10E3/UL (ref 150–450)
POTASSIUM SERPL-SCNC: 5.7 MMOL/L (ref 3.4–5.3)
RBC # BLD AUTO: 4.13 10E6/UL (ref 3.8–5.2)
SODIUM SERPL-SCNC: 140 MMOL/L (ref 135–145)
TRIGL SERPL-MCNC: 55 MG/DL
WBC # BLD AUTO: 7.8 10E3/UL (ref 4–11)

## 2024-10-02 PROCEDURE — 80048 BASIC METABOLIC PNL TOTAL CA: CPT | Performed by: FAMILY MEDICINE

## 2024-10-02 PROCEDURE — G0439 PPPS, SUBSEQ VISIT: HCPCS | Mod: 4MD | Performed by: FAMILY MEDICINE

## 2024-10-02 PROCEDURE — 36415 COLL VENOUS BLD VENIPUNCTURE: CPT | Performed by: FAMILY MEDICINE

## 2024-10-02 PROCEDURE — 80061 LIPID PANEL: CPT | Performed by: FAMILY MEDICINE

## 2024-10-02 PROCEDURE — 85027 COMPLETE CBC AUTOMATED: CPT | Performed by: FAMILY MEDICINE

## 2024-10-02 SDOH — HEALTH STABILITY: PHYSICAL HEALTH: ON AVERAGE, HOW MANY DAYS PER WEEK DO YOU ENGAGE IN MODERATE TO STRENUOUS EXERCISE (LIKE A BRISK WALK)?: 6 DAYS

## 2024-10-02 SDOH — HEALTH STABILITY: PHYSICAL HEALTH: ON AVERAGE, HOW MANY MINUTES DO YOU ENGAGE IN EXERCISE AT THIS LEVEL?: 40 MIN

## 2024-10-02 ASSESSMENT — SOCIAL DETERMINANTS OF HEALTH (SDOH): HOW OFTEN DO YOU GET TOGETHER WITH FRIENDS OR RELATIVES?: NEVER

## 2024-10-02 NOTE — PROGRESS NOTES
"Preventive Care Visit  [facility] PRIMARY [facility]  Pedro Mcclelland MD, Family Medicine  Oct 2, 2024      Assessment & Plan     Encounter for preventive health examination  due    Elevated blood pressure reading in office with diagnosis of hypertension  Not at goal   - CBC with platelets; Future  - Basic metabolic panel  (Ca, Cl, CO2, Creat, Gluc, K, Na, BUN); Future  - CBC with platelets  - Basic metabolic panel  (Ca, Cl, CO2, Creat, Gluc, K, Na, BUN)    Hyperlipidemia LDL goal <130  due  - Lipid panel reflex to direct LDL Non-fasting; Future  - Lipid panel reflex to direct LDL Non-fasting    Morbid Obesity  Not at goal     Chronic kidney disease, stage 2 (mild)  due    Insomnia, unspecified type  At goal on medication     Peripheral polyneuropathy  At goal on medication     Gout, history of   Labs due    BMI  Estimated body mass index is 35.35 kg/m  as calculated from the following:    Height as of this encounter: 1.511 m (4' 11.5\").    Weight as of this encounter: 80.7 kg (178 lb).   Weight management plan: Discussed healthy diet and exercise guidelines    Counseling  Appropriate preventive services were addressed with this patient via screening, questionnaire, or discussion as appropriate for fall prevention, nutrition, physical activity, Tobacco-use cessation, social engagement, weight loss and cognition.  Checklist reviewing preventive services available has been given to the patient.  Reviewed patient's diet, addressing concerns and/or questions.   Patient is at risk for social isolation and has been provided with information about the benefit of social connection.   The patient was instructed to see the dentist every 6 months.   Patient reported safety concerns were addressed today.The patient was provided with written information regarding signs of hearing loss.   Information on urinary incontinence and treatment options given to patient.         aPulo Solo is a 79 year " old, presenting for the following:    Wellness Visit        10/2/2024    10:33 AM   Additional Questions   Roomed by Keisha         10/2/2024   Declines Weight   Did patient decline having their weight taken? Yes                Health Care Directive  Patient does not have a Health Care Directive or Living Will: Discussed advance care planning with patient; however, patient declined at this time.    HPI  No c/o    Hypertension- blood pressure cuff at home usually lower- need readings    Lipids at goal on pravastatin  BMI- dieting; declines wt loss medication     Insomnia chronic, zolpidem helps    Peripheral neuropathy, idiopathic. Gabapentin helps a little    Gout -history, on maintenance with allopurinol 200 mg without breakthrough          10/2/2024   General Health   How would you rate your overall physical health? Good   Feel stress (tense, anxious, or unable to sleep) Not at all            10/2/2024   Nutrition   Diet: Regular (no restrictions)            10/2/2024   Exercise   Days per week of moderate/strenous exercise 6 days   Average minutes spent exercising at this level 40 min            10/2/2024   Social Factors   Frequency of gathering with friends or relatives Never   Worry food won't last until get money to buy more No   Food not last or not have enough money for food? No   Do you have housing? (Housing is defined as stable permanent housing and does not include staying ouside in a car, in a tent, in an abandoned building, in an overnight shelter, or couch-surfing.) Yes   Are you worried about losing your housing? No   Lack of transportation? No   Unable to get utilities (heat,electricity)? No      (!) SOCIAL CONNECTIONS CONCERN      10/2/2024   Fall Risk   Fallen 2 or more times in the past year? No   Trouble with walking or balance? Yes   Gait Speed Test (Document in seconds) 7   Gait Speed Test Interpretation Greater than 5.01 seconds - ABNORMAL             10/2/2024   Activities of Daily Living-  Home Safety   Needs help with the following daily activites None of the above   Safety concerns in the home Throw rugs in the hallway            10/2/2024   Dental   Dentist two times every year? (!) NO            10/2/2024   Hearing Screening   Hearing concerns? (!) I FEEL THAT PEOPLE ARE MUMBLING OR NOT SPEAKING CLEARLY.    (!) I NEED TO ASK PEOPLE TO SPEAK UP OR REPEAT THEMSELVES.    (!) IT'S HARD TO FOLLOW A CONVERSATION IN A NOISY RESTAURANT OR CROWDED ROOM.    (!) TROUBLE UNDERSTANDING SOFT OR WHISPERED SPEECH.    (!) TROUBLE UNDERSTANDING SPEECH ON THE TELEPHONE       Multiple values from one day are sorted in reverse-chronological order         10/2/2024   Driving Risk Screening   Patient/family members have concerns about driving No            10/2/2024   General Alertness/Fatigue Screening   Have you been more tired than usual lately? No            10/2/2024   Urinary Incontinence Screening   Bothered by leaking urine in past 6 months Yes            10/2/2024   TB Screening   Were you born outside of the [place]? No            Today's PHQ-2 Score:       10/2/2024    10:43 AM   PHQ-2 ( 1999 [employer])   PHQ-2 Score Incomplete           10/2/2024   Substance Use   Alcohol more than 3/day or more than 7/wk Not Applicable   Do you have a current opioid prescription? No   How severe/bad is pain from 1 to 10? 0/10 (No Pain)   Do you use any other substances recreationally? No        Social History     Tobacco Use    Smoking status: Never    Smokeless tobacco: Never   Vaping Use    Vaping status: Never Used   Substance Use Topics    Alcohol use: No    Drug use: No          Mammogram Screening - After age 74- determine frequency with patient based on health status, life expectancy and patient goals            Reviewed and updated as needed this visit by Provider     Meds  Problems               Lab work is in process  Labs reviewed in EPIC  Current providers sharing in care for this patient include:  Patient Care  "Team:  Pedro Mcclelland MD as PCP - General (Family Practice)  Pedro Mcclelland MD as Assigned PCP    The following health maintenance items are reviewed in Epic and correct as of today:  Health Maintenance   Topic Date Due    DEXA  Never done    URINE DRUG SCREEN  Never done    ZOSTER IMMUNIZATION (1 of 2) Never done    RSV VACCINE (1 - 1-dose 75+ series) Never done    COVID-19 Vaccine (4 - 2024-25 season) 09/01/2024    MEDICARE ANNUAL WELLNESS VISIT  10/02/2025    BMP  10/02/2025    LIPID  10/02/2025    ANNUAL REVIEW OF HM ORDERS  10/02/2025    FALL RISK ASSESSMENT  10/02/2025    ADVANCE CARE PLANNING  10/02/2029    DTAP/TDAP/TD IMMUNIZATION (2 - Td or Tdap) 06/30/2031    PHQ-2 (once per calendar year)  Completed    INFLUENZA VACCINE  Completed    Pneumococcal Vaccine: 65+ Years  Completed    HPV IMMUNIZATION  Aged Out    MENINGITIS IMMUNIZATION  Aged Out    RSV MONOCLONAL ANTIBODY  Aged Out         Review of Systems  Constitutional, neuro, ENT, endocrine, pulmonary, cardiac, gastrointestinal, genitourinary, musculoskeletal, integument and psychiatric systems are negative, except as otherwise noted.     Objective    Exam  BP (!) 152/86 (BP Location: Right arm, Patient Position: Sitting, Cuff Size: Adult Regular)   Pulse 56   Temp 98.6  F (37  C) (Temporal)   Resp 18   Ht 1.511 m (4' 11.5\")   Wt 80.7 kg (178 lb)   SpO2 99%   BMI 35.35 kg/m     Estimated body mass index is 35.35 kg/m  as calculated from the following:    Height as of this encounter: 1.511 m (4' 11.5\").    Weight as of this encounter: 80.7 kg (178 lb).    Physical Exam  GENERAL: alert and no distress  EYES: Eyes grossly normal to inspection, PERRL and conjunctivae and sclerae normal  HENT: ear canals and TM's normal, nose and mouth without ulcers or lesions  NECK: no adenopathy, no asymmetry, masses, or scars  RESP: lungs clear to auscultation - no rales, rhonchi or wheezes  CV: regular rate and rhythm, normal S1 S2, no S3 or S4, no " murmur, click or rub, no peripheral edema  ABDOMEN: soft, nontender, no hepatosplenomegaly, no masses and bowel sounds normal  MS: no gross musculoskeletal defects noted, no edema  SKIN: no suspicious lesions or rashes  NEURO: Normal strength and tone, mentation intact and speech normal  PSYCH: mentation appears normal, affect normal/bright        10/2/2024   Mini Cog   Clock Draw Score 2 Normal   3 Item Recall 3 objects recalled   Mini Cog Total Score 5                 Signed Electronically by: Pedro Mcclelland MD

## 2024-10-04 DIAGNOSIS — I10 ESSENTIAL (PRIMARY) HYPERTENSION: ICD-10-CM

## 2024-10-04 RX ORDER — METOPROLOL SUCCINATE 50 MG/1
50 TABLET, EXTENDED RELEASE ORAL 2 TIMES DAILY
Qty: 180 TABLET | Refills: 3 | Status: SHIPPED | OUTPATIENT
Start: 2024-10-04

## 2024-10-05 PROBLEM — N18.2 CHRONIC KIDNEY DISEASE, STAGE 2 (MILD): Status: RESOLVED | Noted: 2023-12-04 | Resolved: 2024-10-05

## 2024-10-05 NOTE — PATIENT INSTRUCTIONS
Continue current meds     Followup 12 months     No MyChart, contact with labs    Call with blood pressures/pulses in morning before any medication, then at least 2 hrs or more later x 3 days    Counseled to reduce simple carbs, processed foods. Declined wt loss medications due to cost.

## 2024-10-05 NOTE — RESULT ENCOUNTER NOTE
Please notify patient: needs nonfasting lab only appointment to redraw potassium; the rest of labs within normal limits.     Thanks Pedro Mcclelland MD

## 2024-10-09 ENCOUNTER — LAB (OUTPATIENT)
Dept: LAB | Facility: CLINIC | Age: 79
End: 2024-10-09
Payer: MEDICARE

## 2024-10-09 DIAGNOSIS — E87.5 HYPERKALEMIA: ICD-10-CM

## 2024-10-09 LAB — POTASSIUM SERPL-SCNC: 5.2 MMOL/L (ref 3.4–5.3)

## 2024-10-09 PROCEDURE — 84132 ASSAY OF SERUM POTASSIUM: CPT

## 2024-10-09 PROCEDURE — 36415 COLL VENOUS BLD VENIPUNCTURE: CPT

## 2024-10-09 NOTE — LETTER
October 17, 2024      Germania [address]  [address] [facility] [id number]        Dear ,    We are writing to inform you of your test results.    Your test results fall within the expected range(s) or remain unchanged from previous results.  Please continue with current treatment plan.    Resulted Orders   Potassium   Result Value Ref Range    Potassium 5.2 3.4 - 5.3 mmol/L       If you have any questions or concerns, please call the clinic at the number listed above.       Sincerely,      Pedro Mcclelland MD

## 2024-10-14 DIAGNOSIS — G47.00 INSOMNIA, UNSPECIFIED TYPE: ICD-10-CM

## 2024-10-15 RX ORDER — ZOLPIDEM TARTRATE 5 MG/1
5 TABLET ORAL
Qty: 90 TABLET | Refills: 0 | Status: SHIPPED | OUTPATIENT
Start: 2024-10-15

## 2024-11-22 DIAGNOSIS — G62.9 POLYNEUROPATHY, UNSPECIFIED: ICD-10-CM

## 2024-11-25 RX ORDER — GABAPENTIN 300 MG/1
600 CAPSULE ORAL 2 TIMES DAILY PRN
Qty: 360 CAPSULE | Refills: 3 | Status: SHIPPED | OUTPATIENT
Start: 2024-11-25

## 2024-12-08 ENCOUNTER — OFFICE VISIT (OUTPATIENT)
Dept: URGENT CARE | Facility: URGENT CARE | Age: 79
End: 2024-12-08
Payer: COMMERCIAL

## 2024-12-08 VITALS
RESPIRATION RATE: 16 BRPM | WEIGHT: 186 LBS | HEART RATE: 57 BPM | OXYGEN SATURATION: 100 % | BODY MASS INDEX: 36.94 KG/M2 | TEMPERATURE: 98.2 F | SYSTOLIC BLOOD PRESSURE: 153 MMHG | DIASTOLIC BLOOD PRESSURE: 69 MMHG

## 2024-12-08 DIAGNOSIS — R05.1 ACUTE COUGH: Primary | ICD-10-CM

## 2024-12-08 PROCEDURE — 99213 OFFICE O/P EST LOW 20 MIN: CPT

## 2024-12-08 ASSESSMENT — PAIN SCALES - GENERAL: PAINLEVEL_OUTOF10: NO PAIN (0)

## 2024-12-08 NOTE — PROGRESS NOTES
"Assessment & Plan     Acute cough  Patient presents with acute onset cough for the last 9 days.  It is actually improving and almost resolved.  She is intermittently using some cough syrup with codeine that her PCP prescribed.  She is vitally stable, nontoxic-appearing, 100% O2 saturation on room air.  She is breathing comfortably.  She has no shortness of breath with ambulation.  Her lung exam is normal.  She actually had intended to cancel this appointment but her sister-in-law encouraged her to go in.  She can continue to manage symptomatically, no indication for further workup or treatment at this time, discussed return precautions for any worsening symptoms.       Return if symptoms worsen or fail to improve.    Angel Ascencio MD  [facility]    Paulo Solo is a 79 year old female who presents to clinic today for the following health issues:  Chief Complaint   Patient presents with    Cough     Cough with phlegm x9 days, no fever, knee surgery on 1/6/2025    Medication Update     Taking vit d, mag, vit c, tumeric       She \"feels fine\"  Actually cancelled PCP appointment tomorrow  Sister in law told her to come in  Last Tuesday started coughing - got some cough syrup (robitussin with codeine) improved  Friday ran out and cough came back  Since over 1 week when she called they told her to come in  At first cough was yellowish now clear  No fevers/chills  No SOB  Still some cough but almost gone  Barely using cough syrup now  No asthma/COPD  No dyspnea with ambulation, doing stairs at home  Exposed to granddaughters who were sick        Objective    BP (!) 153/69   Pulse 57   Temp 98.2  F (36.8  C) (Oral)   Resp 16   Wt 84.4 kg (186 lb)   SpO2 100%   BMI 36.94 kg/m    Physical Exam  Constitutional:       Appearance: Normal appearance. She is not toxic-appearing.   HENT:      Head: Normocephalic and atraumatic.      Right Ear: Tympanic membrane, ear canal and " external ear normal.      Left Ear: Tympanic membrane, ear canal and external ear normal.      Nose: Nose normal.      Mouth/Throat:      Mouth: Mucous membranes are moist.      Pharynx: Oropharynx is clear. No oropharyngeal exudate or posterior oropharyngeal erythema.   Eyes:      Conjunctiva/sclera: Conjunctivae normal.   Cardiovascular:      Rate and Rhythm: Normal rate and regular rhythm.      Heart sounds: Normal heart sounds.   Pulmonary:      Effort: Pulmonary effort is normal.      Breath sounds: Normal breath sounds.   Musculoskeletal:         General: Normal range of motion.      Cervical back: Neck supple.   Lymphadenopathy:      Cervical: No cervical adenopathy.   Skin:     General: Skin is warm and dry.   Neurological:      General: No focal deficit present.      Mental Status: She is alert.

## 2024-12-08 NOTE — PATIENT INSTRUCTIONS
For the cough - warm water with honey is very helpful. Minimize cough syrup with codeine.   No signs of bacterial infection - this was likely due to a virus.  Come back in if you are getting worse.

## 2024-12-23 DIAGNOSIS — I10 ESSENTIAL (PRIMARY) HYPERTENSION: ICD-10-CM

## 2024-12-23 DIAGNOSIS — N95.2 ATROPHIC VAGINITIS: ICD-10-CM

## 2024-12-24 RX ORDER — TRIAMCINOLONE ACETONIDE 1 MG/G
CREAM TOPICAL DAILY PRN
Qty: 30 G | Refills: 1 | Status: SHIPPED | OUTPATIENT
Start: 2024-12-24

## 2024-12-24 RX ORDER — LOSARTAN POTASSIUM 100 MG/1
100 TABLET ORAL DAILY
Qty: 90 TABLET | Refills: 3 | Status: SHIPPED | OUTPATIENT
Start: 2024-12-24

## 2024-12-27 ENCOUNTER — OFFICE VISIT (OUTPATIENT)
Dept: FAMILY MEDICINE | Facility: CLINIC | Age: 79
End: 2024-12-27
Payer: COMMERCIAL

## 2024-12-27 VITALS
WEIGHT: 181 LBS | TEMPERATURE: 98.7 F | DIASTOLIC BLOOD PRESSURE: 79 MMHG | HEART RATE: 59 BPM | HEIGHT: 60 IN | OXYGEN SATURATION: 97 % | SYSTOLIC BLOOD PRESSURE: 140 MMHG | BODY MASS INDEX: 35.53 KG/M2 | RESPIRATION RATE: 16 BRPM

## 2024-12-27 DIAGNOSIS — M17.11 PRIMARY OSTEOARTHRITIS OF RIGHT KNEE: ICD-10-CM

## 2024-12-27 DIAGNOSIS — Z01.818 PRE-OPERATIVE GENERAL PHYSICAL EXAMINATION: Primary | ICD-10-CM

## 2024-12-27 DIAGNOSIS — R94.31 NONSPECIFIC ABNORMAL ELECTROCARDIOGRAM (ECG) (EKG): ICD-10-CM

## 2024-12-27 DIAGNOSIS — I10 ESSENTIAL (PRIMARY) HYPERTENSION: ICD-10-CM

## 2024-12-27 DIAGNOSIS — M10.00 IDIOPATHIC GOUT, UNSPECIFIED CHRONICITY, UNSPECIFIED SITE: ICD-10-CM

## 2024-12-27 LAB
ANION GAP SERPL CALCULATED.3IONS-SCNC: 10 MMOL/L (ref 7–15)
BUN SERPL-MCNC: 20.9 MG/DL (ref 8–23)
CALCIUM SERPL-MCNC: 9.7 MG/DL (ref 8.8–10.4)
CHLORIDE SERPL-SCNC: 102 MMOL/L (ref 98–107)
CREAT SERPL-MCNC: 0.83 MG/DL (ref 0.51–0.95)
EGFRCR SERPLBLD CKD-EPI 2021: 70 ML/MIN/1.73M2
GLUCOSE SERPL-MCNC: 105 MG/DL (ref 70–99)
HCO3 SERPL-SCNC: 28 MMOL/L (ref 22–29)
POTASSIUM SERPL-SCNC: 4.9 MMOL/L (ref 3.4–5.3)
SODIUM SERPL-SCNC: 140 MMOL/L (ref 135–145)
URATE SERPL-MCNC: 5 MG/DL (ref 2.4–5.7)

## 2024-12-27 PROCEDURE — 84550 ASSAY OF BLOOD/URIC ACID: CPT | Performed by: FAMILY MEDICINE

## 2024-12-27 PROCEDURE — 99214 OFFICE O/P EST MOD 30 MIN: CPT | Performed by: FAMILY MEDICINE

## 2024-12-27 PROCEDURE — 80048 BASIC METABOLIC PNL TOTAL CA: CPT | Performed by: FAMILY MEDICINE

## 2024-12-27 PROCEDURE — 36415 COLL VENOUS BLD VENIPUNCTURE: CPT | Performed by: FAMILY MEDICINE

## 2024-12-27 RX ORDER — TRIAMTERENE AND HYDROCHLOROTHIAZIDE 37.5; 25 MG/1; MG/1
1 TABLET ORAL DAILY
Qty: 90 TABLET | Refills: 3 | Status: SHIPPED | OUTPATIENT
Start: 2024-12-27

## 2024-12-27 ASSESSMENT — PAIN SCALES - GENERAL: PAINLEVEL_OUTOF10: NO PAIN (0)

## 2024-12-27 NOTE — PROGRESS NOTES
Preoperative Evaluation  [facility]  [address]  [address]  [facility] [address]  Phone: [phone]  Fax: [phone]  Primary Provider: Pedro Mcclelland MD  Pre-op Performing Provider: Pedro Mcclelland MD  Dec 27, 2024             12/27/2024   Surgical Information   What procedure is being done? Total Right knee replacement   Facility or Hospital where procedure/surgery will be performed: [facility]   Who is doing the procedure / surgery? Dr. Gt Castillo   Date of surgery / procedure: 01/06/2025   Time of surgery / procedure: TBD   Where do you plan to recover after surgery? at home with family     Fax number for surgical facility: Note does not need to be faxed, will be available electronically in Epic.    Assessment & Plan     The proposed surgical procedure is considered INTERMEDIATE risk.    Pre-operative general physical examination  Cleared     Primary osteoarthritis of right knee  Clear for TKA    Nonspecific abnormal electrocardiogram (ECG) (EKG)  Today normal stress echo resulted  - Echo Stress Echocardiogram; Future    Essential (primary) hypertension  At goal   - triamterene-HCTZ (MAXZIDE-25) 37.5-25 MG tablet; Take 1 tablet by mouth daily.  - Basic metabolic panel  (Ca, Cl, CO2, Creat, Gluc, K, Na, BUN); Future  - Basic metabolic panel  (Ca, Cl, CO2, Creat, Gluc, K, Na, BUN)    Idiopathic gout, unspecified chronicity, unspecified site  due  - Uric acid; Future  - Uric acid      Possible Sleep Apnea: possible       12/30/2024    12:54 PM   STOP-Bang Total Score   Total Score 3   Risk Stratification 3 - 4: Moderate Risk for JACKLYN          Risks and Recommendations  The patient has the following additional risks and recommendations for perioperative complications:   - No identified additional risk factors other than previously addressed    Antiplatelet or Anticoagulation Medication Instructions   - aspirin: Discontinue aspirin 7 days prior to  procedure to reduce bleeding risk. It should be resumed postoperatively.     Additional Medication Instructions  Take dyazide, metoprolol, losartan with sip of water early am before surgery; hold all other medications until after    Recommendation  Approval given to proceed with proposed procedure, without further diagnostic evaluation.    Paulo Solo is a 79 year old, presenting for the following:  Pre-Op Exam          12/27/2024     8:42 AM   Additional Questions   Roomed by Alda   Accompanied by Self     HPI related to upcoming procedure: tried all conservative measures        12/27/2024   Pre-Op Questionnaire   Have you ever had a heart attack or stroke? No   Have you ever had surgery on your heart or blood vessels, such as a stent placement, a coronary artery bypass, or surgery on an artery in your head, neck, heart, or legs? No   Do you have chest pain with activity? No   Do you have a history of heart failure? No   Do you currently have a cold, bronchitis or symptoms of other infection? No   Do you have a cough, shortness of breath, or wheezing? No   Do you or anyone in your family have previous history of blood clots? (!) YES    Do you or does anyone in your family have a serious bleeding problem such as prolonged bleeding following surgeries or cuts? No   Have you ever had problems with anemia or been told to take iron pills? No   Have you had any abnormal blood loss such as black, tarry or bloody stools, or abnormal vaginal bleeding? No   Have you ever had a blood transfusion? No   Are you willing to have a blood transfusion if it is medically needed before, during, or after your surgery? Yes   Have you or any of your relatives ever had problems with anesthesia? No   Do you have sleep apnea, excessive snoring or daytime drowsiness? (!) YES   Do you have a CPAP machine? (!) NO    Do you have any artifical heart valves or other implanted medical devices like a pacemaker, defibrillator, or  continuous glucose monitor? No   Do you have artificial joints? (!) YES   Are you allergic to latex? No     Health Care Directive  Patient does not have a Health Care Directive: Discussed advance care planning with patient; however, patient declined at this time.    Preoperative Review of    reviewed - no record of controlled substances prescribed.    Status of Chronic Conditions:  HYPERLIPIDEMIA - Patient has a long history of significant Hyperlipidemia requiring medication for treatment with recent good control. Patient reports no problems or side effects with the medication.     HYPERTENSION - Patient has longstanding history of HTN , currently denies any symptoms referable to elevated blood pressure. Specifically denies chest pain, palpitations, dyspnea, orthopnea, PND or peripheral edema. Blood pressure readings have been in normal range. Current medication regimen is as listed below. Patient denies any side effects of medication.     Patient Active Problem List    Diagnosis Date Noted    S/P total knee arthroplasty, left 04/15/2024     Priority: Medium    Wears hearing aid in both ears 09/29/2023     Priority: Medium    Idiopathic gout, unspecified chronicity, unspecified site 03/31/2022     Priority: Medium    Lymphedema of both lower extremities 07/01/2021     Priority: Medium    Status post total hip replacement, right 08/13/2019     Priority: Medium    Tired 12/16/2018     Priority: Medium    Peripheral polyneuropathy 04/02/2018     Priority: Medium    BMI 40.0-44.9, adult (H) 11/13/2017     Priority: Medium    Essential hypertension with goal blood pressure less than 140/90 11/11/2016     Priority: Medium    Insomnia, unspecified type 08/01/2016     Priority: Medium     #20 Rx 4 x yearly      Decreased hearing, bilateral 06/20/2016     Priority: Medium    Gastroesophageal reflux disease, esophagitis presence not specified 06/20/2016     Priority: Medium     IMO Regulatory Load OCT 2020      Family  history of thyroid disease 04/25/2014     Priority: Medium    Nonspecific abnormal electrocardiogram (ECG) (EKG) 08/29/2013     Priority: Medium    Hyperlipidemia LDL goal <130 08/14/2013     Priority: Medium    Primary osteoarthritis of left knee      Priority: Medium      Past Medical History:   Diagnosis Date    BMI 40.0-44.9, adult (H) 11/13/2017    Chronic kidney disease, stage 2 (mild)     Esophageal reflux     Gastroesophageal reflux    Essential hypertension with goal blood pressure less than 140/90     GERD (gastroesophageal reflux disease)     Hyperlipidemia LDL goal <130     Idiopathic gout     Insomnia     Left knee pain     Lymphedema of both lower extremities     Nonspecific abnormal electrocardiogram (ECG) (EKG) 08/29/2013    Peripheral polyneuropathy     PONV (postoperative nausea and vomiting)     Primary osteoarthritis of left knee     Wears hearing aid in both ears 09/29/2023     Past Surgical History:   Procedure Laterality Date    ARTHROPLASTY HIP ANTERIOR Right 8/13/2019    Procedure: RIGHT DIRECT ANTERIOR TOTAL HIP ARTHROPLASTY;  Surgeon: Gt Castillo MD;  Location:  OR    ARTHROPLASTY KNEE Left 4/15/2024    Procedure: left total knee arthroplasty;  Surgeon: Gt Castillo MD;  Location:  OR    CHOLECYSTECTOMY      HERNIORRHAPHY VENTRAL  4/12/2012    Procedure:HERNIORRHAPHY VENTRAL; ventral hernia repair with mesh; Surgeon:ELOISA INMAN; Location: [facility]    HYSTERECTOMY  5/24/01    uterine prolapse/ant. repair     Current Outpatient Medications   Medication Sig Dispense Refill    acetaminophen (TYLENOL) 325 MG tablet Take 2 tablets (650 mg) by mouth every 4 hours as needed for other (mild pain) 100 tablet 0    allopurinol (ZYLOPRIM) 100 MG tablet Take 2 tablets (200 mg) by mouth daily 180 tablet 3    aspirin (ASA) 325 MG EC tablet Take 325 mg by mouth daily at 2 pm      cyclobenzaprine (FLEXERIL) 10 MG tablet TAKE 1/2 TO 1 TABLET (5-10 MG) BY MOUTH 3 TIMES DAILY AS NEEDED FOR  "MUSCLE SPASMS 90 tablet 3    gabapentin (NEURONTIN) 300 MG capsule TAKE 2 CAPSULES (600 MG) BY MOUTH 2 TIMES DAILY AS NEEDED FOR NEUROPATHIC PAIN 360 capsule 3    guaiFENesin-codeine (ROBITUSSIN AC) 100-10 MG/5ML solution Take 10 mLs by mouth every 6 hours as needed for cough. 120 mL 0    KLOR-CON M10 10 MEQ CR tablet TAKE 1 TABLET BY MOUTH EVERY DAY 90 tablet 3    losartan (COZAAR) 100 MG tablet TAKE 1 TABLET BY MOUTH EVERY DAY 90 tablet 3    metoprolol succinate ER (TOPROL XL) 50 MG 24 hr tablet TAKE 1 TABLET BY MOUTH TWICE A  tablet 3    multivitamin (CENTRUM SILVER) tablet Take 1 tablet by mouth daily      pravastatin (PRAVACHOL) 20 MG tablet Take 1 tablet (20 mg) by mouth daily 90 tablet 3    senna-docusate (SENOKOT-S/PERICOLACE) 8.6-50 MG tablet Take 1-2 tablets by mouth 2 times daily Take while on oral narcotics to prevent or treat constipation. 30 tablet 0    triamcinolone (KENALOG) 0.1 % external cream APPLY TOPICALLY DAILY AS NEEDED FOR IRRITATION 30 g 1    triamterene-HCTZ (MAXZIDE-25) 37.5-25 MG tablet Take 0.5 tablets by mouth daily      zolpidem (AMBIEN) 5 MG tablet TAKE 1 TABLET (5 MG) BY MOUTH NIGHTLY AS NEEDED FOR SLEEP 90 tablet 0       No Known Allergies     Social History     Tobacco Use    Smoking status: Never    Smokeless tobacco: Never   Substance Use Topics    Alcohol use: No     History   Drug Use No             Review of Systems  Constitutional, neuro, ENT, endocrine, pulmonary, cardiac, gastrointestinal, genitourinary, musculoskeletal, integument and psychiatric systems are negative, except as otherwise noted.    Objective    BP (!) 140/79   Pulse 59   Temp 98.7  F (37.1  C) (Temporal)   Resp 16   Ht 1.511 m (4' 11.5\")   Wt 82.1 kg (181 lb)   SpO2 97%   BMI 35.95 kg/m     Estimated body mass index is 35.95 kg/m  as calculated from the following:    Height as of this encounter: 1.511 m (4' 11.5\").    Weight as of this encounter: 82.1 kg (181 lb).  Physical Exam  GENERAL: " alert and no distress  EYES: Eyes grossly normal to inspection, PERRL and conjunctivae and sclerae normal  HENT: ear canals and TM's normal, nose and mouth without ulcers or lesions  NECK: no adenopathy, no asymmetry, masses, or scars  RESP: lungs clear to auscultation - no rales, rhonchi or wheezes  CV: regular rate and rhythm, normal S1 S2, no S3 or S4, no murmur, click or rub, no peripheral edema  ABDOMEN: soft, nontender, no hepatosplenomegaly, no masses and bowel sounds normal  MS: no gross musculoskeletal defects noted, no edema  SKIN: no suspicious lesions or rashes  NEURO: Normal strength and tone, mentation intact and speech normal  PSYCH: mentation appears normal, affect normal/bright       Results for orders placed or performed in visit on 12/27/24   Uric acid     Status: Normal   Result Value Ref Range    Uric Acid 5.0 2.4 - 5.7 mg/dL   Basic metabolic panel  (Ca, Cl, CO2, Creat, Gluc, K, Na, BUN)     Status: Abnormal   Result Value Ref Range    Sodium 140 135 - 145 mmol/L    Potassium 4.9 3.4 - 5.3 mmol/L    Chloride 102 98 - 107 mmol/L    Carbon Dioxide (CO2) 28 22 - 29 mmol/L    Anion Gap 10 7 - 15 mmol/L    Urea Nitrogen 20.9 8.0 - 23.0 mg/dL    Creatinine 0.83 0.51 - 0.95 mg/dL    GFR Estimate 70 >60 mL/min/1.73m2    Calcium 9.7 8.8 - 10.4 mg/dL    Glucose 105 (H) 70 - 99 mg/dL        Diagnostics     EKG required for age and not completed in the last 90 days.   Nonspecific T changes- dobutamine stress echo completed today and result are normal     Revised Cardiac Risk Index (RCRI)  The patient has the following serious cardiovascular risks for perioperative complications:   - No serious cardiac risks = 0 points     RCRI Interpretation: 1 point: Class II (low risk - 0.9% complication rate)         Signed Electronically by: Pedro Mcclelland MD  A copy of this evaluation report is provided to the requesting physician.

## 2024-12-27 NOTE — PATIENT INSTRUCTIONS
Take blood pressure medications early the morning before surgery, take all other medication after surgery    Will schedule appointment for stress echo at [facility].    Before stress test don't take metoprolol (OK to take all the rest of medication )

## 2024-12-30 ENCOUNTER — HOSPITAL ENCOUNTER (OUTPATIENT)
Dept: CARDIOLOGY | Facility: CLINIC | Age: 79
Discharge: HOME | End: 2024-12-30
Attending: FAMILY MEDICINE | Admitting: FAMILY MEDICINE
Payer: MEDICARE

## 2024-12-30 DIAGNOSIS — R94.31 NONSPECIFIC ABNORMAL ELECTROCARDIOGRAM (ECG) (EKG): ICD-10-CM

## 2024-12-30 LAB
CV STRESS CURRENT BP HE: NORMAL
CV STRESS CURRENT HR HE: 100
CV STRESS CURRENT HR HE: 104
CV STRESS CURRENT HR HE: 106
CV STRESS CURRENT HR HE: 110
CV STRESS CURRENT HR HE: 111
CV STRESS CURRENT HR HE: 113
CV STRESS CURRENT HR HE: 115
CV STRESS CURRENT HR HE: 118
CV STRESS CURRENT HR HE: 118
CV STRESS CURRENT HR HE: 120
CV STRESS CURRENT HR HE: 123
CV STRESS CURRENT HR HE: 71
CV STRESS CURRENT HR HE: 72
CV STRESS CURRENT HR HE: 72
CV STRESS CURRENT HR HE: 73
CV STRESS CURRENT HR HE: 75
CV STRESS CURRENT HR HE: 80
CV STRESS CURRENT HR HE: 86
CV STRESS CURRENT HR HE: 90
CV STRESS CURRENT HR HE: 91
CV STRESS CURRENT HR HE: 91
CV STRESS CURRENT HR HE: 96
CV STRESS CURRENT HR HE: 97
CV STRESS CURRENT HR HE: 99
CV STRESS DEVIATION TIME HE: NORMAL
CV STRESS ECHO PERCENT HR HE: NORMAL
CV STRESS EXERCISE STAGE HE: NORMAL
CV STRESS EXERCISE STAGE REACHED HE: NORMAL
CV STRESS FINAL RESTING BP HE: NORMAL
CV STRESS FINAL RESTING HR HE: 90
CV STRESS MAX HR HE: 123
CV STRESS MAX TREADMILL GRADE HE: 0
CV STRESS MAX TREADMILL SPEED HE: 0
CV STRESS PEAK DIA BP HE: NORMAL
CV STRESS PEAK SYS BP HE: NORMAL
CV STRESS PHASE HE: NORMAL
CV STRESS PROTOCOL HE: NORMAL
CV STRESS REASON STOPPED HE: NORMAL
CV STRESS RESTING PT POSITION HE: NORMAL
CV STRESS RESTING PT POSITION HE: NORMAL
CV STRESS ST DEVIATION AMOUNT HE: NORMAL
CV STRESS ST DEVIATION ELEVATION HE: NORMAL
CV STRESS ST EVELATION AMOUNT HE: NORMAL
CV STRESS SYMPTOMS HE: NORMAL
CV STRESS TEST TYPE HE: NORMAL
CV STRESS TOTAL STAGE TIME MIN 1 HE: NORMAL
STRESS ECHO BASELINE DIASTOLIC HE: 72
STRESS ECHO BASELINE HR: 75
STRESS ECHO BASELINE SYSTOLIC BP: 167
STRESS ECHO LAST STRESS DIASTOLIC BP: 43
STRESS ECHO LAST STRESS HR: 123
STRESS ECHO LAST STRESS SYSTOLIC BP: 120
STRESS ECHO POST ESTIMATED WORKLOAD: 1
STRESS ECHO POST EXERCISE DUR MIN: 9
STRESS ECHO POST EXERCISE DUR SEC: 24
STRESS ECHO TARGET HR: 118

## 2024-12-30 PROCEDURE — 255N000002 HC RX 255 OP 636: Performed by: INTERNAL MEDICINE

## 2024-12-30 PROCEDURE — 93325 DOPPLER ECHO COLOR FLOW MAPG: CPT | Mod: TC

## 2024-12-30 PROCEDURE — C8928 TTE W OR W/O FOL W/CON,STRES: HCPCS

## 2024-12-30 PROCEDURE — 250N000011 HC RX IP 250 OP 636: Performed by: INTERNAL MEDICINE

## 2024-12-30 PROCEDURE — 250N000009 HC RX 250: Performed by: INTERNAL MEDICINE

## 2024-12-30 RX ORDER — LIDOCAINE 40 MG/G
CREAM TOPICAL
Status: DISCONTINUED | OUTPATIENT
Start: 2024-12-30 | End: 2024-12-31 | Stop reason: HOSPADM

## 2024-12-30 RX ORDER — DOBUTAMINE HYDROCHLORIDE 200 MG/100ML
10-50 INJECTION INTRAVENOUS CONTINUOUS
Status: ACTIVE | OUTPATIENT
Start: 2024-12-30 | End: 2024-12-30

## 2024-12-30 RX ORDER — METOPROLOL TARTRATE 1 MG/ML
1-5 INJECTION, SOLUTION INTRAVENOUS
Status: ACTIVE | OUTPATIENT
Start: 2024-12-30 | End: 2024-12-30

## 2024-12-30 RX ORDER — ATROPINE SULFATE 0.4 MG/ML
.2-.4 AMPUL (ML) INJECTION
Status: DISCONTINUED | OUTPATIENT
Start: 2024-12-30 | End: 2024-12-31 | Stop reason: HOSPADM

## 2024-12-30 RX ADMIN — PERFLUTREN 5 ML: 6.52 INJECTION, SUSPENSION INTRAVENOUS at 11:47

## 2024-12-30 RX ADMIN — METOPROLOL TARTRATE 1 MG: 5 INJECTION INTRAVENOUS at 11:44

## 2024-12-30 RX ADMIN — DOBUTAMINE HYDROCHLORIDE 10 MCG/KG/MIN: 200 INJECTION INTRAVENOUS at 11:33

## 2024-12-30 NOTE — PROGRESS NOTES
Pt here for dobutamine stress test. Test, meds and side effects reviewed with patient. Achieved target HR at 30 mcg Dobutamine and given a total of 1 mg IV Metoprolol to bring HR back to baseline. Post monitoring complete and VSS. Pt escorted out to the gold waiting room.

## 2024-12-31 ENCOUNTER — TRANSFERRED RECORDS (OUTPATIENT)
Dept: HEALTH INFORMATION MANAGEMENT | Facility: CLINIC | Age: 79
End: 2024-12-31
Payer: MEDICARE

## 2024-12-31 NOTE — RESULT ENCOUNTER NOTE
Normal results, called patient- no answer. Left message, OK for surgery. Contact if questions    Pedro Mcclelland MD

## 2025-01-06 ENCOUNTER — ANESTHESIA EVENT (OUTPATIENT)
Dept: SURGERY | Facility: CLINIC | Age: 80
End: 2025-01-06
Payer: MEDICARE

## 2025-01-06 ENCOUNTER — HOSPITAL ENCOUNTER (OUTPATIENT)
Facility: CLINIC | Age: 80
Discharge: HOME | End: 2025-01-07
Attending: ORTHOPAEDIC SURGERY | Admitting: ORTHOPAEDIC SURGERY
Payer: MEDICARE

## 2025-01-06 ENCOUNTER — APPOINTMENT (OUTPATIENT)
Dept: GENERAL RADIOLOGY | Facility: CLINIC | Age: 80
End: 2025-01-06
Attending: ORTHOPAEDIC SURGERY
Payer: MEDICARE

## 2025-01-06 ENCOUNTER — ANESTHESIA (OUTPATIENT)
Dept: SURGERY | Facility: CLINIC | Age: 80
End: 2025-01-06
Payer: MEDICARE

## 2025-01-06 DIAGNOSIS — R79.89 ELEVATED SERUM CREATININE: ICD-10-CM

## 2025-01-06 DIAGNOSIS — Z96.651 STATUS POST TOTAL RIGHT KNEE REPLACEMENT: Primary | ICD-10-CM

## 2025-01-06 DIAGNOSIS — I10 ESSENTIAL (PRIMARY) HYPERTENSION: ICD-10-CM

## 2025-01-06 DIAGNOSIS — I10 ESSENTIAL HYPERTENSION WITH GOAL BLOOD PRESSURE LESS THAN 140/90: ICD-10-CM

## 2025-01-06 LAB
FASTING STATUS PATIENT QL REPORTED: YES
GLUCOSE SERPL-MCNC: 101 MG/DL (ref 70–99)
HGB BLD-MCNC: 12.7 G/DL (ref 11.7–15.7)
POTASSIUM SERPL-SCNC: 5.1 MMOL/L (ref 3.4–5.3)

## 2025-01-06 PROCEDURE — 250N000009 HC RX 250: Performed by: ORTHOPAEDIC SURGERY

## 2025-01-06 PROCEDURE — 250N000009 HC RX 250

## 2025-01-06 PROCEDURE — 36415 COLL VENOUS BLD VENIPUNCTURE: CPT | Performed by: ANESTHESIOLOGY

## 2025-01-06 PROCEDURE — 999N000141 HC STATISTIC PRE-PROCEDURE NURSING ASSESSMENT: Performed by: ORTHOPAEDIC SURGERY

## 2025-01-06 PROCEDURE — 250N000013 HC RX MED GY IP 250 OP 250 PS 637: Performed by: INTERNAL MEDICINE

## 2025-01-06 PROCEDURE — 360N000077 HC SURGERY LEVEL 4, PER MIN: Performed by: ORTHOPAEDIC SURGERY

## 2025-01-06 PROCEDURE — 272N000001 HC OR GENERAL SUPPLY STERILE: Performed by: ORTHOPAEDIC SURGERY

## 2025-01-06 PROCEDURE — 370N000017 HC ANESTHESIA TECHNICAL FEE, PER MIN: Performed by: ORTHOPAEDIC SURGERY

## 2025-01-06 PROCEDURE — 258N000001 HC RX 258: Performed by: ORTHOPAEDIC SURGERY

## 2025-01-06 PROCEDURE — 250N000011 HC RX IP 250 OP 636: Performed by: ANESTHESIOLOGY

## 2025-01-06 PROCEDURE — 250N000013 HC RX MED GY IP 250 OP 250 PS 637: Performed by: ORTHOPAEDIC SURGERY

## 2025-01-06 PROCEDURE — 64447 NJX AA&/STRD FEMORAL NRV IMG: CPT | Mod: XU,RT

## 2025-01-06 PROCEDURE — C1776 JOINT DEVICE (IMPLANTABLE): HCPCS | Performed by: ORTHOPAEDIC SURGERY

## 2025-01-06 PROCEDURE — 258N000003 HC RX IP 258 OP 636: Performed by: ORTHOPAEDIC SURGERY

## 2025-01-06 PROCEDURE — 258N000003 HC RX IP 258 OP 636

## 2025-01-06 PROCEDURE — 97116 GAIT TRAINING THERAPY: CPT | Mod: GP

## 2025-01-06 PROCEDURE — 250N000011 HC RX IP 250 OP 636

## 2025-01-06 PROCEDURE — 250N000011 HC RX IP 250 OP 636: Performed by: ORTHOPAEDIC SURGERY

## 2025-01-06 PROCEDURE — 82947 ASSAY GLUCOSE BLOOD QUANT: CPT | Performed by: ANESTHESIOLOGY

## 2025-01-06 PROCEDURE — 710N000009 HC RECOVERY PHASE 1, LEVEL 1, PER MIN: Performed by: ORTHOPAEDIC SURGERY

## 2025-01-06 PROCEDURE — 97530 THERAPEUTIC ACTIVITIES: CPT | Mod: GP

## 2025-01-06 PROCEDURE — 97161 PT EVAL LOW COMPLEX 20 MIN: CPT | Mod: GP

## 2025-01-06 PROCEDURE — 85018 HEMOGLOBIN: CPT | Performed by: ANESTHESIOLOGY

## 2025-01-06 PROCEDURE — 84132 ASSAY OF SERUM POTASSIUM: CPT | Performed by: ANESTHESIOLOGY

## 2025-01-06 PROCEDURE — 999N000065 XR KNEE PORT RIGHT 1/2 VIEWS: Mod: RT

## 2025-01-06 PROCEDURE — 258N000003 HC RX IP 258 OP 636: Performed by: ANESTHESIOLOGY

## 2025-01-06 PROCEDURE — 250N000011 HC RX IP 250 OP 636: Mod: JZ | Performed by: ORTHOPAEDIC SURGERY

## 2025-01-06 PROCEDURE — 99215 OFFICE O/P EST HI 40 MIN: CPT | Performed by: INTERNAL MEDICINE

## 2025-01-06 DEVICE — IMPLANTABLE DEVICE
Type: IMPLANTABLE DEVICE | Site: KNEE | Status: FUNCTIONAL
Brand: PERSONA® THE PERSONALIZED KNEE® OSSEOTI®

## 2025-01-06 DEVICE — IMPLANTABLE DEVICE
Type: IMPLANTABLE DEVICE | Site: KNEE | Status: FUNCTIONAL
Brand: PERSONA® VIVACIT-E®

## 2025-01-06 RX ORDER — AMOXICILLIN 250 MG
1-2 CAPSULE ORAL 2 TIMES DAILY
Qty: 30 TABLET | Refills: 0 | Status: SHIPPED | OUTPATIENT
Start: 2025-01-06

## 2025-01-06 RX ORDER — ASPIRIN 325 MG
325 TABLET, DELAYED RELEASE (ENTERIC COATED) ORAL DAILY
Qty: 30 TABLET | Refills: 0 | Status: SHIPPED | OUTPATIENT
Start: 2025-01-06

## 2025-01-06 RX ORDER — TRIAMTERENE AND HYDROCHLOROTHIAZIDE 37.5; 25 MG/1; MG/1
1 TABLET ORAL DAILY
Status: DISCONTINUED | OUTPATIENT
Start: 2025-01-06 | End: 2025-01-07 | Stop reason: HOSPADM

## 2025-01-06 RX ORDER — METHOCARBAMOL 500 MG/1
250 TABLET ORAL EVERY 6 HOURS PRN
Status: DISCONTINUED | OUTPATIENT
Start: 2025-01-06 | End: 2025-01-07 | Stop reason: HOSPADM

## 2025-01-06 RX ORDER — HYDRALAZINE HYDROCHLORIDE 20 MG/ML
2.5-5 INJECTION INTRAMUSCULAR; INTRAVENOUS EVERY 10 MIN PRN
Status: DISCONTINUED | OUTPATIENT
Start: 2025-01-06 | End: 2025-01-06 | Stop reason: HOSPADM

## 2025-01-06 RX ORDER — OXYCODONE HYDROCHLORIDE 5 MG/1
5 TABLET ORAL EVERY 4 HOURS PRN
Status: DISCONTINUED | OUTPATIENT
Start: 2025-01-06 | End: 2025-01-07 | Stop reason: HOSPADM

## 2025-01-06 RX ORDER — ACETAMINOPHEN 325 MG/1
975 TABLET ORAL EVERY 8 HOURS
Status: DISCONTINUED | OUTPATIENT
Start: 2025-01-06 | End: 2025-01-07 | Stop reason: HOSPADM

## 2025-01-06 RX ORDER — OXYCODONE HYDROCHLORIDE 5 MG/1
5-10 TABLET ORAL EVERY 4 HOURS PRN
Qty: 30 TABLET | Refills: 0 | Status: SHIPPED | OUTPATIENT
Start: 2025-01-06

## 2025-01-06 RX ORDER — ONDANSETRON 2 MG/ML
4 INJECTION INTRAMUSCULAR; INTRAVENOUS EVERY 6 HOURS PRN
Status: DISCONTINUED | OUTPATIENT
Start: 2025-01-06 | End: 2025-01-07 | Stop reason: HOSPADM

## 2025-01-06 RX ORDER — PROCHLORPERAZINE MALEATE 5 MG/1
5 TABLET ORAL EVERY 6 HOURS PRN
Status: DISCONTINUED | OUTPATIENT
Start: 2025-01-06 | End: 2025-01-07 | Stop reason: HOSPADM

## 2025-01-06 RX ORDER — DEXAMETHASONE SODIUM PHOSPHATE 4 MG/ML
INJECTION, SOLUTION INTRA-ARTICULAR; INTRALESIONAL; INTRAMUSCULAR; INTRAVENOUS; SOFT TISSUE PRN
Status: DISCONTINUED | OUTPATIENT
Start: 2025-01-06 | End: 2025-01-06

## 2025-01-06 RX ORDER — SODIUM CHLORIDE, SODIUM LACTATE, POTASSIUM CHLORIDE, CALCIUM CHLORIDE 600; 310; 30; 20 MG/100ML; MG/100ML; MG/100ML; MG/100ML
INJECTION, SOLUTION INTRAVENOUS CONTINUOUS
Status: DISCONTINUED | OUTPATIENT
Start: 2025-01-06 | End: 2025-01-06 | Stop reason: HOSPADM

## 2025-01-06 RX ORDER — BISACODYL 10 MG
10 SUPPOSITORY, RECTAL RECTAL DAILY PRN
Status: DISCONTINUED | OUTPATIENT
Start: 2025-01-09 | End: 2025-01-07 | Stop reason: HOSPADM

## 2025-01-06 RX ORDER — FENTANYL CITRATE 0.05 MG/ML
50 INJECTION, SOLUTION INTRAMUSCULAR; INTRAVENOUS
Status: COMPLETED | OUTPATIENT
Start: 2025-01-06 | End: 2025-01-06

## 2025-01-06 RX ORDER — ASPIRIN 325 MG
325 TABLET, DELAYED RELEASE (ENTERIC COATED) ORAL DAILY
Status: DISCONTINUED | OUTPATIENT
Start: 2025-01-06 | End: 2025-01-07 | Stop reason: HOSPADM

## 2025-01-06 RX ORDER — LIDOCAINE HYDROCHLORIDE 20 MG/ML
INJECTION, SOLUTION INFILTRATION; PERINEURAL PRN
Status: DISCONTINUED | OUTPATIENT
Start: 2025-01-06 | End: 2025-01-06

## 2025-01-06 RX ORDER — NALOXONE HYDROCHLORIDE 0.4 MG/ML
0.4 INJECTION, SOLUTION INTRAMUSCULAR; INTRAVENOUS; SUBCUTANEOUS
Status: DISCONTINUED | OUTPATIENT
Start: 2025-01-06 | End: 2025-01-07 | Stop reason: HOSPADM

## 2025-01-06 RX ORDER — PRAVASTATIN SODIUM 20 MG
20 TABLET ORAL DAILY
Status: DISCONTINUED | OUTPATIENT
Start: 2025-01-07 | End: 2025-01-07 | Stop reason: HOSPADM

## 2025-01-06 RX ORDER — ONDANSETRON 4 MG/1
4 TABLET, ORALLY DISINTEGRATING ORAL EVERY 30 MIN PRN
Status: DISCONTINUED | OUTPATIENT
Start: 2025-01-06 | End: 2025-01-06 | Stop reason: HOSPADM

## 2025-01-06 RX ORDER — NALOXONE HYDROCHLORIDE 0.4 MG/ML
0.2 INJECTION, SOLUTION INTRAMUSCULAR; INTRAVENOUS; SUBCUTANEOUS
Status: DISCONTINUED | OUTPATIENT
Start: 2025-01-06 | End: 2025-01-07 | Stop reason: HOSPADM

## 2025-01-06 RX ORDER — NALOXONE HYDROCHLORIDE 0.4 MG/ML
0.1 INJECTION, SOLUTION INTRAMUSCULAR; INTRAVENOUS; SUBCUTANEOUS
Status: DISCONTINUED | OUTPATIENT
Start: 2025-01-06 | End: 2025-01-06 | Stop reason: HOSPADM

## 2025-01-06 RX ORDER — LIDOCAINE 40 MG/G
CREAM TOPICAL
Status: DISCONTINUED | OUTPATIENT
Start: 2025-01-06 | End: 2025-01-06 | Stop reason: HOSPADM

## 2025-01-06 RX ORDER — PROPOFOL 10 MG/ML
INJECTION, EMULSION INTRAVENOUS CONTINUOUS PRN
Status: DISCONTINUED | OUTPATIENT
Start: 2025-01-06 | End: 2025-01-06

## 2025-01-06 RX ORDER — ONDANSETRON 2 MG/ML
INJECTION INTRAMUSCULAR; INTRAVENOUS PRN
Status: DISCONTINUED | OUTPATIENT
Start: 2025-01-06 | End: 2025-01-06

## 2025-01-06 RX ORDER — AMOXICILLIN 250 MG
1-2 CAPSULE ORAL 2 TIMES DAILY
Status: DISCONTINUED | OUTPATIENT
Start: 2025-01-06 | End: 2025-01-07 | Stop reason: HOSPADM

## 2025-01-06 RX ORDER — LOSARTAN POTASSIUM 50 MG/1
50 TABLET ORAL DAILY
Status: DISCONTINUED | OUTPATIENT
Start: 2025-01-07 | End: 2025-01-07

## 2025-01-06 RX ORDER — HYDROMORPHONE HCL IN WATER/PF 6 MG/30 ML
0.4 PATIENT CONTROLLED ANALGESIA SYRINGE INTRAVENOUS EVERY 5 MIN PRN
Status: DISCONTINUED | OUTPATIENT
Start: 2025-01-06 | End: 2025-01-06 | Stop reason: HOSPADM

## 2025-01-06 RX ORDER — MAGNESIUM HYDROXIDE 1200 MG/15ML
LIQUID ORAL PRN
Status: DISCONTINUED | OUTPATIENT
Start: 2025-01-06 | End: 2025-01-06 | Stop reason: HOSPADM

## 2025-01-06 RX ORDER — HYDROMORPHONE HCL IN WATER/PF 6 MG/30 ML
0.4 PATIENT CONTROLLED ANALGESIA SYRINGE INTRAVENOUS
Status: DISCONTINUED | OUTPATIENT
Start: 2025-01-06 | End: 2025-01-07 | Stop reason: HOSPADM

## 2025-01-06 RX ORDER — AMOXICILLIN 250 MG
1 CAPSULE ORAL 2 TIMES DAILY
Status: DISCONTINUED | OUTPATIENT
Start: 2025-01-06 | End: 2025-01-06

## 2025-01-06 RX ORDER — DEXAMETHASONE SODIUM PHOSPHATE 4 MG/ML
4 INJECTION, SOLUTION INTRA-ARTICULAR; INTRALESIONAL; INTRAMUSCULAR; INTRAVENOUS; SOFT TISSUE
Status: DISCONTINUED | OUTPATIENT
Start: 2025-01-06 | End: 2025-01-06 | Stop reason: HOSPADM

## 2025-01-06 RX ORDER — LIDOCAINE 40 MG/G
CREAM TOPICAL
Status: DISCONTINUED | OUTPATIENT
Start: 2025-01-06 | End: 2025-01-07 | Stop reason: HOSPADM

## 2025-01-06 RX ORDER — DIPHENHYDRAMINE HCL 12.5MG/5ML
12.5 LIQUID (ML) ORAL EVERY 6 HOURS PRN
Status: DISCONTINUED | OUTPATIENT
Start: 2025-01-06 | End: 2025-01-07 | Stop reason: HOSPADM

## 2025-01-06 RX ORDER — LABETALOL HYDROCHLORIDE 5 MG/ML
10 INJECTION, SOLUTION INTRAVENOUS
Status: DISCONTINUED | OUTPATIENT
Start: 2025-01-06 | End: 2025-01-06 | Stop reason: HOSPADM

## 2025-01-06 RX ORDER — ACETAMINOPHEN 325 MG/1
650 TABLET ORAL EVERY 4 HOURS PRN
Qty: 100 TABLET | Refills: 0 | Status: SHIPPED | OUTPATIENT
Start: 2025-01-06

## 2025-01-06 RX ORDER — LOSARTAN POTASSIUM 100 MG/1
100 TABLET ORAL DAILY
Status: DISCONTINUED | OUTPATIENT
Start: 2025-01-06 | End: 2025-01-06

## 2025-01-06 RX ORDER — ONDANSETRON 2 MG/ML
4 INJECTION INTRAMUSCULAR; INTRAVENOUS EVERY 30 MIN PRN
Status: DISCONTINUED | OUTPATIENT
Start: 2025-01-06 | End: 2025-01-06 | Stop reason: HOSPADM

## 2025-01-06 RX ORDER — FENTANYL CITRATE 50 UG/ML
50 INJECTION, SOLUTION INTRAMUSCULAR; INTRAVENOUS EVERY 5 MIN PRN
Status: DISCONTINUED | OUTPATIENT
Start: 2025-01-06 | End: 2025-01-06 | Stop reason: HOSPADM

## 2025-01-06 RX ORDER — OXYCODONE HYDROCHLORIDE 5 MG/1
10 TABLET ORAL EVERY 4 HOURS PRN
Status: DISCONTINUED | OUTPATIENT
Start: 2025-01-06 | End: 2025-01-07 | Stop reason: HOSPADM

## 2025-01-06 RX ORDER — HYDROXYZINE HYDROCHLORIDE 10 MG/1
10 TABLET, FILM COATED ORAL EVERY 6 HOURS PRN
Status: DISCONTINUED | OUTPATIENT
Start: 2025-01-06 | End: 2025-01-07 | Stop reason: HOSPADM

## 2025-01-06 RX ORDER — POLYETHYLENE GLYCOL 3350 17 G/17G
17 POWDER, FOR SOLUTION ORAL DAILY
Status: DISCONTINUED | OUTPATIENT
Start: 2025-01-07 | End: 2025-01-07 | Stop reason: HOSPADM

## 2025-01-06 RX ORDER — ALLOPURINOL 100 MG/1
200 TABLET ORAL DAILY
Status: DISCONTINUED | OUTPATIENT
Start: 2025-01-06 | End: 2025-01-07 | Stop reason: HOSPADM

## 2025-01-06 RX ORDER — CEFAZOLIN SODIUM/WATER 2 G/20 ML
2 SYRINGE (ML) INTRAVENOUS SEE ADMIN INSTRUCTIONS
Status: DISCONTINUED | OUTPATIENT
Start: 2025-01-06 | End: 2025-01-06 | Stop reason: HOSPADM

## 2025-01-06 RX ORDER — METOPROLOL SUCCINATE 50 MG/1
50 TABLET, EXTENDED RELEASE ORAL 2 TIMES DAILY
Status: DISCONTINUED | OUTPATIENT
Start: 2025-01-06 | End: 2025-01-07 | Stop reason: HOSPADM

## 2025-01-06 RX ORDER — TRANEXAMIC ACID 650 MG/1
1950 TABLET ORAL ONCE
Status: COMPLETED | OUTPATIENT
Start: 2025-01-06 | End: 2025-01-06

## 2025-01-06 RX ORDER — ZOLPIDEM TARTRATE 5 MG/1
5 TABLET ORAL
Status: DISCONTINUED | OUTPATIENT
Start: 2025-01-06 | End: 2025-01-07 | Stop reason: HOSPADM

## 2025-01-06 RX ORDER — POTASSIUM CHLORIDE 750 MG/1
10 TABLET, EXTENDED RELEASE ORAL DAILY
Status: DISCONTINUED | OUTPATIENT
Start: 2025-01-06 | End: 2025-01-07 | Stop reason: HOSPADM

## 2025-01-06 RX ORDER — SODIUM CHLORIDE, SODIUM LACTATE, POTASSIUM CHLORIDE, CALCIUM CHLORIDE 600; 310; 30; 20 MG/100ML; MG/100ML; MG/100ML; MG/100ML
INJECTION, SOLUTION INTRAVENOUS CONTINUOUS PRN
Status: DISCONTINUED | OUTPATIENT
Start: 2025-01-06 | End: 2025-01-06

## 2025-01-06 RX ORDER — FENTANYL CITRATE 50 UG/ML
25 INJECTION, SOLUTION INTRAMUSCULAR; INTRAVENOUS EVERY 5 MIN PRN
Status: DISCONTINUED | OUTPATIENT
Start: 2025-01-06 | End: 2025-01-06 | Stop reason: HOSPADM

## 2025-01-06 RX ORDER — CEFAZOLIN SODIUM 2 G/100ML
2 INJECTION, SOLUTION INTRAVENOUS EVERY 8 HOURS
Status: COMPLETED | OUTPATIENT
Start: 2025-01-06 | End: 2025-01-07

## 2025-01-06 RX ORDER — SODIUM CHLORIDE, SODIUM LACTATE, POTASSIUM CHLORIDE, CALCIUM CHLORIDE 600; 310; 30; 20 MG/100ML; MG/100ML; MG/100ML; MG/100ML
INJECTION, SOLUTION INTRAVENOUS CONTINUOUS
Status: DISCONTINUED | OUTPATIENT
Start: 2025-01-06 | End: 2025-01-07

## 2025-01-06 RX ORDER — HYDROMORPHONE HCL IN WATER/PF 6 MG/30 ML
0.2 PATIENT CONTROLLED ANALGESIA SYRINGE INTRAVENOUS
Status: DISCONTINUED | OUTPATIENT
Start: 2025-01-06 | End: 2025-01-07 | Stop reason: HOSPADM

## 2025-01-06 RX ORDER — HYDROXYZINE HYDROCHLORIDE 10 MG/1
10 TABLET, FILM COATED ORAL EVERY 6 HOURS PRN
Qty: 30 TABLET | Refills: 0 | Status: SHIPPED | OUTPATIENT
Start: 2025-01-06

## 2025-01-06 RX ORDER — CEFAZOLIN SODIUM/WATER 2 G/20 ML
2 SYRINGE (ML) INTRAVENOUS
Status: COMPLETED | OUTPATIENT
Start: 2025-01-06 | End: 2025-01-06

## 2025-01-06 RX ORDER — HYDROMORPHONE HCL IN WATER/PF 6 MG/30 ML
0.2 PATIENT CONTROLLED ANALGESIA SYRINGE INTRAVENOUS EVERY 5 MIN PRN
Status: DISCONTINUED | OUTPATIENT
Start: 2025-01-06 | End: 2025-01-06 | Stop reason: HOSPADM

## 2025-01-06 RX ORDER — ONDANSETRON 4 MG/1
4 TABLET, ORALLY DISINTEGRATING ORAL EVERY 6 HOURS PRN
Status: DISCONTINUED | OUTPATIENT
Start: 2025-01-06 | End: 2025-01-07 | Stop reason: HOSPADM

## 2025-01-06 RX ORDER — ACETAMINOPHEN 325 MG/1
975 TABLET ORAL ONCE
Status: DISCONTINUED | OUTPATIENT
Start: 2025-01-06 | End: 2025-01-06 | Stop reason: HOSPADM

## 2025-01-06 RX ADMIN — ONDANSETRON 4 MG: 2 INJECTION INTRAMUSCULAR; INTRAVENOUS at 07:38

## 2025-01-06 RX ADMIN — CEFAZOLIN SODIUM 2 G: 2 INJECTION, SOLUTION INTRAVENOUS at 14:45

## 2025-01-06 RX ADMIN — ASPIRIN 325 MG: 325 TABLET, COATED ORAL at 14:44

## 2025-01-06 RX ADMIN — SODIUM CHLORIDE, POTASSIUM CHLORIDE, SODIUM LACTATE AND CALCIUM CHLORIDE: 600; 310; 30; 20 INJECTION, SOLUTION INTRAVENOUS at 19:22

## 2025-01-06 RX ADMIN — PROPOFOL 100 MCG/KG/MIN: 10 INJECTION, EMULSION INTRAVENOUS at 07:38

## 2025-01-06 RX ADMIN — ACETAMINOPHEN 975 MG: 325 TABLET, FILM COATED ORAL at 14:42

## 2025-01-06 RX ADMIN — LIDOCAINE HYDROCHLORIDE 20 MG: 20 INJECTION, SOLUTION INFILTRATION; PERINEURAL at 07:38

## 2025-01-06 RX ADMIN — FENTANYL CITRATE 50 MCG: 50 INJECTION, SOLUTION INTRAMUSCULAR; INTRAVENOUS at 07:18

## 2025-01-06 RX ADMIN — SODIUM CHLORIDE, POTASSIUM CHLORIDE, SODIUM LACTATE AND CALCIUM CHLORIDE: 600; 310; 30; 20 INJECTION, SOLUTION INTRAVENOUS at 09:00

## 2025-01-06 RX ADMIN — TRANEXAMIC ACID 1950 MG: 650 TABLET ORAL at 05:47

## 2025-01-06 RX ADMIN — SENNOSIDES AND DOCUSATE SODIUM 2 TABLET: 50; 8.6 TABLET ORAL at 20:32

## 2025-01-06 RX ADMIN — PHENYLEPHRINE HYDROCHLORIDE 0.5 MCG/KG/MIN: 10 INJECTION INTRAVENOUS at 07:38

## 2025-01-06 RX ADMIN — METOPROLOL SUCCINATE 50 MG: 50 TABLET, EXTENDED RELEASE ORAL at 20:03

## 2025-01-06 RX ADMIN — DEXAMETHASONE SODIUM PHOSPHATE 4 MG: 4 INJECTION, SOLUTION INTRA-ARTICULAR; INTRALESIONAL; INTRAMUSCULAR; INTRAVENOUS; SOFT TISSUE at 07:38

## 2025-01-06 RX ADMIN — ACETAMINOPHEN 975 MG: 325 TABLET, FILM COATED ORAL at 20:32

## 2025-01-06 RX ADMIN — SODIUM CHLORIDE, POTASSIUM CHLORIDE, SODIUM LACTATE AND CALCIUM CHLORIDE: 600; 310; 30; 20 INJECTION, SOLUTION INTRAVENOUS at 06:19

## 2025-01-06 RX ADMIN — ALLOPURINOL 200 MG: 100 TABLET ORAL at 16:44

## 2025-01-06 RX ADMIN — SODIUM CHLORIDE, POTASSIUM CHLORIDE, SODIUM LACTATE AND CALCIUM CHLORIDE: 600; 310; 30; 20 INJECTION, SOLUTION INTRAVENOUS at 07:29

## 2025-01-06 RX ADMIN — Medication 2 G: at 07:32

## 2025-01-06 RX ADMIN — ZOLPIDEM TARTRATE 5 MG: 5 TABLET ORAL at 19:52

## 2025-01-06 ASSESSMENT — ACTIVITIES OF DAILY LIVING (ADL)
ADLS_ACUITY_SCORE: 37
ADLS_ACUITY_SCORE: 39
ADLS_ACUITY_SCORE: 37
ADLS_ACUITY_SCORE: 39
ADLS_ACUITY_SCORE: 37
ADLS_ACUITY_SCORE: 54

## 2025-01-06 ASSESSMENT — LIFESTYLE VARIABLES: TOBACCO_USE: 0

## 2025-01-06 NOTE — CONSULTS
"[facility]  Consult Note - Hospitalist Service  Date of Admission:  1/6/2025  Consult Requested by: Orthopedics  Reason for Consult: Post-op medical management assistance    Assessment & Plan   Kyler Whitlock is a 79 year old female underwent right total knee arthroplasty 1/6/25 without major complications noted.  She feels well shortly after surgery.       Right TKA:  -  Post-op site cares, activity restrictions, pain medications, DVT proph per orthopedics.  -  PTA ASA resumed by orthopedics and also being used for DVT proph    HTN:  -  Continue metoprolol with hold parameters and a half dose of losartan tomorrow.  As BP controlled without taking her other meds yet today, will only given the metoprolol today.   Likely can move back to full PTA BP meds in 1-2 days depending on BP trend.  -  Will keep daily potassium on hold as hydrochlorothiazide also on hold + K around 5 on check today.  -  No cardiac complaints, had a recent negative stress test pre-op    Dyslipidemia:  -  Resume statin tomorrow    Chronic insomnia:  -  Continue PTA PM zolpidem         Clinically Significant Risk Factors Present on Admission                 # Drug Induced Platelet Defect: home medication list includes an antiplatelet medication   # Hypertension: Noted on problem list           # Obesity: Estimated body mass index is 36.15 kg/m  as calculated from the following:    Height as of this encounter: 1.499 m (4' 11\").    Weight as of this encounter: 81.2 kg (179 lb).              Rajiv Martinez DO  Hospitalist Service  Securely message with [employer] (more info)  Text page via [facility] Paging/Directory   ______________________________________________________________________    Chief Complaint   Post-op care    History is obtained from the patient    History of Present Illness   Kyler Whitlock is a 79 year old female who underwent right TKA earlier today.  No major complications reported.  Denies any " significant pain right now.   No SOB, nausea.  She feels hungry.   She reports she did not take any of her PTA meds this AM.      Past Medical History    Past Medical History:   Diagnosis Date    BMI 40.0-44.9, adult (H) 11/13/2017    Chronic kidney disease, stage 2 (mild)     Esophageal reflux     Gastroesophageal reflux    Essential hypertension with goal blood pressure less than 140/90     GERD (gastroesophageal reflux disease)     Hyperlipidemia LDL goal <130     Idiopathic gout     Insomnia     Left knee pain     Lymphedema of both lower extremities     Nonspecific abnormal electrocardiogram (ECG) (EKG) 08/29/2013    Peripheral polyneuropathy     PONV (postoperative nausea and vomiting)     Primary osteoarthritis of left knee     Wears hearing aid in both ears 09/29/2023       Past Surgical History   Past Surgical History:   Procedure Laterality Date    ARTHROPLASTY HIP ANTERIOR Right 8/13/2019    Procedure: RIGHT DIRECT ANTERIOR TOTAL HIP ARTHROPLASTY;  Surgeon: Gt Castillo MD;  Location:  OR    ARTHROPLASTY KNEE Left 4/15/2024    Procedure: left total knee arthroplasty;  Surgeon: Gt Castillo MD;  Location:  OR    CHOLECYSTECTOMY      HERNIORRHAPHY VENTRAL  4/12/2012    Procedure:HERNIORRHAPHY VENTRAL; ventral hernia repair with mesh; Surgeon:ELOISA INMAN; Location: [facility]    HYSTERECTOMY  5/24/01    uterine prolapse/ant. repair       Medications   I have reviewed this patient's current medications          Physical Exam   Vital Signs: Temp: 98.2  F (36.8  C) Temp src: Oral BP: 135/57 Pulse: 60   Resp: 18 SpO2: 98 % O2 Device: Nasal cannula Oxygen Delivery: 2 LPM  Weight: 179 lbs 0 oz    GEN:  Alert, oriented x 3, appears comfortable, NAD.  HEENT:  Normocephalic/atraumatic, no scleral icterus, no nasal discharge, mouth moist.  CV:  Regular rate and rhythm, no loud murmur/rub.  LUNGS:  Clear to auscultation bilaterally without rales/rhonchi/wheezing/retractions.  Symmetric chest rise on  inhalation noted.  ABD:  Active bowel sounds, soft, non-tender/non-distended.  No rebound/guarding/rigidity.  EXT:  Trace LE edema.  No cyanosis.  Detailed surgical site exam deferred to orthopedics.  Moving feet well.  SKIN:  Dry to touch, no exanthems noted in the visualized areas.    Medical Decision Making       45 MINUTES SPENT BY ME on the date of service doing chart review, history, exam, documentation & further activities per the note.      Data     I have personally reviewed the following data over the past 24 hrs:    N/A  \   12.7   / N/A     N/A N/A N/A /  101 (H)   5.1 N/A N/A \       Imaging results reviewed over the past 24 hrs:   Recent Results (from the past 24 hours)   XR Knee Port Right 1/2 Views    Narrative    EXAM: XR KNEE PORT RIGHT 1/2 VIEWS  LOCATION: [facility]  DATE: 1/6/2025    INDICATION: Post Op Total Knee  COMPARISON: None.      Impression    IMPRESSION: Postoperative changes of right knee arthroplasty. The hardware is in expected alignment. No periprosthetic fracture. Postsurgical gas within and about the joint space. Anterior surgical staples.

## 2025-01-06 NOTE — OR NURSING
Pt AOx4, denies pain, VSS.  Pt and family updated on delay for room.  Offered food/drink, pt declined.  Will continue to monitor.

## 2025-01-06 NOTE — ANESTHESIA CARE TRANSFER NOTE
Patient: Kyler Whitlock    Procedure: Procedure(s):  right total knee arthroplasty       Diagnosis: Osteoarthritis of right knee [M17.11]  Diagnosis Additional Information: No value filed.    Anesthesia Type:   Spinal     Note:    Oropharynx: oropharynx clear of all foreign objects and spontaneously breathing  Level of Consciousness: awake  Oxygen Supplementation: face mask  Level of Supplemental Oxygen (L/min / FiO2): 6  Independent Airway: airway patency satisfactory and stable  Dentition: dentition unchanged  Vital Signs Stable: post-procedure vital signs reviewed and stable  Report to RN Given: handoff report given  Patient transferred to: PACU    Handoff Report: Identifed the Patient, Identified the Reponsible Provider, Reviewed the pertinent medical history, Discussed the surgical course, Reviewed Intra-OP anesthesia mangement and issues during anesthesia, Set expectations for post-procedure period and Allowed opportunity for questions and acknowledgement of understanding      Vitals:  Vitals Value Taken Time   /80    Temp 36.8 c    Pulse 74 01/06/25 0930   Resp 13 01/06/25 0930   SpO2 100 % 01/06/25 0930   Vitals shown include unfiled device data.    Electronically Signed By: CHIKI Jessica CRNA  January 6, 2025  9:31 AM

## 2025-01-06 NOTE — ANESTHESIA POSTPROCEDURE EVALUATION
Patient: Kyler Whitlock    Procedure: Procedure(s):  right total knee arthroplasty       Anesthesia Type:  Spinal    Note:     Postop Pain Control: Uneventful            Sign Out: Well controlled pain   PONV: No   Neuro/Psych: Uneventful            Sign Out: Acceptable/Baseline neuro status   Airway/Respiratory: Uneventful            Sign Out: Acceptable/Baseline resp. status   CV/Hemodynamics: Uneventful            Sign Out: Acceptable CV status   Other NRE: NONE   DID A NON-ROUTINE EVENT OCCUR?            Last vitals:  Vitals Value Taken Time   /60 01/06/25 1000   Temp 36.2  C (97.1  F) 01/06/25 0945   Pulse 57 01/06/25 1006   Resp 16 01/06/25 1006   SpO2 99 % 01/06/25 1006   Vitals shown include unfiled device data.    Electronically Signed By: Mert Gutierrez MD  January 6, 2025  10:08 AM

## 2025-01-06 NOTE — ANESTHESIA PREPROCEDURE EVALUATION
Anesthesia Pre-Procedure Evaluation    Patient: Kyler Whitlock   MRN: [id number] : 1945        Procedure : Procedure(s):  right total knee arthroplasty          Past Medical History:   Diagnosis Date    BMI 40.0-44.9, adult (H) 2017    Chronic kidney disease, stage 2 (mild)     Esophageal reflux     Gastroesophageal reflux    Essential hypertension with goal blood pressure less than 140/90     GERD (gastroesophageal reflux disease)     Hyperlipidemia LDL goal <130     Idiopathic gout     Insomnia     Left knee pain     Lymphedema of both lower extremities     Nonspecific abnormal electrocardiogram (ECG) (EKG) 2013    Peripheral polyneuropathy     PONV (postoperative nausea and vomiting)     Primary osteoarthritis of left knee     Wears hearing aid in both ears 2023      Past Surgical History:   Procedure Laterality Date    ARTHROPLASTY HIP ANTERIOR Right 2019    Procedure: RIGHT DIRECT ANTERIOR TOTAL HIP ARTHROPLASTY;  Surgeon: Gt Castillo MD;  Location:  OR    ARTHROPLASTY KNEE Left 4/15/2024    Procedure: left total knee arthroplasty;  Surgeon: Gt Castillo MD;  Location:  OR    CHOLECYSTECTOMY      HERNIORRHAPHY VENTRAL  2012    Procedure:HERNIORRHAPHY VENTRAL; ventral hernia repair with mesh; Surgeon:ELOISA INMAN; Location: [facility]    HYSTERECTOMY  01    uterine prolapse/ant. repair      No Known Allergies   Social History     Tobacco Use    Smoking status: Never    Smokeless tobacco: Never   Substance Use Topics    Alcohol use: No      Wt Readings from Last 1 Encounters:   25 81.2 kg (179 lb)        Anesthesia Evaluation   Pt has had prior anesthetic.     History of anesthetic complications  - PONV.      ROS/MED HX  ENT/Pulmonary:     (+)     JACKLYN risk factors, snores loudly, hypertension, obese, daytime somnolence,                            (-) tobacco use   Neurologic:     (+)    peripheral neuropathy,                             Cardiovascular:     (+) Dyslipidemia hypertension- Peripheral Vascular Disease-- Other.   -  - -                                 Previous cardiac testing   Echo: Date: Results:    Stress Test:  Date: 12/30/24 Results:  Interpretation Summary  Normal dobutamine stress echocardiogram at target heart rate.  No resting or stress induced regional wall motion abnormalities.  Normal resting and stress ECGs.  No symptoms during the test.  Normal BP and heart rate response to dobutamine.  Normal baseline screening echocardiogram with no significant valvular  abnormalities.  Visible portion of the aorta is normal    ECG Reviewed:  Date: Results:    Cath:  Date: Results:      METS/Exercise Tolerance:     Hematologic:       Musculoskeletal:   (+)  arthritis,             GI/Hepatic:     (+) GERD,                   Renal/Genitourinary:     (+) renal disease (Stage 2), type: CRI,            Endo:     (+)               Obesity (Class 2),       Psychiatric/Substance Use:       Infectious Disease:       Malignancy:       Other: Comment: Hearing aids           Physical Exam    Airway        Mallampati: II   TM distance: > 3 FB   Neck ROM: full   Mouth opening: > 3 cm    Respiratory Devices and Support         Dental     Comment: Dentures top and bottom    (+) Edentulous      Cardiovascular          Rhythm and rate: regular and normal     Pulmonary           breath sounds clear to auscultation           OUTSIDE LABS:  CBC:   Lab Results   Component Value Date    WBC 7.8 10/02/2024    WBC 5.1 04/01/2024    HGB 12.7 01/06/2025    HGB 12.0 10/02/2024    HCT 37.2 10/02/2024    HCT 37.9 04/01/2024     10/02/2024     04/01/2024     BMP:   Lab Results   Component Value Date     12/27/2024     10/02/2024    POTASSIUM 5.1 01/06/2025    POTASSIUM 4.9 12/27/2024    CHLORIDE 102 12/27/2024    CHLORIDE 104 10/02/2024    CO2 28 12/27/2024    CO2 28 10/02/2024    BUN 20.9 12/27/2024    BUN 24.7 (H) 10/02/2024    CR 0.83  "12/27/2024    CR 0.88 10/02/2024     (H) 01/06/2025     (H) 12/27/2024     COAGS:   Lab Results   Component Value Date    INR 1.05 04/15/2024     POC:   Lab Results   Component Value Date     (H) 08/15/2019     HEPATIC:   Lab Results   Component Value Date    ALBUMIN 4.1 04/01/2024    PROTTOTAL 6.9 04/01/2024    ALT 15 04/01/2024    AST 30 04/01/2024    ALKPHOS 72 04/01/2024    BILITOTAL 0.5 04/01/2024     OTHER:   Lab Results   Component Value Date    RICO 9.7 12/27/2024    TSH 2.30 09/29/2023    T4 1.08 07/08/2016    T4 10.3 07/08/2016    CRP <2.9 09/26/2022    SED 26 09/26/2022       Anesthesia Plan    ASA Status:  3    NPO Status:  NPO Appropriate    Anesthesia Type: Spinal.              Consents    Anesthesia Plan(s) and associated risks, benefits, and realistic alternatives discussed. Questions answered and patient/representative(s) expressed understanding.     - Discussed:     - Discussed with:  Patient            Postoperative Care    Pain management: IV analgesics, Oral pain medications, Peripheral nerve block (Single Shot), Multi-modal analgesia.   PONV prophylaxis: Ondansetron (or other 5HT-3)     Comments:             Mert Gutierrez MD    I have reviewed the pertinent notes and labs in the chart from the past 30 days and (re)examined the patient.  Any updates or changes from those notes are reflected in this note.             # Drug Induced Platelet Defect: home medication list includes an antiplatelet medication   # Hypertension: Noted on problem list           # Obesity: Estimated body mass index is 36.15 kg/m  as calculated from the following:    Height as of this encounter: 1.499 m (4' 11\").    Weight as of this encounter: 81.2 kg (179 lb).             "

## 2025-01-06 NOTE — ANESTHESIA PROCEDURE NOTES
Adductor canal (targeting saphenous nerve) Procedure Note    Pre-Procedure   Staff -        Anesthesiologist:  Mert Gutierrez MD       Performed By: anesthesiologist       Location: pre-op       Pre-Anesthestic Checklist: patient identified, IV checked, site marked, risks and benefits discussed, informed consent, monitors and equipment checked, pre-op evaluation, at physician/surgeon's request and post-op pain management  Timeout:       Correct Patient: Yes        Correct Procedure: Yes        Correct Site: Yes        Correct Position: Yes        Correct Laterality: Yes        Site Marked: Yes  Procedure Documentation  Procedure: Adductor canal (targeting saphenous nerve)         Patient Position: supine       Skin prep: Chloraprep       Local skin infiltrated with 1 mL of 1% lidocaine.        Needle Type: insulated       Needle Gauge: 21.        Needle Length (millimeters): 90        Ultrasound guided       1. Ultrasound was used to identify targeted nerve, plexus, vascular marker, or fascial plane and place a needle adjacent to it in real-time.       2. Ultrasound was used to visualize the spread of anesthetic in close proximity to the above referenced structure.       3. A permanent image is entered into the patient's record.       4. The visualized anatomic structures appeared normal.       5. There were no apparent abnormal pathologic findings.    Assessment/Narrative         The placement was negative for: blood aspirated, painful injection and site bleeding       Paresthesias: No.       Bolus given via needle..        Secured via.        Insertion/Infusion Method: Single Shot       Complications: none     Comments:  Bolus via needle, 15 mL of 0.5% ropivacaine with 1:400,000 epinephrine.    Under ultrasound guidance, a 21 gauge needle was inserted and placed in close proximity to the saphenous nerve. Ultrasound was also used to visualize the spread of anesthetic in close proximity to the nerve being  "blocked. The nerve appeared anatomically normal, and there were no apparent abnormal pathological findings. Patient tolerated well, was mildly sedated but communicative throughout the procedure. A permanent ultrasound image was saved in the patient's record.    The surgeon has given a verbal order transferring care of this patient to me for the performance of regional analgesia block for post op pain control. It is requested of me because I am uniquely trained and qualified to perform this block and the surgeon is neither trained nor qualified to perform this procedure.         FOR [facility] ([facility] ONLY:   Pain Team Contact information: please page the Pain Team Via [employer]. Search \"Pain\". During daytime hours, please page the attending first. At night please page the resident first.      "

## 2025-01-06 NOTE — BRIEF OP NOTE
[facility]    Brief Operative Note    Pre-operative diagnosis: Osteoarthritis of right knee [M17.11]  Post-operative diagnosis Same as pre-operative diagnosis    Procedure: right total knee arthroplasty, Right - Knee    Surgeon: Surgeons and Role:     * Gt Castillo MD - Primary  Anesthesia: General   Estimated Blood Loss: Less than 100 ml    Drains: None  Specimens: * No specimens in log *  Findings:   Advanced DJD right knee .  Complications: None.  Implants:   Implant Name Type Inv. Item Serial No.  Lot No. LRB No. Used Action   Standard Femur Right Size 6 Metallic Hardware/Gueydan   ALMA [id number] Right 1 Implanted   Tibia Right Size D Metallic Hardware/Gueydan   ALMA [id number] Right 1 Implanted   SURFACE ARTC 14MM MARIE GRAHAM CNGR 6-7 C-D KN RT TIB - [id number] Total Joint Component/Insert SURFACE ARTC 14MM MARIE GRAHAM CN 6-7 C-D KN RT TIB  [employer] [id number] Right 1 Implanted

## 2025-01-06 NOTE — OP NOTE
Procedure Date: 01/06/25     PREOPERATIVE DIAGNOSIS:  Degenerative arthritis of the right knee.    POSTOPERATIVE DIAGNOSIS:  Degenerative arthritis of the right knee.     PROCEDURE PERFORMED:  right total knee arthroplasty.     SURGEON:  Gt Castillo MD     FIRST ASSISTANT:  IDA Zheng        DESCRIPTION OF PROCEDURE:  The patient was brought to the operating room and given an adductor canal block.  They were then given a Spinal anesthetic.  Their right knee and right lower extremity were then prepped and draped in the usual sterile fashion.  The entire procedure was performed without the use of a tourniquet.  An incision was made over the anterior aspect of the knee.  This was carried down through the subcutaneous tissues, down to the fascia.  A standard median parapatellar approach was taken to the joint, which revealed advanced degenerative changes consistent with the pre-operative imaging studies.  The fat pad was excised.  Menisci were excised.  The ACL was excised.  A drill hole was then made in the distal femur and using the intramedullary guide set to 5 degrees from the anatomic axis, the femur was cut to accept a size 6 Persona un-cemented cruciate retaining femoral component.  The tibia was cut using the external alignment guide to accept a size D Persona un-cemented tibial component.  The patellar articular cartilage had only mild degenerative changes, so we elected to just trim the osteophytes from the periphery and leave the patella un-resurfaced.  The knee was then trialed.  The alignment appeared satisfactory.  Ligaments were stable and balanced.  Trial components were removed.  The tibia was punched.  We then injected the posterior capsule with the periarticular analgesic cocktail.  Hemostasis was obtained.  The knee was then thoroughly irrigated and dried and the real components were impacted into place.   We trialed the knee, and it appeared that a 14 mm insert gave the best fit.  A  real 14 mm tibial polyethylene insert was then snapped into the tibial component.  The knee had full range of motion.  The patella tracked well.  We irrigated the knee with a dilute solution of Betadine.  It was allowed to sit within the knee for 3 minutes and was thoroughly irrigated from the knee with a liter of normal saline. The fascia was closed with interrupted 0 Vicryl sutures.  The skin was closed with 2-0 Vicryl subcutaneous sutures and staples.  A sterile compressive dressing was applied to the knee.  The patient was then awakened from anesthesia and transferred to postanesthesia recovery in satisfactory condition.     It should be noted that my assistant was necessary throughout the entire procedure to assist with retraction and positioning.     Gt Castillo MD

## 2025-01-06 NOTE — PROGRESS NOTES
01/06/25 1700   Appointment Info   Signing Clinician's Name / Credentials (PT) Thai Griffith DPT   Rehab Comments (PT) WBAT RLE, ROM as zakia. Pt just had L TKA on 4/15/24, did well with mobility at discharge with dtr in law staying to help. Same DC plan this time, did well in PT and will have help with all ADL's if needed. Recommend deferring OT   Living Environment   People in Home alone   Current Living Arrangements house   Home Accessibility stairs to enter home   Number of Stairs, Main Entrance 3   Stair Railings, Main Entrance railing on right side (ascending)   Transportation Anticipated family or friend will provide   Living Environment Comments Pt lives alone but dtr in law staying with her   Self-Care   Usual Activity Tolerance good   Current Activity Tolerance moderate   Equipment Currently Used at Home none   Fall history within last six months no   Activity/Exercise/Self-Care Comment At baseline pt IND with mobility, uses SPC sometimes and has FWW at home from prev surgery   General Information   Onset of Illness/Injury or Date of Surgery 01/06/25   Referring Physician Gt Castillo MD   Patient/Family Therapy Goals Statement (PT) go home   Pertinent History of Current Problem (include personal factors and/or comorbidities that impact the POC) 79 y.o. female s/p R TKA on 1/6/2024, POD#0, hx of L TKA on 4/15/2024   Existing Precautions/Restrictions fall   Weight-Bearing Status - LLE full weight-bearing   Weight-Bearing Status - RLE weight-bearing as tolerated   Cognition   Affect/Mental Status (Cognition) WNL   Orientation Status (Cognition) oriented x 4   Follows Commands (Cognition) WNL   Pain Assessment   Patient Currently in Pain No   Integumentary/Edema   Integumentary/Edema no deficits were identifed   Posture    Posture Forward head position   Range of Motion (ROM)   Range of Motion ROM deficits secondary to surgical procedure;ROM deficits secondary to pain;ROM deficits secondary to  swelling;ROM deficits secondary to weakness   ROM Comment R knee AROM ~0-45   Strength (Manual Muscle Testing)   Strength (Manual Muscle Testing) Able to perform R SLR;Able to perform L SLR;Deficits observed during functional mobility   Strength Comments deficits with R knee but able to demo good quad contraction and just barely SLR   Bed Mobility   Comment, (Bed Mobility) SBA supine>sit, Dev sit>supine   Transfers   Comment, (Transfers) CGA with FWW   Gait/Stairs (Locomotion)   [place] Level (Gait) contact guard   Assistive Device (Gait) walker, front-wheeled   Distance in Feet (Gait) 10'   Pattern (Gait) step-through   Deviations/Abnormal Patterns (Gait) antalgic   Balance   Balance Comments sitting balance good, static standing with FWW good, impaired dynamic balance but able to amb with FWW and asst x 1   Sensory Examination   Sensory Perception patient reports no sensory changes   Clinical Impression   Criteria for Skilled Therapeutic Intervention Yes, treatment indicated   PT Diagnosis (PT) impaired gait   Influenced by the following impairments pain, deficits with ROM, strength, balance   Functional limitations due to impairments decreased ind with functional mobility   Clinical Presentation (PT Evaluation Complexity) stable   Clinical Presentation Rationale PMH and clinical judgement   Clinical Decision Making (Complexity) low complexity   Planned Therapy Interventions (PT) balance training;bed mobility training;cryotherapy;gait training;home exercise program;patient/family education;ROM (range of motion);stair training;strengthening;stretching;transfer training;progressive activity/exercise   Risk & Benefits of therapy have been explained care plan/treatment goals reviewed;evaluation/treatment results reviewed;current/potential barriers reviewed;risks/benefits reviewed;participants voiced agreement with care plan;participants included   PT Total Evaluation Time   PT Eval, Low Complexity Minutes  (93977) 10   Physical Therapy Goals   PT Frequency 2x/day   PT Predicted Duration/Target Date for Goal Attainment 01/07/25   PT Goals Bed Mobility;Gait;Stairs;Transfers   PT: Bed Mobility Modified independent;Supine to/from sit   PT: Transfers Supervision/stand-by assist;Sit to/from stand;Assistive device   PT: Gait Supervision/stand-by assist;Rolling walker;Greater than 200 feet   PT: Stairs Minimal assist;3 stairs;Rail on right   Interventions   Interventions Quick Adds Gait Training;Therapeutic Activity;Therapeutic Procedure   Therapeutic Procedure/Exercise   Ther. Procedure: strength, endurance, ROM, flexibillity Minutes (25816) 5   Symptoms Noted During/After Treatment increased pain   Treatment Detail/Skilled Intervention Educated on post surgical benefits of TE on circulation, functional ROM, and strength. Left pt with TE handout. With pt in supine cued for AP's x 20, on RLE: quad sets x 10, heel slides x 10, SLR x 5. Pt tolerated all TE well   Therapeutic Activity   Therapeutic Activities: dynamic activities to improve functional performance Minutes (55247) 10   Symptoms Noted During/After Treatment Fatigue   Treatment Detail/Skilled Intervention Greeted pt supine in bed. Eval completed. Educated on orders for WBAT, ROM to flexion tolerance, and importance of keeping knee straight when resting and demonstrated how to do so. With HOB elevated supine>sit SBA with cues for sequencing provided, with HOB flat sit>supine Dev pt needing assist lifting RLE into bed. Pt able to reposition in bed IND. Sit<>stand with FWW CGA-SBA, cues for safe walker and hand placement and pt able to demo back well. Increased time for line management and room set up   Gait Training   Gait Training Minutes (30855) 10   Symptoms Noted During/After Treatment (Gait Training) fatigue   Treatment Detail/Skilled Intervention Pt stood EOB with FWW and CGA, cued for pre-gait stepping in place and pt able to clear both feet well and demo no  knee buckling. Pt amb into hallway ~250' with FWW and CGA progressing to SBA, slow gait speed and kena, antalgic gait but no LOB noted. Cues to glide walker and not , pt initially exaggerating lifing RLE cued for normal step-through walking pattern landing on heel-to-toe and pt able to demo back well   Distance in Feet 250'   PT Discharge Planning   PT Plan review/progress HEP, progress gait with FWW, trial steps   PT Discharge Recommendation (DC Rec)   (defer to ortho)   PT Rationale for DC Rec Pt below baseline but moving well and using safe technique, able to amb with FWW and CGA/SBA ~250'. Anticipate pt will progress and by discharge be at/near SBA bed mobility, SBA transfers with FWW, SBA amb with FWW, Dev for steps. Pt will have good 24/7 support at home from dtr in law who is staying with her   PT Brief overview of current status CGA/SBA with FWW - Goals of therapy will be to address safe mobility and make recs for d/c to next level of care. Pt and RN will continue to follow all falls risk precautions as documented by RN staff while hospitalized   PT Total Distance Amb During Session (feet) 250   Physical Therapy Time and Intention   Timed Code Treatment Minutes 25   Total Session Time (sum of timed and untimed services) 35

## 2025-01-07 VITALS
OXYGEN SATURATION: 94 % | HEIGHT: 59 IN | RESPIRATION RATE: 16 BRPM | SYSTOLIC BLOOD PRESSURE: 138 MMHG | TEMPERATURE: 98.2 F | WEIGHT: 179 LBS | HEART RATE: 54 BPM | DIASTOLIC BLOOD PRESSURE: 53 MMHG | BODY MASS INDEX: 36.08 KG/M2

## 2025-01-07 LAB
ANION GAP SERPL CALCULATED.3IONS-SCNC: 8 MMOL/L (ref 7–15)
BUN SERPL-MCNC: 35.9 MG/DL (ref 8–23)
CALCIUM SERPL-MCNC: 8.7 MG/DL (ref 8.8–10.4)
CHLORIDE SERPL-SCNC: 104 MMOL/L (ref 98–107)
CREAT SERPL-MCNC: 1.07 MG/DL (ref 0.51–0.95)
EGFRCR SERPLBLD CKD-EPI 2021: 52 ML/MIN/1.73M2
GLUCOSE SERPL-MCNC: 115 MG/DL (ref 70–99)
HCO3 SERPL-SCNC: 27 MMOL/L (ref 22–29)
HGB BLD-MCNC: 10.3 G/DL (ref 11.7–15.7)
POTASSIUM SERPL-SCNC: 4.5 MMOL/L (ref 3.4–5.3)
SODIUM SERPL-SCNC: 139 MMOL/L (ref 135–145)

## 2025-01-07 PROCEDURE — 99214 OFFICE O/P EST MOD 30 MIN: CPT | Performed by: STUDENT IN AN ORGANIZED HEALTH CARE EDUCATION/TRAINING PROGRAM

## 2025-01-07 PROCEDURE — 97110 THERAPEUTIC EXERCISES: CPT | Mod: GP,CQ | Performed by: PHYSICAL THERAPY ASSISTANT

## 2025-01-07 PROCEDURE — 250N000013 HC RX MED GY IP 250 OP 250 PS 637: Performed by: INTERNAL MEDICINE

## 2025-01-07 PROCEDURE — 250N000011 HC RX IP 250 OP 636: Mod: JZ | Performed by: ORTHOPAEDIC SURGERY

## 2025-01-07 PROCEDURE — 85018 HEMOGLOBIN: CPT | Performed by: ORTHOPAEDIC SURGERY

## 2025-01-07 PROCEDURE — 36415 COLL VENOUS BLD VENIPUNCTURE: CPT | Performed by: ORTHOPAEDIC SURGERY

## 2025-01-07 PROCEDURE — 80048 BASIC METABOLIC PNL TOTAL CA: CPT | Performed by: INTERNAL MEDICINE

## 2025-01-07 PROCEDURE — 250N000013 HC RX MED GY IP 250 OP 250 PS 637: Performed by: ORTHOPAEDIC SURGERY

## 2025-01-07 PROCEDURE — 97530 THERAPEUTIC ACTIVITIES: CPT | Mod: GP,CQ | Performed by: PHYSICAL THERAPY ASSISTANT

## 2025-01-07 PROCEDURE — 999N000111 HC STATISTIC OT IP EVAL DEFER

## 2025-01-07 PROCEDURE — 97116 GAIT TRAINING THERAPY: CPT | Mod: GP,CQ | Performed by: PHYSICAL THERAPY ASSISTANT

## 2025-01-07 RX ORDER — POTASSIUM CHLORIDE 750 MG/1
10 TABLET, EXTENDED RELEASE ORAL DAILY
Qty: 90 TABLET | Refills: 3 | Status: SHIPPED | OUTPATIENT
Start: 2025-01-07

## 2025-01-07 RX ORDER — LOSARTAN POTASSIUM 100 MG/1
100 TABLET ORAL DAILY
Qty: 90 TABLET | Refills: 3 | Status: SHIPPED | OUTPATIENT
Start: 2025-01-07 | End: 2025-01-07

## 2025-01-07 RX ORDER — LOSARTAN POTASSIUM 100 MG/1
50 TABLET ORAL DAILY
Qty: 90 TABLET | Refills: 3 | Status: SHIPPED | OUTPATIENT
Start: 2025-01-07

## 2025-01-07 RX ORDER — LOSARTAN POTASSIUM 100 MG/1
50 TABLET ORAL DAILY
Qty: 90 TABLET | Refills: 3 | Status: SHIPPED | OUTPATIENT
Start: 2025-01-07 | End: 2025-01-07

## 2025-01-07 RX ORDER — TRIAMTERENE AND HYDROCHLOROTHIAZIDE 37.5; 25 MG/1; MG/1
1 TABLET ORAL DAILY
Qty: 90 TABLET | Refills: 3 | Status: SHIPPED | OUTPATIENT
Start: 2025-01-07

## 2025-01-07 RX ADMIN — ALLOPURINOL 200 MG: 100 TABLET ORAL at 08:52

## 2025-01-07 RX ADMIN — ASPIRIN 325 MG: 325 TABLET, COATED ORAL at 08:51

## 2025-01-07 RX ADMIN — CEFAZOLIN SODIUM 2 G: 2 INJECTION, SOLUTION INTRAVENOUS at 00:24

## 2025-01-07 RX ADMIN — LOSARTAN POTASSIUM 50 MG: 50 TABLET, FILM COATED ORAL at 08:52

## 2025-01-07 RX ADMIN — ACETAMINOPHEN 975 MG: 325 TABLET, FILM COATED ORAL at 12:44

## 2025-01-07 RX ADMIN — HYDROXYZINE HYDROCHLORIDE 10 MG: 10 TABLET ORAL at 12:44

## 2025-01-07 RX ADMIN — PRAVASTATIN SODIUM 20 MG: 20 TABLET ORAL at 08:51

## 2025-01-07 RX ADMIN — OXYCODONE HYDROCHLORIDE 5 MG: 5 TABLET ORAL at 00:24

## 2025-01-07 RX ADMIN — ACETAMINOPHEN 975 MG: 325 TABLET, FILM COATED ORAL at 04:44

## 2025-01-07 ASSESSMENT — ACTIVITIES OF DAILY LIVING (ADL)
ADLS_ACUITY_SCORE: 45
ADLS_ACUITY_SCORE: 39
ADLS_ACUITY_SCORE: 45
ADLS_ACUITY_SCORE: 41
ADLS_ACUITY_SCORE: 45
ADLS_ACUITY_SCORE: 41
ADLS_ACUITY_SCORE: 41
ADLS_ACUITY_SCORE: 45
ADLS_ACUITY_SCORE: 45

## 2025-01-07 NOTE — PROGRESS NOTES
"[facility]    Medicine Progress Note - Hospitalist Service    Date of Admission:  1/6/2025    Assessment & Plan   Kyler Whitlock is a 79 year old female underwent right total knee arthroplasty 1/6/25 without major complications noted.  She feels well shortly after surgery.       Right TKA:  -  Post-op site cares, activity restrictions, pain medications, DVT proph per orthopedics.  -  PTA ASA resumed by orthopedics and also being used for DVT proph    Mild creatinine elevation  Baseline creatinine is 0.8. Up to 1.07 post operatively.   - Repeat BMP within 1 week   - Encouraged PO hydration  - Adjust BP meds as noted below     HTN:  --Continue PTA metoprolol   --Resume losartan at half dose (50mg daily) 1/7 at least until follow-up, if renal function improved can increase back to 100mg   --Hold hydrochlorothiazide until PCP follow-up   --Potassium has been high normal, outpatient in October was 5.7 as well. Will have her stop K supplement and follow up PCP to recheck     Dyslipidemia:  - Resume statin     Chronic insomnia:  - Continue PTA PM zolpidem        Diet: Advance Diet as Tolerated: Regular Diet Adult  Discharge Instruction - Regular Diet Adult    DVT Prophylaxis: Defer to primary service  Lopez Catheter: Not present  Lines: None     Cardiac Monitoring: None  Code Status: Full Code      Clinically Significant Risk Factors Present on Admission                 # Drug Induced Platelet Defect: home medication list includes an antiplatelet medication   # Hypertension: Noted on problem list           # Obesity: Estimated body mass index is 36.15 kg/m  as calculated from the following:    Height as of this encounter: 1.499 m (4' 11\").    Weight as of this encounter: 81.2 kg (179 lb).              Social Drivers of Health    Social Connections: Unknown (10/2/2024)    Social Connection and Isolation Panel [NHANES]     Frequency of Social Gatherings with Friends and Family: Never        "   Disposition Plan     Medically Ready for Discharge: Ready Now         ______________________________________________________________________    Interval History   NAEO. Pt didn't sleep well. Having a bit more pain in the knee this time around a compared to her last knee replacement. Not eating or drinking very well yet. No chest pain or shortness of breath.     Physical Exam   Temp: 98.2  F (36.8  C) Temp src: Oral BP: 138/53 Pulse: 56   Resp: 16 SpO2: 94 % O2 Device: None (Room air) Oxygen Delivery: 2 LPM  Vitals:    01/06/25 0553   Weight: 81.2 kg (179 lb)     Vital Signs with Ranges  Temp:  [96.9  F (36.1  C)-98.3  F (36.8  C)] 98.2  F (36.8  C)  Pulse:  [54-74] 56  Resp:  [12-18] 16  BP: ()/(49-78) 138/53  SpO2:  [94 %-100 %] 94 %  I/O last 3 completed shifts:  In: 1400 [I.V.:1400]  Out: 800 [Urine:700; Blood:100]    Constitutional: Alert and oriented, no acute distress  ENT: normal cephalic, moist mucous membranes  Respiratory: Lungs clear to auscultation bilaterally, no increased work of breathing  Cardiovascular: Regular rate and rhythm, no murmur, no edema, distal pulses +2/4  GI: Normal active bowel sounds, abdomen soft, non-tender  Skin/Integumen: warm, dry  Other:        Medical Decision Making       45 MINUTES SPENT BY ME on the date of service doing chart review, history, exam, documentation & further activities per the note.      Data         Imaging results reviewed over the past 24 hrs:   Recent Results (from the past 24 hours)   XR Knee Port Right 1/2 Views    Narrative    EXAM: XR KNEE PORT RIGHT 1/2 VIEWS  LOCATION: [facility]  DATE: 1/6/2025    INDICATION: Post Op Total Knee  COMPARISON: None.      Impression    IMPRESSION: Postoperative changes of right knee arthroplasty. The hardware is in expected alignment. No periprosthetic fracture. Postsurgical gas within and about the joint space. Anterior surgical charleen.     Ana Durán MD

## 2025-01-07 NOTE — PROGRESS NOTES
Aox4, calm cooperative. VSS on RA. SBA GB/ walker. Up to bathroom to void. CMS to RLE intact, dressing CDI. Regular diet. Pain managed with PRN atarax and scheduled tylenol. AVS and discharge instructions reviewed with patient and daughter in law. All medications and belongings sent home with patient.

## 2025-01-07 NOTE — PROGRESS NOTES
"POD# 1    SUBJECTIVE  Pain well managed  Mobility improving    OBJECTIVE:   /49 (BP Location: Left arm)   Pulse 55   Temp 98  F (36.7  C) (Oral)   Resp 16   Ht 1.499 m (4' 11\")   Wt 81.2 kg (179 lb)   SpO2 97%   BMI 36.15 kg/m     Hemoglobin   Date Value Ref Range Status   01/06/2025 12.7 11.7 - 15.7 g/dL Final   01/26/2021 12.8 11.7 - 15.7 g/dL Final   ]   Wound: Dressing dry  Hgb pending    ASSESSMENT   Stable    PLAN   Mobilize in PT  Home later today  Follow up 2 weeks post-op    Gt Castillo MD   "

## 2025-01-07 NOTE — PLAN OF CARE
Goal Outcome Evaluation:                      Summary:    1/7/25 [id number]  Diagnosis:Right total knee  POD#:1  Mental Status:AxOx4  Activity/dangle: Ax1 gb and walker   Diet:Regular   Pain:schedule tylenol and PRN oxycodone      Lopez/Voiding:Up to bathroom  02/LDA: PIV  saline lock   D/C Date:Possibly today   Other: CMS intact, dressing CDI

## 2025-01-07 NOTE — PLAN OF CARE
OT: Order received, chart reviewed and discussed with care team and patient. 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 Understands how to complete ADLs during healing process, no trouble last time. Walked 250ft with SBA with PT last night. Has good support at home from daughter in law. Defer discharge recommendations to physical therapy, anticipate home. Will complete orders.

## 2025-01-07 NOTE — PROGRESS NOTES
Diagnosis:Right total knee  POD#:0  Mental Status:AxOx4  Activity/dangle: Ax1 gb and walker   Diet:Regular   Pain:Denies   Lopez/Voiding:Up to bathroom  02/LDA:Continuous fluids   D/C Date:1/7     Patient vital signs are at baseline: Yes  Patient able to ambulate as they were prior to admission or with assist devices provided by therapies during their stay:  Yes  Patient MUST void prior to discharge:  Yes  Patient able to tolerate oral intake:  Yes  Pain has adequate pain control using Oral analgesics:  Yes  Does patient have an identified :  Yes  Has goal D/C date and time been discussed with patient:  Yes

## 2025-01-16 DIAGNOSIS — G47.00 INSOMNIA, UNSPECIFIED TYPE: ICD-10-CM

## 2025-01-20 RX ORDER — ZOLPIDEM TARTRATE 5 MG/1
5 TABLET ORAL
Qty: 90 TABLET | Refills: 0 | Status: SHIPPED | OUTPATIENT
Start: 2025-01-20

## 2025-01-22 ENCOUNTER — TRANSFERRED RECORDS (OUTPATIENT)
Dept: HEALTH INFORMATION MANAGEMENT | Facility: CLINIC | Age: 80
End: 2025-01-22
Payer: MEDICARE

## 2025-03-11 DIAGNOSIS — M10.00 IDIOPATHIC GOUT, UNSPECIFIED CHRONICITY, UNSPECIFIED SITE: ICD-10-CM

## 2025-03-11 RX ORDER — ALLOPURINOL 100 MG/1
200 TABLET ORAL DAILY
Qty: 180 TABLET | Refills: 3 | Status: SHIPPED | OUTPATIENT
Start: 2025-03-11

## 2025-03-22 DIAGNOSIS — E78.5 HYPERLIPIDEMIA LDL GOAL <130: ICD-10-CM

## 2025-03-24 RX ORDER — PRAVASTATIN SODIUM 20 MG
20 TABLET ORAL DAILY
Qty: 90 TABLET | Refills: 1 | Status: SHIPPED | OUTPATIENT
Start: 2025-03-24

## 2025-04-03 ENCOUNTER — TRANSFERRED RECORDS (OUTPATIENT)
Dept: HEALTH INFORMATION MANAGEMENT | Facility: CLINIC | Age: 80
End: 2025-04-03
Payer: MEDICARE

## 2025-04-21 DIAGNOSIS — G47.00 INSOMNIA, UNSPECIFIED TYPE: ICD-10-CM

## 2025-04-21 RX ORDER — ZOLPIDEM TARTRATE 5 MG/1
5 TABLET ORAL
Qty: 90 TABLET | Refills: 0 | Status: SHIPPED | OUTPATIENT
Start: 2025-04-21

## 2025-05-14 DIAGNOSIS — N95.2 ATROPHIC VAGINITIS: ICD-10-CM

## 2025-05-15 RX ORDER — TRIAMCINOLONE ACETONIDE 1 MG/G
CREAM TOPICAL DAILY PRN
Qty: 30 G | Refills: 0 | Status: SHIPPED | OUTPATIENT
Start: 2025-05-15

## 2025-06-22 DIAGNOSIS — G47.00 INSOMNIA, UNSPECIFIED TYPE: ICD-10-CM

## 2025-06-22 DIAGNOSIS — N95.2 ATROPHIC VAGINITIS: ICD-10-CM

## 2025-06-23 RX ORDER — ZOLPIDEM TARTRATE 5 MG/1
5 TABLET ORAL
Qty: 90 TABLET | Refills: 0 | Status: SHIPPED | OUTPATIENT
Start: 2025-06-23

## 2025-06-23 RX ORDER — TRIAMCINOLONE ACETONIDE 1 MG/G
CREAM TOPICAL DAILY PRN
Qty: 30 G | Refills: 0 | Status: SHIPPED | OUTPATIENT
Start: 2025-06-23

## 2025-07-22 ENCOUNTER — TRANSFERRED RECORDS (OUTPATIENT)
Dept: HEALTH INFORMATION MANAGEMENT | Facility: CLINIC | Age: 80
End: 2025-07-22
Payer: MEDICARE

## (undated) DEVICE — DRAPE SHEET REV FOLD 3/4 9349

## (undated) DEVICE — GLOVE BIOGEL PI MICRO INDICATOR UNDERGLOVE SZ 7.5 48975

## (undated) DEVICE — DRSG AQUACEL AG HYDROFIBER 3.5X6" 422604

## (undated) DEVICE — LINEN TOWEL PACK X5 5464

## (undated) DEVICE — SU ETHIBOND 2 V-37 4X30" MX69G

## (undated) DEVICE — SU VICRYL 2-0 CT-1 27" UND J259H

## (undated) DEVICE — DRSG AQUACEL AG HYDROFIBER  3.5X10" 422605

## (undated) DEVICE — NDL SPINAL 18GA 3.5" 405184

## (undated) DEVICE — BONE CLEANING TIP INTERPULSE  0210-010-000

## (undated) DEVICE — SOL WATER IRRIG 1000ML BOTTLE 2F7114

## (undated) DEVICE — GLOVE PROTEXIS W/NEU-THERA 7.5  2D73TE75

## (undated) DEVICE — WRAP EZY KNEE 1213PP

## (undated) DEVICE — DRAPE C-ARM 60X42" 1013

## (undated) DEVICE — SOL NACL 0.9% INJ 1000ML BAG 2B1324X

## (undated) DEVICE — ESU PENCIL W/SMOKE EVAC NEPTUNE STRYKER 0703-046-000

## (undated) DEVICE — GLOVE BIOGEL PI MICRO INDICATOR UNDERGLOVE SZ 8.0 48980

## (undated) DEVICE — PACK TOTAL HIP W/U DRAPE SOP15HUFSC

## (undated) DEVICE — GLOVE BIOGEL PI SZ 7.5 40875

## (undated) DEVICE — SU MONOCRYL 3-0 PS-2 27" Y427H

## (undated) DEVICE — BLADE SAW RECIP STRK 70X12.5X0.80MM 0277-096-277

## (undated) DEVICE — DRAPE IOBAN INCISE 23X17" 6650EZ

## (undated) DEVICE — SOL NACL 0.9% IRRIG 1000ML BOTTLE 2F7124

## (undated) DEVICE — PACK TOTAL KNEE SOP15TKFSD

## (undated) DEVICE — MANIFOLD NEPTUNE 4 PORT 700-20

## (undated) DEVICE — SOL BENZOIN 0.5OZ

## (undated) DEVICE — BONE CEMENT MIXEVAC III HI VAC KIT  0206-015-000

## (undated) DEVICE — DRAPE STERI TOWEL LG 1010

## (undated) DEVICE — PREP CHLORAPREP 26ML TINTED ORANGE  260815

## (undated) DEVICE — STPL SKIN 35W ROTATING HEAD PRW35

## (undated) DEVICE — GLOVE PROTEXIS POWDER FREE 7.5 ORTHOPEDIC 2D73ET75

## (undated) DEVICE — GLOVE PROTEXIS W/NEU-THERA 8.0  2D73TE80

## (undated) DEVICE — BLADE SAW SAGITTAL STRK 25X79.5X1.24MM 4/2000 2108-318-000

## (undated) DEVICE — BLADE SAW SAGITTAL STRK MED WIDE 25X73X0.89MM 2108-105-000

## (undated) DEVICE — GOWN IMPERVIOUS SPECIALTY XLG/XLONG 32474

## (undated) DEVICE — SOLUTION WOUND CLEANSING 3/4OZ 10% PVP EA-L3011FB-50

## (undated) DEVICE — SOL NACL 0.9% IRRIG 3000ML BAG 2B7477

## (undated) DEVICE — SU VICRYL 1 CTX 36" J371H

## (undated) DEVICE — BLADE SAW SAGITTAL STRK 18X90X1.27MM HD SYS 6 6118-127-090

## (undated) DEVICE — ESU GROUND PAD UNIVERSAL W/O CORD

## (undated) DEVICE — SU VICRYL 0 CT-1 CR 8X18" J740D

## (undated) DEVICE — STPL SKIN 35W 6.9MM  PXW35

## (undated) DEVICE — SYR 50ML LL W/O NDL 309653

## (undated) DEVICE — SUCTION IRR SYSTEM W/O TIP INTERPULSE HANDPIECE 0210-100-000

## (undated) DEVICE — GLOVE PROTEXIS BLUE W/NEU-THERA 8.0  2D73EB80

## (undated) DEVICE — SUCTION TIP YANKAUER STR K87

## (undated) DEVICE — Device

## (undated) DEVICE — SUCTION MANIFOLD NEPTUNE 2 SYS 4 PORT 0702-020-000

## (undated) DEVICE — DRAPE CONVERTORS U-DRAPE 60X72" 8476

## (undated) RX ORDER — VANCOMYCIN HYDROCHLORIDE 1 G/20ML
INJECTION, POWDER, LYOPHILIZED, FOR SOLUTION INTRAVENOUS
Status: DISPENSED
Start: 2024-04-15

## (undated) RX ORDER — ONDANSETRON 2 MG/ML
INJECTION INTRAMUSCULAR; INTRAVENOUS
Status: DISPENSED
Start: 2025-01-06

## (undated) RX ORDER — FENTANYL CITRATE 50 UG/ML
INJECTION, SOLUTION INTRAMUSCULAR; INTRAVENOUS
Status: DISPENSED
Start: 2019-08-13

## (undated) RX ORDER — TRANEXAMIC ACID 650 MG/1
TABLET ORAL
Status: DISPENSED
Start: 2025-01-06

## (undated) RX ORDER — CEFAZOLIN SODIUM/WATER 2 G/20 ML
SYRINGE (ML) INTRAVENOUS
Status: DISPENSED
Start: 2025-01-06

## (undated) RX ORDER — DEXAMETHASONE SODIUM PHOSPHATE 4 MG/ML
INJECTION, SOLUTION INTRA-ARTICULAR; INTRALESIONAL; INTRAMUSCULAR; INTRAVENOUS; SOFT TISSUE
Status: DISPENSED
Start: 2025-01-06

## (undated) RX ORDER — PROPOFOL 10 MG/ML
INJECTION, EMULSION INTRAVENOUS
Status: DISPENSED
Start: 2024-04-15

## (undated) RX ORDER — EPHEDRINE SULFATE 50 MG/ML
INJECTION, SOLUTION INTRAMUSCULAR; INTRAVENOUS; SUBCUTANEOUS
Status: DISPENSED
Start: 2024-04-15

## (undated) RX ORDER — ATROPINE SULFATE 0.4 MG/ML
AMPUL (ML) INJECTION
Status: DISPENSED
Start: 2024-12-30

## (undated) RX ORDER — METOPROLOL TARTRATE 1 MG/ML
INJECTION, SOLUTION INTRAVENOUS
Status: DISPENSED
Start: 2024-12-30

## (undated) RX ORDER — TRANEXAMIC ACID 650 MG/1
TABLET ORAL
Status: DISPENSED
Start: 2024-04-15

## (undated) RX ORDER — ONDANSETRON 2 MG/ML
INJECTION INTRAMUSCULAR; INTRAVENOUS
Status: DISPENSED
Start: 2019-08-13

## (undated) RX ORDER — LIDOCAINE HYDROCHLORIDE 20 MG/ML
INJECTION, SOLUTION EPIDURAL; INFILTRATION; INTRACAUDAL; PERINEURAL
Status: DISPENSED
Start: 2019-08-13

## (undated) RX ORDER — PROPOFOL 10 MG/ML
INJECTION, EMULSION INTRAVENOUS
Status: DISPENSED
Start: 2025-01-06

## (undated) RX ORDER — CEFAZOLIN SODIUM 2 G/100ML
INJECTION, SOLUTION INTRAVENOUS
Status: DISPENSED
Start: 2019-08-13

## (undated) RX ORDER — GLYCOPYRROLATE 0.2 MG/ML
INJECTION, SOLUTION INTRAMUSCULAR; INTRAVENOUS
Status: DISPENSED
Start: 2019-08-13

## (undated) RX ORDER — FENTANYL CITRATE 50 UG/ML
INJECTION, SOLUTION INTRAMUSCULAR; INTRAVENOUS
Status: DISPENSED
Start: 2025-01-06

## (undated) RX ORDER — ATROPINE SULFATE 0.4 MG/ML
AMPUL (ML) INJECTION
Status: DISPENSED
Start: 2019-08-09

## (undated) RX ORDER — DOBUTAMINE HYDROCHLORIDE 200 MG/100ML
INJECTION INTRAVENOUS
Status: DISPENSED
Start: 2019-08-09

## (undated) RX ORDER — CEFAZOLIN SODIUM 1 G/3ML
INJECTION, POWDER, FOR SOLUTION INTRAMUSCULAR; INTRAVENOUS
Status: DISPENSED
Start: 2019-08-13

## (undated) RX ORDER — DEXAMETHASONE SODIUM PHOSPHATE 4 MG/ML
INJECTION, SOLUTION INTRA-ARTICULAR; INTRALESIONAL; INTRAMUSCULAR; INTRAVENOUS; SOFT TISSUE
Status: DISPENSED
Start: 2019-08-13

## (undated) RX ORDER — DOBUTAMINE HYDROCHLORIDE 200 MG/100ML
INJECTION INTRAVENOUS
Status: DISPENSED
Start: 2024-12-30

## (undated) RX ORDER — PROPOFOL 10 MG/ML
INJECTION, EMULSION INTRAVENOUS
Status: DISPENSED
Start: 2019-08-13

## (undated) RX ORDER — HYDROMORPHONE HYDROCHLORIDE 1 MG/ML
INJECTION, SOLUTION INTRAMUSCULAR; INTRAVENOUS; SUBCUTANEOUS
Status: DISPENSED
Start: 2019-08-13

## (undated) RX ORDER — OXYCODONE HCL 10 MG/1
TABLET, FILM COATED, EXTENDED RELEASE ORAL
Status: DISPENSED
Start: 2019-08-13

## (undated) RX ORDER — NEOSTIGMINE METHYLSULFATE 1 MG/ML
VIAL (ML) INJECTION
Status: DISPENSED
Start: 2019-08-13

## (undated) RX ORDER — METOPROLOL TARTRATE 1 MG/ML
INJECTION, SOLUTION INTRAVENOUS
Status: DISPENSED
Start: 2019-08-09